# Patient Record
Sex: FEMALE | Race: WHITE | Employment: OTHER | URBAN - NONMETROPOLITAN AREA
[De-identification: names, ages, dates, MRNs, and addresses within clinical notes are randomized per-mention and may not be internally consistent; named-entity substitution may affect disease eponyms.]

---

## 2017-07-26 DIAGNOSIS — I10 ESSENTIAL HYPERTENSION: ICD-10-CM

## 2017-07-26 RX ORDER — CARVEDILOL 3.12 MG/1
TABLET ORAL
Qty: 60 TABLET | Refills: 0 | Status: SHIPPED | OUTPATIENT
Start: 2017-07-26 | End: 2018-01-19 | Stop reason: SDUPTHER

## 2018-01-19 DIAGNOSIS — I10 ESSENTIAL HYPERTENSION: ICD-10-CM

## 2018-01-19 RX ORDER — CARVEDILOL 3.12 MG/1
TABLET ORAL
Qty: 60 TABLET | Refills: 0 | Status: SHIPPED | OUTPATIENT
Start: 2018-01-19

## 2018-01-24 ENCOUNTER — APPOINTMENT (OUTPATIENT)
Dept: GENERAL RADIOLOGY | Age: 83
End: 2018-01-24
Payer: MEDICARE

## 2018-01-24 ENCOUNTER — HOSPITAL ENCOUNTER (EMERGENCY)
Age: 83
Discharge: HOME | End: 2018-01-24
Payer: MEDICARE

## 2018-01-24 VITALS
HEIGHT: 60 IN | OXYGEN SATURATION: 96 % | DIASTOLIC BLOOD PRESSURE: 76 MMHG | RESPIRATION RATE: 18 BRPM | WEIGHT: 165 LBS | SYSTOLIC BLOOD PRESSURE: 123 MMHG | BODY MASS INDEX: 32.39 KG/M2 | TEMPERATURE: 98.2 F | HEART RATE: 64 BPM

## 2018-01-24 DIAGNOSIS — S22.42XA CLOSED FRACTURE OF MULTIPLE RIBS OF LEFT SIDE, INITIAL ENCOUNTER: Primary | ICD-10-CM

## 2018-01-24 PROCEDURE — 71101 X-RAY EXAM UNILAT RIBS/CHEST: CPT

## 2018-01-24 PROCEDURE — 99283 EMERGENCY DEPT VISIT LOW MDM: CPT

## 2018-01-24 PROCEDURE — 99283 EMERGENCY DEPT VISIT LOW MDM: CPT | Performed by: NURSE PRACTITIONER

## 2018-01-24 ASSESSMENT — PAIN DESCRIPTION - ORIENTATION: ORIENTATION: LEFT

## 2018-01-24 ASSESSMENT — PAIN SCALES - GENERAL: PAINLEVEL_OUTOF10: 8

## 2018-01-24 ASSESSMENT — PAIN DESCRIPTION - LOCATION: LOCATION: RIB CAGE

## 2018-01-24 ASSESSMENT — PAIN DESCRIPTION - PAIN TYPE: TYPE: ACUTE PAIN

## 2018-01-24 ASSESSMENT — ENCOUNTER SYMPTOMS
SHORTNESS OF BREATH: 0
COUGH: 0

## 2018-01-24 NOTE — ED PROVIDER NOTES
Left Ear: External ear normal.   Nose: Nose normal.   Mouth/Throat: Oropharynx is clear and moist.   Eyes: Conjunctivae and EOM are normal. Pupils are equal, round, and reactive to light. Neck: Normal range of motion. Neck supple. Cardiovascular: Normal rate, regular rhythm, normal heart sounds and intact distal pulses. Pulmonary/Chest: Effort normal and breath sounds normal. No respiratory distress. She has no wheezes. She exhibits no tenderness. Abdominal: Soft. Bowel sounds are normal.   Musculoskeletal: Normal range of motion. Arms:  Neurological: She is alert and oriented to person, place, and time. Skin: Skin is warm and dry. Psychiatric: She has a normal mood and affect. Vitals reviewed. DIAGNOSTIC RESULTS     RADIOLOGY:   Non-plain film images such as CT, Ultrasound and MRI are read by the radiologist. Plain radiographic images are visualized and preliminarily interpreted by No att. providers found with the below findings:      Interpretation per the Radiologist below, if available at the time of this note:    XR RIBS LEFT INCLUDE CHEST (MIN 3 VIEWS)   Final Result   Age-indeterminate nondisplaced left sixth and seventh rib   fractures. Correlate clinically. Signed by Dr Irma Rogers on 1/24/2018 2:13 PM          LABS:  Labs Reviewed - No data to display    All other labs were within normal range or not returned as of this dictation.     RE-ASSESSMENT        EMERGENCY DEPARTMENT COURSE and DIFFERENTIAL DIAGNOSIS/MDM:   Vitals:    Vitals:    01/24/18 1315 01/24/18 1317   BP:  123/76   Pulse:  64   Resp:  18   Temp:  98.2 °F (36.8 °C)   TempSrc:  Oral   SpO2:  96%   Weight: 165 lb (74.8 kg)    Height: 5' (1.524 m)        MDM  Number of Diagnoses or Management Options  Closed fracture of multiple ribs of left side, initial encounter:   Diagnosis management comments: Diagnosis management comments:   44-year-old female patient presents to the emergency room with complaints of left rib (Please note that portions of this note were completed with a voice recognition program.  Efforts were made to edit the dictations but occasionally words are mis-transcribed.)    Adonay Corbett, IZA  01/24/18 9935

## 2018-01-24 NOTE — ED NOTES
Pt to ED with reports of fall on 1/19/2018 onto left side.  Pt c/o left side/rib pain under and behind left breast/rib area     Toni Kilgore RN  01/24/18 0743

## 2019-07-01 ENCOUNTER — HOSPITAL ENCOUNTER (OUTPATIENT)
Dept: GENERAL RADIOLOGY | Age: 84
Discharge: HOME | End: 2019-07-01
Payer: MEDICARE

## 2019-07-01 DIAGNOSIS — M54.5 ACUTE LOW BACK PAIN, UNSPECIFIED BACK PAIN LATERALITY, WITH SCIATICA PRESENCE UNSPECIFIED: ICD-10-CM

## 2019-07-01 PROCEDURE — 72170 X-RAY EXAM OF PELVIS: CPT

## 2019-07-01 PROCEDURE — 72100 X-RAY EXAM L-S SPINE 2/3 VWS: CPT

## 2022-02-08 ENCOUNTER — APPOINTMENT (OUTPATIENT)
Dept: GENERAL RADIOLOGY | Age: 87
End: 2022-02-08
Payer: MEDICARE

## 2022-02-08 ENCOUNTER — APPOINTMENT (OUTPATIENT)
Dept: CT IMAGING | Age: 87
End: 2022-02-08
Payer: MEDICARE

## 2022-02-08 ENCOUNTER — HOSPITAL ENCOUNTER (EMERGENCY)
Age: 87
Discharge: HOME | End: 2022-02-08
Attending: EMERGENCY MEDICINE
Payer: MEDICARE

## 2022-02-08 VITALS
SYSTOLIC BLOOD PRESSURE: 118 MMHG | DIASTOLIC BLOOD PRESSURE: 81 MMHG | OXYGEN SATURATION: 98 % | TEMPERATURE: 98.4 F | RESPIRATION RATE: 14 BRPM | HEART RATE: 78 BPM

## 2022-02-08 DIAGNOSIS — R11.2 NAUSEA AND VOMITING, INTRACTABILITY OF VOMITING NOT SPECIFIED, UNSPECIFIED VOMITING TYPE: Primary | ICD-10-CM

## 2022-02-08 DIAGNOSIS — R93.89 ABNORMAL CT SCAN: ICD-10-CM

## 2022-02-08 DIAGNOSIS — U07.1 COVID-19: ICD-10-CM

## 2022-02-08 LAB
ALBUMIN SERPL-MCNC: 3.3 G/DL (ref 3.5–5.2)
ALP BLD-CCNC: 53 U/L (ref 35–104)
ALT SERPL-CCNC: 12 U/L (ref 5–33)
ANION GAP SERPL CALCULATED.3IONS-SCNC: 12 MMOL/L (ref 7–19)
AST SERPL-CCNC: 20 U/L (ref 5–32)
BACTERIA: NEGATIVE /HPF
BILIRUB SERPL-MCNC: 0.6 MG/DL (ref 0.2–1.2)
BILIRUBIN URINE: NEGATIVE
BLOOD, URINE: ABNORMAL
BUN BLDV-MCNC: 9 MG/DL (ref 8–23)
CALCIUM SERPL-MCNC: 8.5 MG/DL (ref 8.2–9.6)
CHLORIDE BLD-SCNC: 102 MMOL/L (ref 98–111)
CLARITY: CLEAR
CO2: 26 MMOL/L (ref 22–29)
COLOR: YELLOW
CREAT SERPL-MCNC: 0.6 MG/DL (ref 0.5–0.9)
CRYSTALS, UA: ABNORMAL /HPF
EPITHELIAL CELLS, UA: 2 /HPF (ref 0–5)
GFR AFRICAN AMERICAN: >59
GFR NON-AFRICAN AMERICAN: >60
GLUCOSE BLD-MCNC: 93 MG/DL (ref 74–109)
GLUCOSE URINE: NEGATIVE MG/DL
HCT VFR BLD CALC: 42.8 % (ref 37–47)
HEMOGLOBIN: 14.1 G/DL (ref 12–16)
HYALINE CASTS: 0 /HPF (ref 0–8)
KETONES, URINE: 40 MG/DL
LACTIC ACID: 1.5 MMOL/L (ref 0.5–1.9)
LEUKOCYTE ESTERASE, URINE: NEGATIVE
LIPASE: 35 U/L (ref 13–60)
MCH RBC QN AUTO: 32 PG (ref 27–31)
MCHC RBC AUTO-ENTMCNC: 32.9 G/DL (ref 33–37)
MCV RBC AUTO: 97.3 FL (ref 81–99)
NITRITE, URINE: NEGATIVE
PDW BLD-RTO: 13.6 % (ref 11.5–14.5)
PH UA: 7 (ref 5–8)
PLATELET # BLD: 123 K/UL (ref 130–400)
PMV BLD AUTO: 11.1 FL (ref 9.4–12.3)
POTASSIUM REFLEX MAGNESIUM: 3.7 MMOL/L (ref 3.5–5)
PROTEIN UA: NEGATIVE MG/DL
RBC # BLD: 4.4 M/UL (ref 4.2–5.4)
RBC UA: 4 /HPF (ref 0–4)
SARS-COV-2, NAAT: DETECTED
SODIUM BLD-SCNC: 140 MMOL/L (ref 136–145)
SPECIFIC GRAVITY UA: 1.03 (ref 1–1.03)
TOTAL PROTEIN: 5.5 G/DL (ref 6.6–8.7)
TROPONIN: <0.01 NG/ML (ref 0–0.03)
UROBILINOGEN, URINE: 0.2 E.U./DL
WBC # BLD: 4.7 K/UL (ref 4.8–10.8)
WBC UA: 1 /HPF (ref 0–5)

## 2022-02-08 PROCEDURE — 6370000000 HC RX 637 (ALT 250 FOR IP): Performed by: EMERGENCY MEDICINE

## 2022-02-08 PROCEDURE — 81001 URINALYSIS AUTO W/SCOPE: CPT

## 2022-02-08 PROCEDURE — 96375 TX/PRO/DX INJ NEW DRUG ADDON: CPT

## 2022-02-08 PROCEDURE — 83605 ASSAY OF LACTIC ACID: CPT

## 2022-02-08 PROCEDURE — 2580000003 HC RX 258: Performed by: EMERGENCY MEDICINE

## 2022-02-08 PROCEDURE — 6360000004 HC RX CONTRAST MEDICATION: Performed by: EMERGENCY MEDICINE

## 2022-02-08 PROCEDURE — 85027 COMPLETE CBC AUTOMATED: CPT

## 2022-02-08 PROCEDURE — 6360000002 HC RX W HCPCS: Performed by: EMERGENCY MEDICINE

## 2022-02-08 PROCEDURE — 99284 EMERGENCY DEPT VISIT MOD MDM: CPT

## 2022-02-08 PROCEDURE — 2500000003 HC RX 250 WO HCPCS: Performed by: EMERGENCY MEDICINE

## 2022-02-08 PROCEDURE — 96374 THER/PROPH/DIAG INJ IV PUSH: CPT

## 2022-02-08 PROCEDURE — 80053 COMPREHEN METABOLIC PANEL: CPT

## 2022-02-08 PROCEDURE — 83690 ASSAY OF LIPASE: CPT

## 2022-02-08 PROCEDURE — 71045 X-RAY EXAM CHEST 1 VIEW: CPT

## 2022-02-08 PROCEDURE — 74177 CT ABD & PELVIS W/CONTRAST: CPT

## 2022-02-08 PROCEDURE — 36415 COLL VENOUS BLD VENIPUNCTURE: CPT

## 2022-02-08 PROCEDURE — 84484 ASSAY OF TROPONIN QUANT: CPT

## 2022-02-08 PROCEDURE — 93005 ELECTROCARDIOGRAM TRACING: CPT

## 2022-02-08 PROCEDURE — 87635 SARS-COV-2 COVID-19 AMP PRB: CPT

## 2022-02-08 RX ORDER — OMEPRAZOLE 20 MG/1
20 CAPSULE, DELAYED RELEASE ORAL DAILY
Qty: 14 CAPSULE | Refills: 0 | Status: SHIPPED | OUTPATIENT
Start: 2022-02-08 | End: 2022-10-19

## 2022-02-08 RX ORDER — ONDANSETRON 4 MG/1
4 TABLET, ORALLY DISINTEGRATING ORAL EVERY 8 HOURS PRN
Qty: 15 TABLET | Refills: 0 | Status: SHIPPED | OUTPATIENT
Start: 2022-02-08 | End: 2022-10-19

## 2022-02-08 RX ORDER — SODIUM CHLORIDE 9 MG/ML
INJECTION, SOLUTION INTRAVENOUS CONTINUOUS
Status: DISCONTINUED | OUTPATIENT
Start: 2022-02-08 | End: 2022-02-08 | Stop reason: HOSPADM

## 2022-02-08 RX ORDER — ONDANSETRON 2 MG/ML
4 INJECTION INTRAMUSCULAR; INTRAVENOUS ONCE
Status: COMPLETED | OUTPATIENT
Start: 2022-02-08 | End: 2022-02-08

## 2022-02-08 RX ADMIN — ONDANSETRON 4 MG: 2 INJECTION INTRAMUSCULAR; INTRAVENOUS at 15:27

## 2022-02-08 RX ADMIN — FAMOTIDINE 20 MG: 10 INJECTION, SOLUTION INTRAVENOUS at 17:26

## 2022-02-08 RX ADMIN — Medication: at 17:27

## 2022-02-08 RX ADMIN — IOPAMIDOL 90 ML: 755 INJECTION, SOLUTION INTRAVENOUS at 16:03

## 2022-02-08 RX ADMIN — SODIUM CHLORIDE: 9 INJECTION, SOLUTION INTRAVENOUS at 15:21

## 2022-02-08 ASSESSMENT — ENCOUNTER SYMPTOMS
EYE PAIN: 0
VOMITING: 1
SHORTNESS OF BREATH: 0
NAUSEA: 1
DIARRHEA: 0
BLOOD IN STOOL: 0
ABDOMINAL PAIN: 0

## 2022-02-08 NOTE — ED PROVIDER NOTES
[facility]  eMERGENCY dEPARTMENT eNCOUnter      Pt Name: Agusto Lane  MRN: [id number]  [place] 5/29/1930  Date of evaluation: 2/8/2022  Provider: Nerissa Haas MD    [address] [place] [address]       Chief Complaint   Patient presents with    Emesis     x6 days         HISTORY OF PRESENT ILLNESS   (Location/Symptom, Timing/Onset,Context/Setting, Quality, Duration, Modifying Factors, Severity)  Note limiting factors. Agusto Lane is a 80 y.o. female who presents to the emergency department with multiple complaints. Patient very pleasant but slightly vague historian. Said that she has not felt well for about a week. Said that she has had nausea and decreased appetite. Has not really eaten or drank much at all in the past week. Vomited today. No hematemesis. No black or bloody stools. Tells me her PCP called in antibiotics for her yesterday thinking she might have UTI but she is not actually seen her primary doctor. Vomited after taking the antibiotics. Cannot tell me what antibiotic she is on. Said that her local pharmacist also gave her something over-the-counter for nausea but does not know what this was either. No diarrhea. Mild lower abdominal discomfort. No flank pain. No hematuria. No hematemesis or blood in stools. No cough or respiratory symptoms. No fevers. Has felt fatigued. HPI    NursingNotes were reviewed. REVIEW OF SYSTEMS    (2-9 systems for level 4, 10 or more for level 5)     Review of Systems   Constitutional: Positive for appetite change and fatigue. Negative for fever. Eyes: Negative for pain. Respiratory: Negative for shortness of breath. Cardiovascular: Negative for chest pain and palpitations. Gastrointestinal: Positive for nausea and vomiting. Negative for abdominal pain, blood in stool and diarrhea. Genitourinary: Negative for dysuria. Skin: Negative for rash. Neurological: Negative for weakness and headaches.    All other systems reviewed and are negative. A complete review of systems was performed and is negative except as noted above in the HPI. PAST MEDICAL HISTORY     Past Medical History:   Diagnosis Date    CAD (coronary artery disease) 6/12/2013    Cardiomyopathy ([address] 6/12/2013    EF 25%    CHF (congestive heart failure) (HCC)     Hyperlipemia     Dr. Jaclyn Hurst manages    Status post coronary artery stent placement 6/12/2013    LAD         SURGICAL HISTORY       Past Surgical History:   Procedure Laterality Date    APPENDECTOMY      BACK SURGERY      [facility]    EF 20-25%    TONSILLECTOMY           CURRENT MEDICATIONS       Discharge Medication List as of 2/8/2022  5:56 PM      CONTINUE these medications which have NOT CHANGED    Details   carvedilol (COREG) 3.125 MG tablet TAKE 1 TABLET BY MOUTH 2 TIMES DAILY (WITH MEALS), Disp-60 tablet, R-0Normal      vitamin D (ERGOCALCIFEROL) 58038 UNITS CAPS capsule Take 50,000 Units by mouth once a week      lisinopril (PRINIVIL;ZESTRIL) 5 MG tablet Take 1 tablet by mouth daily, Disp-30 tablet, R-3      pravastatin (PRAVACHOL) 40 MG tablet Take 40 mg by mouth daily. HYDROcodone-acetaminophen (NORCO) 5-325 MG per tablet Take 1 tablet by mouth every 6 hours as needed for Pain. furosemide (LASIX) 40 MG tablet Take 40 mg by mouth daily. aspirin 81 MG tablet Take 81 mg by mouth daily. ALLERGIES     Codeine, Cortizone, Penicillins, and Sulfa antibiotics    FAMILY HISTORY       Family History   Problem Relation Age of Onset    Cancer Sister           SOCIAL HISTORY       Social History     Socioeconomic History    Marital status:       Spouse name: None    Number of children: None    Years of education: None    Highest education level: None   Occupational History    None   Tobacco Use    Smoking status: Never Smoker    Smokeless tobacco: Never Used   Substance and Sexual Activity    Alcohol use: No    Drug use: No    Sexual activity: None   Other Topics Concern    None   Social History Narrative    None     Social Determinants of Health     Financial Resource Strain:     Difficulty of Paying Living Expenses: Not on file   Food Insecurity:     Worried About Running Out of Food in the Last Year: Not on file    Marzena of Food in the Last Year: Not on file   Transportation Needs:     Lack of Transportation (Medical): Not on file    Lack of Transportation (Non-Medical): Not on file   Physical Activity:     Days of Exercise per Week: Not on file    Minutes of Exercise per Session: Not on file   Stress:     Feeling of Stress : Not on file   Social Connections:     Frequency of Communication with Friends and Family: Not on file    Frequency of Social Gatherings with Friends and Family: Not on file    Attends Hoahaoism Services: Not on file    Active Member of [address] [place] [address] or Organizations: Not on file    Attends Club or Organization Meetings: Not on file    Marital Status: Not on file   Intimate Partner Violence:     Fear of Current or Ex-Partner: Not on file    Emotionally Abused: Not on file    Physically Abused: Not on file    Sexually Abused: Not on file   Housing Stability:     Unable to Pay for Housing in the Last Year: Not on file    Number of [place] in the Last Year: Not on file    Unstable Housing in the Last Year: Not on file       SCREENINGS             PHYSICAL EXAM    (up to 7 for level 4, 8 or more for level 5)     ED Triage Vitals [02/08/22 1406]   BP Temp Temp Source Pulse Resp SpO2 Height Weight   123/89 98.7 °F (37.1 °C) Oral 72 17 99 % -- --       Physical Exam  Vitals reviewed. Constitutional:       General: She is not in acute distress. Appearance: She is well-developed. She is not toxic-appearing. HENT:      Head: Normocephalic and atraumatic. Mouth/Throat:      Mouth: Mucous membranes are moist.      Pharynx: Oropharynx is clear.  No oropharyngeal exudate or posterior oropharyngeal erythema. Eyes:      General: No scleral icterus. Pupils: Pupils are equal, round, and reactive to light. Neck:      Vascular: No JVD. Cardiovascular:      Rate and Rhythm: Normal rate and regular rhythm. Pulses: Normal pulses. Heart sounds: Normal heart sounds. Pulmonary:      Effort: Pulmonary effort is normal. No respiratory distress. Breath sounds: Normal breath sounds. Abdominal:      General: There is no distension. Palpations: Abdomen is soft. There is no pulsatile mass. Tenderness: There is no abdominal tenderness. There is no guarding or rebound. Musculoskeletal:         General: No tenderness. Cervical back: Normal range of motion and neck supple. Skin:     General: Skin is warm and dry. Capillary Refill: Capillary refill takes less than 2 seconds. Neurological:      General: No focal deficit present. Mental Status: She is alert and oriented to person, place, and time. Psychiatric:         Mood and Affect: Mood normal.         Behavior: Behavior normal.         DIAGNOSTIC RESULTS     EKG: All EKG's are interpreted by the Emergency Department Physician who either signs or Co-signs this chart in the absence of a cardiologist.    Normal sinus rhythm. Normal QT. Borderline ST changes in several leads. T wave changes laterally and in V2. Probable LVH    RADIOLOGY:   Non-plain film images such as CT, Ultrasound and MRI are read by the radiologist. Michael Gums images are visualized and preliminarily interpreted by the emergency physician with the below findings:    Interpretation per the Radiologist below, if available at the time of this note:    CT ABDOMEN PELVIS W IV CONTRAST Additional Contrast? None   Final Result   1. Wall thickening suspected of the distal stomach/gastric antrum   which may be infectious/inflammatory or possibly neoplastic. No   evidence for gastric outlet obstruction with decompressed stomach.  No   free air or loculated abscess. No bowel obstruction. Left colonic   diverticulosis. Absent appendix. 2. Cholelithiasis without biliary dilatation. 3. Cortical renal atrophy with peripelvic renal cysts. No   hydronephrosis. 4. Osteopenia with grade 2 spondylolisthesis at L4/5 and chronic   appearing compression of the L3 and L1 vertebra. 5. Stable hepatic cyst.   Signed by Dr Star Carvajal      XR CHEST PORTABLE   Final Result   1. Medial right basilar densities may be secondary to patchy   atelectasis or pneumonia. Stable cardiomegaly with no radiographic   evidence of edema. .   Signed by Dr Casillas Balloon:  Labs Reviewed   COVID-19, RAPID - Abnormal; Notable for the following components:       Result Value    SARS-CoV-2, NAAT DETECTED (*)     All other components within normal limits    Narrative:     [place] tel. ,  Microbiology results called to and read back by Anna smith, 02/08/2022  15:39, by ROBERT   CBC - Abnormal; Notable for the following components:    WBC 4.7 (*)     MCH 32.0 (*)     MCHC 32.9 (*)     Platelets 538 (*)     All other components within normal limits   COMPREHENSIVE METABOLIC PANEL W/ REFLEX TO MG FOR LOW K - Abnormal; Notable for the following components: Total Protein 5.5 (*)     Albumin 3.3 (*)     All other components within normal limits   URINE RT REFLEX TO CULTURE - Abnormal; Notable for the following components:    Ketones, Urine 40 (*)     Blood, Urine TRACE (*)     All other components within normal limits   MICROSCOPIC URINALYSIS - Abnormal; Notable for the following components:    Bacteria, UA NEGATIVE (*)     Crystals, UA NEG (*)     All other components within normal limits   LIPASE   LACTIC ACID, PLASMA   TROPONIN       All other labs were within normal range or not returned as of this dictation.     EMERGENCY DEPARTMENT COURSE and DIFFERENTIALDIAGNOSIS/MDM:   Vitals:    Vitals:    02/08/22 1406 02/08/22 1700   BP: 123/89 118/81   Pulse: 72 78 Resp: 17 14   Temp: 98.7 °F (37.1 °C) 98.4 °F (36.9 °C)   TempSrc: Oral    SpO2: 99% 98%       MDM  Discussed risk and benefits of Sotrovimab infusion. After discussing this patient declines Sotrovimab. Discussed all results with patient and her niece who is here with her now. No strong indications for admission at this time but given her age and very poor p.o. intake and the fact that she is vomited today offered to speak with the hospitalist about admission. Patient does not want to stay. Prefers to go home. Will see if she tolerates p.o. challenge and reassess. Patient still nauseated but no vomiting here and tolerating oral intake. Offered to admit but she declines admission and wants to go home. Told her to follow-up with her primary doctor and return to the ER for any change or worsening symptoms or new concerns. Will make sure patient can ambulate with a walker prior to discharge which is what she does at baseline. Family is here with her. I told them that they should either stay with her or make sure someone is checking on patient very frequently. Very clear that patient needs to return if she has any change or worsening symptoms or new concerns. Otherwise, told patient she needs to follow-up with her primary doctor and to notify primary doctor about visit here so they can review all of her results from today's visit. Offered to go ahead and put her on antibiotics for possible secondary pneumonia given possible infiltrate seen on chest x-ray although told patient and family that this is probably from [place]. Patient declines antibiotics. Again, told to return to ER for change worsening symptoms or new concerns. I have discussed my findings, my impression, and my differential diagnosis to the patient. The patient understands the risks, benefits, and alternatives of an inpatient versus outpatient continued evaluation and treatment. The patient has capacity to make medical decisions. The patient declines hospital admission or further observation in the emergency department. The patient understands the importance of follow-up and agrees to return if the condition changes, worsens, or if the patient changes his/her mind about inpatient evaluation and care. CONSULTS:  None    PROCEDURES:  Unless otherwise notedbelow, none     Procedures    FINAL IMPRESSION     1. Nausea and vomiting, intractability of vomiting not specified, unspecified vomiting type    2. COVID-19    3.  Abnormal CT scan          DISPOSITION/PLAN   DISPOSITION        PATIENT REFERRED TO:  @FUP@    DISCHARGE MEDICATIONS:  Discharge Medication List as of 2/8/2022  5:56 PM      START taking these medications    Details   ondansetron (ZOFRAN ODT) 4 MG disintegrating tablet Take 1 tablet by mouth every 8 hours as needed for Nausea or Vomiting, Disp-15 tablet, R-0Normal      omeprazole (PRILOSEC) 20 MG delayed release capsule Take 1 capsule by mouth daily, Disp-14 capsule, R-0Normal                (Please note that portions of this note were completed with a voice recognition program.  Efforts were made to edit the dictations butoccasionally words are mis-transcribed.)    Donny Burton MD (electronically signed)  AttendingEmergency Physician          Donny Burton MD  02/17/22 2808

## 2022-02-09 ENCOUNTER — CARE COORDINATION (OUTPATIENT)
Dept: CARE COORDINATION | Age: 87
End: 2022-02-09

## 2022-02-09 NOTE — CARE COORDINATION
Ambulatory Care Coordination ED COVID Follow up Call    Challenges to be reviewed by the provider   Additional needs identified to be addressed with provider: Yes  abnormal CT result from ER evaluation                 Encounter was routed to provider for escalation. Method of communication with provider: phone    Discussed [phone] related testing which was: available at this time. Test results were: positive. Patient informed of results, if available? Yes. Current Symptoms: fatigue, no new symptoms and no worsening symptoms   Pt stated she is very tired after yesterday. She reported she feels less nauseated. She took her morning meds. Pt has neighbor that will do errands for pt and will check on her. Her niece helps as well but lives in [place] - does call pt regularly to check in. Pt stated she is going to eat cereal and juice this morning. Her pharmacy will deliver her prescriptions today. She denied any respiratory symptoms or concerns today. Patient understands ACM will contact her PCP office and she will be contacted regarding f/u of her ER visit. She had no additional questions. Reviewed New or Changed Meds: yes    Do you have what you need at home?  Durable Medical Equipment ordered at discharge: None   Home Health/Outpatient orders at discharge: none   Was patient discharged with a pulse oximeter? No Discussed and confirmed pulse oximeter discharge instructions and when to notify provider or seek emergency care. Patient education provided: Reviewed appropriate site of care based on symptoms and resources available to patient including: PCP and When to call 911. Follow up appointment recommended: yes. If no appointment scheduled, scheduling offered: Yes and ACM called PCP. They will contact pt. ACM faxed ER summary and imaging reports to office. No future appointments.     Interventions: Obtained and reviewed discharge summary and/or continuity of care documents  Education of

## 2022-02-14 ENCOUNTER — CARE COORDINATION (OUTPATIENT)
Dept: CARE COORDINATION | Age: 87
End: 2022-02-14

## 2022-02-14 LAB
EKG P AXIS: 55 DEGREES
EKG P-R INTERVAL: 198 MS
EKG Q-T INTERVAL: 456 MS
EKG QRS DURATION: 112 MS
EKG QTC CALCULATION (BAZETT): 471 MS
EKG T AXIS: 126 DEGREES
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
## 2022-02-16 ENCOUNTER — CARE COORDINATION (OUTPATIENT)
Dept: CARE COORDINATION | Age: 87
End: 2022-02-16

## 2022-02-16 NOTE — CARE COORDINATION
Follow Up Call    Challenges to be reviewed by the provider   Additional needs identified to be addressed with provider: No  none                 Encounter was not routed to provider for escalation. Method of communication with provider:  none. Contacted the patient by telephone to follow up after ED. Status: improved  Interventions to address identified needs: Assessment and support for treatment adherence and medication management-self-care and mgmt of symptoms, medication compliance, completion of appt with provider. [facility] follow up appointment(s): No future appointments. Non-[facility] follow up appointment(s): Dr. Evita Gutierrez on 2/17/22   Follow up appointment completed? tomorrow. Provided contact information for future needs. Plan for follow-up call in 1-2 days based on severity of symptoms and risk factors. Plan for next call: follow up self care and improved health.     Tucker Mays RN

## 2022-02-18 ENCOUNTER — CARE COORDINATION (OUTPATIENT)
Dept: CARE COORDINATION | Age: 87
End: 2022-02-18
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
## 2022-09-17 ENCOUNTER — APPOINTMENT (OUTPATIENT)
Dept: GENERAL RADIOLOGY | Age: 87
End: 2022-09-17
Payer: MEDICARE

## 2022-09-17 ENCOUNTER — HOSPITAL ENCOUNTER (EMERGENCY)
Age: 87
Discharge: HOME | End: 2022-09-17
Payer: MEDICARE

## 2022-09-17 VITALS
TEMPERATURE: 99 F | SYSTOLIC BLOOD PRESSURE: 122 MMHG | DIASTOLIC BLOOD PRESSURE: 72 MMHG | RESPIRATION RATE: 15 BRPM | OXYGEN SATURATION: 94 % | HEART RATE: 73 BPM

## 2022-09-17 DIAGNOSIS — M25.552 LEFT HIP PAIN: Primary | ICD-10-CM

## 2022-09-17 PROCEDURE — 73502 X-RAY EXAM HIP UNI 2-3 VIEWS: CPT

## 2022-09-17 PROCEDURE — 73502 X-RAY EXAM HIP UNI 2-3 VIEWS: CPT | Performed by: RADIOLOGY

## 2022-09-17 PROCEDURE — 99283 EMERGENCY DEPT VISIT LOW MDM: CPT

## 2022-09-17 ASSESSMENT — ENCOUNTER SYMPTOMS
COUGH: 0
VOMITING: 0
NAUSEA: 0
SHORTNESS OF BREATH: 0
DIARRHEA: 0
ABDOMINAL PAIN: 0

## 2022-09-17 ASSESSMENT — PAIN DESCRIPTION - DESCRIPTORS: DESCRIPTORS: ACHING

## 2022-09-17 ASSESSMENT — PAIN DESCRIPTION - LOCATION: LOCATION: LEG

## 2022-09-17 ASSESSMENT — PAIN - FUNCTIONAL ASSESSMENT: PAIN_FUNCTIONAL_ASSESSMENT: 0-10

## 2022-09-17 ASSESSMENT — PAIN SCALES - GENERAL: PAINLEVEL_OUTOF10: 2

## 2022-09-17 ASSESSMENT — PAIN DESCRIPTION - ORIENTATION: ORIENTATION: LEFT

## 2022-09-17 ASSESSMENT — PAIN DESCRIPTION - FREQUENCY: FREQUENCY: CONTINUOUS

## 2022-09-17 ASSESSMENT — PAIN DESCRIPTION - PAIN TYPE: TYPE: ACUTE PAIN

## 2022-09-17 NOTE — ED PROVIDER NOTES
[facility] EMERGENCY DEPT  eMERGENCY dEPARTMENT eNCOUnter      Pt Name: Jolynn Joe  MRN: [id number]  [place] 5/29/1930  Date of evaluation: 9/17/2022  Provider: Ana María Kern, [address] [place] [address]       Chief Complaint   Patient presents with    Fall     Pt arrives via EMS from home. Pt fell last Sunday when she tripped over her bedspread. Pt denies LOC. Pt reports increased left hip pain. No shortening or rotation noted, tender upon palpation. HISTORY OF PRESENT ILLNESS   (Location/Symptom, Timing/Onset,Context/Setting, Quality, Duration, Modifying Factors, Severity)  Note limiting factors. Jolynn Joe is a 80 y.o. female with history including hyperlipidemia, CHF, CAD, AAA, and dilated cardiomyopathy who presents to the emergency department with complaint of a fall. The patient fell last Sunday, 7 days ago. She states she was getting up to go unlock her front door whenever she tripped over her comforter. She denies any associated head injury or loss of consciousness at that time. She states she landed on her left hip. She was able to get up and use her walker. She has pain with ambulation. She does still have normal range of motion of the hip. She denies any associated back pain, numbness, bowel or bladder dysfunction, or saddle anesthesia. HPI    NursingNotes were reviewed. REVIEW OF SYSTEMS    (2-9 systems for level 4, 10 or more for level 5)     Review of Systems   Constitutional:  Negative for chills and fever. HENT:  Negative for congestion. Respiratory:  Negative for cough and shortness of breath. Cardiovascular:  Negative for chest pain. Gastrointestinal:  Negative for abdominal pain, diarrhea, nausea and vomiting. Genitourinary:  Negative for dysuria, flank pain, frequency and hematuria. Musculoskeletal:  Positive for arthralgias. Neurological:  Negative for dizziness and headaches. All other systems reviewed and are negative.          PAST MEDICALHISTORY     Past Medical History:   Diagnosis Date    CAD (coronary artery disease) 6/12/2013    Cardiomyopathy ([facility] [address] 6/12/2013    EF 25%    CHF (congestive heart failure) ([facility] [address]     Hyperlipemia     Dr. Laquita Pryor manages    Status post coronary artery stent placement 6/12/2013    LAD         SURGICAL HISTORY       Past Surgical History:   Procedure Laterality Date    APPENDECTOMY      BACK SURGERY      CARDIAC CATHETERIZATION  4/8/2013   [facility]    EF 20-25%    TONSILLECTOMY           CURRENT MEDICATIONS     Previous Medications    ASPIRIN 81 MG TABLET    Take 81 mg by mouth daily. CARVEDILOL (COREG) 3.125 MG TABLET    TAKE 1 TABLET BY MOUTH 2 TIMES DAILY (WITH MEALS)    FUROSEMIDE (LASIX) 40 MG TABLET    Take 40 mg by mouth daily. HYDROCODONE-ACETAMINOPHEN (NORCO) 5-325 MG PER TABLET    Take 1 tablet by mouth every 6 hours as needed for Pain. LISINOPRIL (PRINIVIL;ZESTRIL) 5 MG TABLET    Take 1 tablet by mouth daily    OMEPRAZOLE (PRILOSEC) 20 MG DELAYED RELEASE CAPSULE    Take 1 capsule by mouth daily    ONDANSETRON (ZOFRAN ODT) 4 MG DISINTEGRATING TABLET    Take 1 tablet by mouth every 8 hours as needed for Nausea or Vomiting    PRAVASTATIN (PRAVACHOL) 40 MG TABLET    Take 40 mg by mouth daily. VITAMIN D (ERGOCALCIFEROL) 07293 UNITS CAPS CAPSULE    Take 50,000 Units by mouth once a week       ALLERGIES     Codeine, Cortizone, Penicillins, and Sulfa antibiotics    FAMILY HISTORY       Family History   Problem Relation Age of Onset    Cancer Sister           SOCIAL HISTORY       Social History     Socioeconomic History    Marital status:     Tobacco Use    Smoking status: Never    Smokeless tobacco: Never   Substance and Sexual Activity    Alcohol use: No    Drug use: No       SCREENINGS    [place] Coma Scale  Eye Opening: Spontaneous  Best Verbal Response: Oriented  Best Motor Response: Obeys commands  [place] Coma Scale Score: 15        PHYSICAL EXAM    (up to 7 for level 4, 8 or more for level 5)     ED Triage Vitals [09/17/22 1029]   BP Temp Temp src Heart Rate Resp SpO2 Height Weight   126/68 99 °F (37.2 °C) -- 75 16 92 % -- --       Physical Exam  Vitals and nursing note reviewed. Constitutional:       General: She is not in acute distress. Appearance: Normal appearance. She is normal weight. She is not ill-appearing, toxic-appearing or diaphoretic. HENT:      Head: Normocephalic and atraumatic. Eyes:      Extraocular Movements: Extraocular movements intact. Pupils: Pupils are equal, round, and reactive to light. Cardiovascular:      Rate and Rhythm: Normal rate and regular rhythm. Pulses: Normal pulses. Pulmonary:      Effort: Pulmonary effort is normal. No respiratory distress. Chest:      Chest wall: No tenderness. Abdominal:      General: There is no distension. Palpations: Abdomen is soft. Tenderness: There is no abdominal tenderness. Musculoskeletal:      Cervical back: Normal range of motion and neck supple. No swelling, rigidity, tenderness or bony tenderness. No pain with movement. Normal range of motion. Thoracic back: No swelling, deformity, tenderness or bony tenderness. Normal range of motion. Lumbar back: No swelling, deformity, tenderness or bony tenderness. Normal range of motion. Left hip: Tenderness and bony tenderness present. No deformity. Normal range of motion. Normal strength. Left upper leg: No swelling, edema, deformity, tenderness or bony tenderness. Left knee: No swelling, deformity or bony tenderness. Normal range of motion. No tenderness. Normal alignment. Normal pulse. Left lower leg: No swelling, deformity, tenderness or bony tenderness. Comments: Bilateral upper and lower extremities are negative for tenderness, swelling, decreased range of motion, or deformity except for the left hip. Neurovascularly intact extremities. Tenderness noted of the right hip without associated decrease in range of motion.   Joint above and below within normal limits. Skin:     General: Skin is warm and dry. Neurological:      General: No focal deficit present. Mental Status: She is alert and oriented to person, place, and time. DIAGNOSTIC RESULTS     RADIOLOGY:  Non-plain film images such as CT, Ultrasound and MRI are read by the radiologist. Plain radiographic images are visualized and preliminarily interpreted bythe emergency physician with the below findings:    XR HIP 2-3 VW W PELVIS LEFT   Final Result   Severe demineralization with diffuse vascular calcifications. Study otherwise normal   Recommendation: Follow up as clinically indicated. Electronically Signed by Norman Kang MD at 17-Sep-2022 12:57:19 PM                 LABS:  Labs Reviewed - No data to display    All other labs were within normal range or not returned as of this dictation. EMERGENCY DEPARTMENT COURSE and DIFFERENTIAL DIAGNOSIS/MDM:   Vitals:    Vitals:    09/17/22 1029   BP: 126/68   Pulse: 75   Resp: 16   Temp: 99 °F (37.2 °C)   SpO2: 92%       MDM  Patient is a 80-year-old female presents ER with complaint of left hip pain after a fall 1 week ago. Her vital signs are stable. She does not have any associated tenderness or other complaints of pain warranting further imaging. Plain films of the left hip and pelvis were negative for acute bony fracture or malalignment. She is able to ambulate but does require walker due to her pain. I spoke with her caretaker, Bing otoole. She notes that the patient has a wheelchair and a walker at home. Ms. otoole is actually the one who called EMS today. She states she called due to Ms. Monique Rasmussen being confused. She states that the patient was not acting at her baseline. She denies any associated significant unilateral weakness or slurred speech. I discussed the complaint of concern for confusion with the patient and her niece.   Patient denies any associated confusion this morning and states she was at her baseline. She states she was mixed up on who was supposed to be coming to the house this morning but otherwise had no confusion. Her niece notes she is at her baseline currently. She is alert and oriented in the ER. Due to her being alert and oriented, she is in the correct state of mind to decline any further testing. Patient has declined any further work-up regarding her confusion and only wanted to be seen for the left hip. She will be discharged with her niece's care. She and her niece did verbalize understanding of risk associated with declining work-up as well as return precautions. All their questions were answered. They are agreeable to treatment plan. I did encourage follow-up outpatient with orthopedics if she is not improving. She is already on loratab at home so she does not warrant further pain meds at home. FINAL IMPRESSION      1. Left hip pain          DISPOSITION/PLAN   DISPOSITION Decision To Discharge 09/17/2022 12:30:18 PM      PATIENT REFERRED TO:  Hayes Odonnell  [address] [place] [address].  [address] [place] [address]    In 3 days      Valente Pool MD  [address] [place] [address]  [phone]      As needed, If symptoms worsen      DISCHARGE MEDICATIONS:  New Prescriptions    No medications on file          (Please note that portions of this note were completed with a voice recognition program.  Efforts were made to edit thedictations but occasionally words are mis-transcribed.)    Lucian Weinberg (electronically signed)        Lucian Weinberg  09/17/22 2015

## 2022-10-19 ENCOUNTER — HOSPITAL ENCOUNTER (OUTPATIENT)
Dept: WOUND CARE | Age: 87
Discharge: HOME | End: 2022-10-19
Payer: MEDICARE

## 2022-10-19 VITALS
WEIGHT: 130 LBS | HEIGHT: 60 IN | TEMPERATURE: 97.2 F | BODY MASS INDEX: 25.52 KG/M2 | HEART RATE: 76 BPM | RESPIRATION RATE: 20 BRPM | DIASTOLIC BLOOD PRESSURE: 54 MMHG | SYSTOLIC BLOOD PRESSURE: 126 MMHG

## 2022-10-19 DIAGNOSIS — L97.422: Primary | ICD-10-CM

## 2022-10-19 DIAGNOSIS — L89.610 UNSTAGEABLE PRESSURE ULCER OF RIGHT HEEL (HCC): ICD-10-CM

## 2022-10-19 DIAGNOSIS — L89.620 UNSTAGEABLE PRESSURE ULCER OF LEFT HEEL (HCC): ICD-10-CM

## 2022-10-19 PROCEDURE — 99214 OFFICE O/P EST MOD 30 MIN: CPT

## 2022-10-19 PROCEDURE — 99215 OFFICE O/P EST HI 40 MIN: CPT | Performed by: NURSE PRACTITIONER

## 2022-10-19 NOTE — DISCHARGE INSTRUCTIONS
[address] [facility]   Physician Orders and Discharge Instructions  [address]  Telephone: [phone]    NAME:  Carlos Alberto Shrestha  YOB: 1930  MEDICAL RECORD NUMBER:  [id number]  DATE:  10/19/2022    Discharge condition: Stable    Discharge to: Home    Left via:Private automobile    Accompanied by:  friend    ECF/HHA: [facility]    Please go downstairs in this building to Room 103 to register. The procedure will be completed in Room 401. Please be aware of appointment time for this procedure. Please arrive to room 103 at 2:00pm on October 24 for procedure. Please do not smoke prior to test.    Dressing Orders:  Keep heels dry    Treatment Orders:  Avoid Pressure to wound site. Off-load heels by applying heel-lift boots or \"float\" heels by placing pillows under calves so that heels do not touch mattress. Can be ordered online- [employer] or [employer]    Continue Protein rich diet (unless restricted by your physician)  Take Multivitamin daily    Please use Roho cushion in chair  Please use low air loss bed      [facility] follow up visit _____1 week with Shanon________________________  (Please note your next appointment above and if you are unable to keep, kindly give a 24 hour notice. Thank you.)          If you experience any of the following, please call the [email or web address] during business hours:    * Increase in Pain  * Temperature over 101  * Increase in drainage from your wound  * Drainage with a foul odor  * Bleeding  * Increase in swelling  * Need for compression bandage changes due to slippage, breakthrough drainage. If you need medical attention outside of the business hours of the [address] please contact your PCP or go to the nearest emergency room.

## 2022-10-20 PROBLEM — L89.620 UNSTAGEABLE PRESSURE ULCER OF LEFT HEEL (HCC): Status: ACTIVE | Noted: 2022-10-20

## 2022-10-20 PROBLEM — L89.610 UNSTAGEABLE PRESSURE ULCER OF RIGHT HEEL (HCC): Status: ACTIVE | Noted: 2022-10-20

## 2022-10-20 ASSESSMENT — VISUAL ACUITY: OU: 1

## 2022-10-20 NOTE — PLAN OF CARE
Problem: Chronic Conditions and Co-morbidities  Goal: Patient's chronic conditions and co-morbidity symptoms are monitored and maintained or improved  Outcome: Progressing     Problem: Discharge Planning  Goal: Discharge to home or other facility with appropriate resources  Outcome: Progressing     Problem: Wound:  Goal: Will show signs of wound healing; wound closure and no evidence of infection  Description: Will show signs of wound healing; wound closure and no evidence of infection  Outcome: Progressing     Problem: Pressure Ulcer:  Goal: Signs of wound healing will improve  Description: Signs of wound healing will improve  Outcome: Progressing  Goal: Absence of new pressure ulcer  Description: Absence of new pressure ulcer  Outcome: Progressing  Goal: Will show no infection signs and symptoms  Description: Will show no infection signs and symptoms  Outcome: Progressing

## 2022-10-20 NOTE — PROGRESS NOTES
Patient Care Team:  Sigifredo Chang as PCP - General  CShorty Fatima MD (Cardiology)    TODAY'S DATE:  10/19/2022     HISTORY of PRESENTILLNESS HPI   Mariano Green is a 80 y.o. female who presents today for wound evaluation. She reports she developed a wound on right/left foot. This started 2 week(s) ago. She believes this is not healing. She has been applying nothing. She has not had  fever or chills. She has a history of bedridden due to a recent fall. She states she is worsening as she is walking. Wound Type:pressure  Wound Location: left heel and right heel   Modifying factors:none    Patient Active Problem List   Diagnosis Code    Ischemic dilated cardiomyopathy ([facility] [address] I25.5, I42.0    Status post coronary artery stent placement Z95.5    Mixed hyperlipidemia E78.2    CAP (community acquired pneumonia) J18.9    Cholelithiasis K80.20    Diverticulosis K57.90    Hyperlipemia E78.5    Chronic systolic congestive heart failure (HCC) I50.22    Acute respiratory failure with hypoxia (HCC) J96.01    AAA (abdominal aortic aneurysm) I71.40    Descending thoracic aortic aneurysm I71.23    Pneumonia of right lower lobe due to infectious organism J18.9    Calculus of gallbladder without cholecystitis without obstruction K80.20    Coronary artery disease involving native heart without angina pectoris I25.10    Diverticulosis of intestine without bleeding K57.90    Hyperlipidemia E78.5    Unstageable pressure ulcer of left heel (HCC) L89.620    Unstageable pressure ulcer of right heel ([facility] [address] L89.610       Mariano Green is a 80 y.o. female with the following history reviewed and recorded in [facility]:    Current Outpatient Medications   Medication Sig Dispense Refill    carvedilol (COREG) 3.125 MG tablet TAKE 1 TABLET BY MOUTH 2 TIMES DAILY (WITH MEALS) 60 tablet 0    lisinopril (PRINIVIL;ZESTRIL) 5 MG tablet Take 1 tablet by mouth daily 30 tablet 3    pravastatin (PRAVACHOL) 40 MG tablet Take 40 mg by mouth daily. HYDROcodone-acetaminophen (NORCO) 5-325 MG per tablet Take 1 tablet by mouth every 6 hours as needed for Pain. furosemide (LASIX) 40 MG tablet Take 40 mg by mouth daily. (Patient not taking: Reported on 10/19/2022)      aspirin 81 MG tablet Take 81 mg by mouth daily. No current facility-administered medications for this encounter. Allergies: Codeine, Cortizone, Penicillins, and Sulfa antibiotics  Past Medical History:   Diagnosis Date    CAD (coronary artery disease) 6/12/2013    Cardiomyopathy ([address] 6/12/2013    EF 25%    CHF (congestive heart failure) (HCC)     Hyperlipemia     Dr. Yohana Dang manages    Status post coronary artery stent placement 6/12/2013    LAD       Past Surgical History:   Procedure Laterality Date    APPENDECTOMY      BACK SURGERY      CARDIAC CATHETERIZATION  4/8/2013   [facility]    EF 20-25%    TONSILLECTOMY       Family History   Problem Relation Age of Onset    Cancer Sister      Social History     Tobacco Use    Smoking status: Never    Smokeless tobacco: Never   Substance Use Topics    Alcohol use: No         Review of Systems    Review of Systems   Skin:  Positive for wound. All other systems reviewed and are negative. All other review of systems are negative. Physical Exam    BP (!) 126/54   Pulse 76   Temp 97.2 °F (36.2 °C) (Temporal)   Resp 20   Ht 5' (1.524 m)   Wt 130 lb (59 kg)   BMI 25.39 kg/m²     Physical Exam  Vitals reviewed. Constitutional:       Appearance: Normal appearance. She is normal weight. HENT:      Head: Normocephalic and atraumatic. Right Ear: External ear normal.      Left Ear: External ear normal.   Eyes:      General: Lids are normal. Lids are everted, no foreign bodies appreciated. Vision grossly intact. Gaze aligned appropriately. Cardiovascular:      Rate and Rhythm: Normal rate and regular rhythm. Pulses:           Dorsalis pedis pulses are detected w/ Doppler on the right side and detected w/ Doppler on the left side. Posterior tibial pulses are detected w/ Doppler on the right side and detected w/ Doppler on the left side. Heart sounds: Normal heart sounds. Pulmonary:      Effort: Pulmonary effort is normal.      Breath sounds: Normal breath sounds. Abdominal:      General: Bowel sounds are normal.   Musculoskeletal:         General: Normal range of motion. Feet:    Skin:     General: Skin is warm and dry. Capillary Refill: Capillary refill takes 2 to 3 seconds. Neurological:      Mental Status: She is alert and oriented to person, place, and time. Psychiatric:         Mood and Affect: Mood normal.         Behavior: Behavior normal.         Thought Content: Thought content normal.         Judgment: Judgment normal.           Post Debridement Measurements and Assessment:    The patientspain is   . Please refer to nursing measurements and assessment regarding wound pre and postdebridement. Wound 10/19/22 Heel Right wound 2- right heel unstageable (Active)   Wound Image   10/19/22 1417   Wound Etiology Pressure Unstageable 10/19/22 1417   Dressing Status Clean;Dry; Intact 10/19/22 1417   Wound Cleansed Not Cleansed 10/19/22 1417   Dressing/Treatment Dry dressing 10/20/22 0749   Wound Length (cm) 2.5 cm 10/19/22 1417   Wound Width (cm) 2.5 cm 10/19/22 1417   Wound Depth (cm) 0.1 cm 10/19/22 1417   Wound Surface Area (cm^2) 6.25 cm^2 10/19/22 1417   Wound Volume (cm^3) 0.625 cm^3 10/19/22 1417   Wound Assessment Eschar dry 10/19/22 1417   Drainage Amount None 10/19/22 1417   Odor None 10/19/22 1417   Nikki-wound Assessment Intact 10/19/22 1417   Margins Flat/open edges 10/19/22 1417   Wound Thickness Description not for Pressure Injury Full thickness 10/19/22 1417   Number of days: 0       Wound 10/19/22 Heel Left wound 1- left heel unstagable (Active)   Wound Image   10/19/22 1417   Wound Etiology Pressure Unstageable 10/19/22 1417   Dressing Status Clean;Dry; Intact 10/19/22 1417   Wound Cleansed Not Cleansed 10/19/22 1417   Dressing/Treatment Dry dressing 10/20/22 0749   Wound Length (cm) 4 cm 10/19/22 1417   Wound Width (cm) 5 cm 10/19/22 1417   Wound Depth (cm) 0.1 cm 10/19/22 1417   Wound Surface Area (cm^2) 20 cm^2 10/19/22 1417   Wound Volume (cm^3) 2 cm^3 10/19/22 1417   Wound Assessment Eschar dry 10/19/22 1417   Drainage Amount None 10/19/22 1417   Odor None 10/19/22 1417   Nikki-wound Assessment Intact 10/19/22 1417   Margins Flat/open edges 10/19/22 1417   Wound Thickness Description not for Pressure Injury Full thickness 10/19/22 1417   Number of days: 0          Assessment    1. Ischemic ulcer of left heel, with fat layer exposed ([address]    2. Unstageable pressure ulcer of left heel ([address]    3. Unstageable pressure ulcer of right heel ([address]          Plan    JENNIFER    Plan for wound - Dress per physician order  Treatment:     Compression : No   Offloading : Yes   Dressing : keep area dry    Discussed importance of keeping the area dry, offloading, nutrition, and plan of care. Patient understanding and questions answered. I spent a total of  60 minutes face to face with the patient. Over 100% of that time was spent on counseling and care coordination. Patient was told that if symptoms worsen or new symptoms develop they are to go to the emergency department immediately. Patient was educated on diagnosis and treatment plan. All of patient's questions were answered, and the patient understands the discharge plan. Discussed appropriate home care of this wound. Wound redressed. Patient instructions were given. Recommend no smoking  Offloading instructions given.       [address] [facility]   Physician Orders and Discharge Instructions  [address]  Telephone: [phone]    NAME:  Stephanie Farfan  YOB: 1930  MEDICAL RECORD NUMBER:  [id number]  DATE: 10/19/2022    Discharge condition: Stable    Discharge to: Home    Left via:Private automobile    Accompanied by:  friend    [facility]HHA: [facility]    Please go downstairs in this building to Room 103 to register. The procedure will be completed in Room 401. Please be aware of appointment time for this procedure. Please arrive to room 103 at 2:00pm on October 24 for procedure. Please do not smoke prior to test.    Dressing Orders:  Keep heels dry    Treatment Orders:  Avoid Pressure to wound site. Off-load heels by applying heel-lift boots or \"float\" heels by placing pillows under calves so that heels do not touch mattress. Can be ordered online- [employer] or [employer]    Continue Protein rich diet (unless restricted by your physician)  Take Multivitamin daily    Please use Roho cushion in chair  Please use low air loss bed      [address] [place] [address],[address] follow up visit _____1 week with Shanon________________________  (Please note your next appointment above and if you are unable to keep, kindly give a 24 hour notice. Thank you.)          If you experience any of the following, please call the [facility] [address] during business hours:    * Increase in Pain  * Temperature over 101  * Increase in drainage from your wound  * Drainage with a foul odor  * Bleeding  * Increase in swelling  * Need for compression bandage changes due to slippage, breakthrough drainage. If you need medical attention outside of the business hours of the [address] please contact your PCP or go to the nearest emergency room.

## 2022-10-24 ENCOUNTER — HOSPITAL ENCOUNTER (OUTPATIENT)
Dept: WOUND CARE | Age: 87
Discharge: HOME | End: 2022-10-24
Payer: MEDICARE

## 2022-10-24 ENCOUNTER — HOSPITAL ENCOUNTER (OUTPATIENT)
Dept: NON INVASIVE DIAGNOSTICS | Age: 87
Discharge: HOME | End: 2022-10-24
Payer: MEDICARE

## 2022-10-24 VITALS
WEIGHT: 130 LBS | TEMPERATURE: 96.9 F | HEART RATE: 76 BPM | BODY MASS INDEX: 25.52 KG/M2 | SYSTOLIC BLOOD PRESSURE: 126 MMHG | DIASTOLIC BLOOD PRESSURE: 54 MMHG | RESPIRATION RATE: 20 BRPM | HEIGHT: 60 IN

## 2022-10-24 DIAGNOSIS — L89.610 UNSTAGEABLE PRESSURE ULCER OF RIGHT HEEL (HCC): ICD-10-CM

## 2022-10-24 DIAGNOSIS — L97.422: ICD-10-CM

## 2022-10-24 DIAGNOSIS — I73.9 PAD (PERIPHERAL ARTERY DISEASE) (HCC): ICD-10-CM

## 2022-10-24 DIAGNOSIS — L89.620 UNSTAGEABLE PRESSURE ULCER OF LEFT HEEL (HCC): Primary | ICD-10-CM

## 2022-10-24 PROCEDURE — 93923 UPR/LXTR ART STDY 3+ LVLS: CPT

## 2022-10-24 PROCEDURE — 99213 OFFICE O/P EST LOW 20 MIN: CPT

## 2022-10-24 PROCEDURE — 99212 OFFICE O/P EST SF 10 MIN: CPT | Performed by: NURSE PRACTITIONER

## 2022-10-24 NOTE — PROGRESS NOTES
[address]   Progress Note and Procedure Note      Michael Bowie RECORD NUMBER:  [id number]  AGE: 80 y.o. GENDER: female  : 1930  EPISODE DATE:  10/24/2022    Subjective:     Chief Complaint   Patient presents with    Wound Check     Bilateral heels         HISTORY of PRESENT ILLNESS JULIAN Shrestha is a 80 y.o. female who presents today for wound/ulcer evaluation. History of Wound Context: bilateral heel wound follow up/eval and treat    Ulcer Identification:  Ulcer Type: pressure  Contributing Factors: chronic pressure, shear force, and arterial insufficiency    Wound: N/A        PAST MEDICAL HISTORY        Diagnosis Date    CAD (coronary artery disease) 2013    Cardiomyopathy ([address] 2013    EF 25%    CHF (congestive heart failure) ([facility])     Hyperlipemia     Dr. Alejandro Soto manages    Status post coronary artery stent placement 2013    LAD       PAST SURGICAL HISTORY    Past Surgical History:   Procedure Laterality Date    APPENDECTOMY      BACK SURGERY      CARDIAC CATHETERIZATION  2013   [facility]    EF 20-25%    TONSILLECTOMY         FAMILY HISTORY    Family History   Problem Relation Age of Onset    Cancer Sister        SOCIAL HISTORY    Social History     Tobacco Use    Smoking status: Never    Smokeless tobacco: Never   Substance Use Topics    Alcohol use: No    Drug use: No       ALLERGIES    Allergies   Allergen Reactions    Codeine      Sick to her stomach    Cortizone      Sick to her stomach    Penicillins      Sick to her stomach    Sulfa Antibiotics      Sick to her stomach       MEDICATIONS    Current Outpatient Medications on File Prior to Encounter   Medication Sig Dispense Refill    carvedilol (COREG) 3.125 MG tablet TAKE 1 TABLET BY MOUTH 2 TIMES DAILY (WITH MEALS) 60 tablet 0    lisinopril (PRINIVIL;ZESTRIL) 5 MG tablet Take 1 tablet by mouth daily 30 tablet 3    pravastatin (PRAVACHOL) 40 MG tablet Take 40 mg by mouth daily. HYDROcodone-acetaminophen (NORCO) 5-325 MG per tablet Take 1 tablet by mouth every 6 hours as needed for Pain. furosemide (LASIX) 40 MG tablet Take 40 mg by mouth daily. (Patient not taking: Reported on 10/19/2022)      aspirin 81 MG tablet Take 81 mg by mouth daily. No current facility-administered medications on file prior to encounter. REVIEW OF SYSTEMS    Pertinent items are noted in HPI.     Objective:      BP (!) 126/54   Pulse 76   Temp 96.9 °F (36.1 °C) (Temporal)   Resp 20   Ht 5' (1.524 m)   Wt 130 lb (59 kg)   BMI 25.39 kg/m²     Wt Readings from Last 3 Encounters:   10/24/22 130 lb (59 kg)   10/19/22 130 lb (59 kg)   01/24/18 165 lb (74.8 kg)       PHYSICAL EXAM    General Appearance: alert and oriented to person, place and time, well developed and well- nourished, in no acute distress  Skin: warm and dry, no rash or erythema  Head: normocephalic and atraumatic  Eyes: pupils equal, round, and reactive to light, extraocular eye movements intact, conjunctivae normal  ENT: tympanic membrane, external ear and ear canal normal bilaterally, nose without deformity, nasal mucosa and turbinates normal without polyps  Neck: supple and non-tender without mass, no thyromegaly or thyroid nodules, no cervical lymphadenopathy  Pulmonary/Chest: clear to auscultation bilaterally- no wheezes, rales or rhonchi, normal air movement, no respiratory distress  Extremities: no cyanosis, clubbing or edema  Musculoskeletal: normal range of motion, no joint swelling, deformity or tenderness  Neurologic: reflexes normal and symmetric, no cranial nerve deficit, gait, coordination and speech normal      Assessment:      Patient Active Problem List   Diagnosis Code    Ischemic dilated cardiomyopathy (HCC) I25.5, I42.0    Status post coronary artery stent placement Z95.5    Mixed hyperlipidemia E78.2    CAP (community acquired pneumonia) J18.9    Cholelithiasis K80.20    Diverticulosis K57.90    Hyperlipemia E78.5 Depth (cm) 0.1 cm 10/24/22 1531   Wound Surface Area (cm^2) 6.25 cm^2 10/24/22 1531   Change in Wound Size % (l*w) 68.75 10/24/22 1531   Wound Volume (cm^3) 0.625 cm^3 10/24/22 1531   Wound Healing % 69 10/24/22 1531   Wound Assessment Eschar dry 10/24/22 1531   Drainage Amount None 10/24/22 1531   Odor None 10/24/22 1531   Nikki-wound Assessment Intact 10/24/22 1531   Margins Flat/open edges 10/24/22 1531   Wound Thickness Description not for Pressure Injury Full thickness 10/24/22 1531   Number of days: 5       Plan:     Problem List Items Addressed This Visit          Circulatory    PAD (peripheral artery disease) (HCC)       Other    * (Principal) Unstageable pressure ulcer of left heel (HCC) - Primary    Unstageable pressure ulcer of right heel ([address]           Treatment Note please see attached Discharge Instructions    In my professional opinion this patient would benefit from HBO Therapy: No    Written patient dismissal instructions given to patient and signed by patient or POA. Ms. Salvatore Pacheco is actually improving with keeping the area drier. She was unable to wear felt/foam.  Her JENNIFER shows tibial disease but she is improving at this point so hold off on vascular consult. Follow up in 2 weeks. Discharge [address] and Hyper[facility]ic Oxygen Therapy   Physician Orders and Discharge Instructions  [address]  Telephone: [phone]    NAME:  Kevin Drafts:  5/29/1930  MEDICAL RECORD NUMBER:  [id number]  DATE:  10/24/2022    Discharge condition: Stable    Discharge to: Home    Left via:Private automobile    Accompanied by:  friend    ECF/HHA: none    Dressing Orders:   Keep heels dry     Treatment Orders:   Avoid Pressure to wound site. Off-load heels by applying heel-lift boots or \"float\" heels by placing pillows under calves so that heels do not touch mattress.    Can be ordered online- [employer] or [employer]     Continue Protein rich diet (unless restricted by your physician)   Take Multivitamin daily     Please use Roho cushion in chair   Please use low air loss bed    [address] [address] follow up visit _____2 weeks with Shanon________________________  (Please note your next appointment above and if you are unable to keep, kindly give a 24 hour notice. Thank you.)          If you experience any of the following, please call the [facility] during business hours:    * Increase in Pain  * Temperature over 101  * Increase in drainage from your wound  * Drainage with a foul odor  * Bleeding  * Increase in swelling  * Need for compression bandage changes due to slippage, breakthrough drainage. If you need medical attention outside of the business hours of the [facility] please contact your PCP or go to the nearest emergency room.         Electronically signed by IZA Justin CNP on 10/24/2022 at 4:31 PM

## 2022-10-24 NOTE — DISCHARGE INSTRUCTIONS
[address] and [facility] Therapy   Physician Orders and Discharge Instructions  [address]  Telephone: [phone]    NAME:  Darby Fisher  YOB: 1930  MEDICAL RECORD NUMBER:  [id number]  DATE:  10/24/2022    Discharge condition: Stable    Discharge to: Home    Left via:Private automobile    Accompanied by:  friend    ECF/HHA: none    Dressing Orders:   Keep heels dry     Treatment Orders:   Avoid Pressure to wound site. Off-load heels by applying heel-lift boots or \"float\" heels by placing pillows under calves so that heels do not touch mattress. Can be ordered online- [employer] or [employer]     Continue Protein rich diet (unless restricted by your physician)   Take Multivitamin daily     Please use Roho cushion in chair   Please use low air loss bed    [address] [facility] follow up visit _____2 weeks with Shanon________________________  (Please note your next appointment above and if you are unable to keep, kindly give a 24 hour notice. Thank you.)          If you experience any of the following, please call the [facility] during business hours:    * Increase in Pain  * Temperature over 101  * Increase in drainage from your wound  * Drainage with a foul odor  * Bleeding  * Increase in swelling  * Need for compression bandage changes due to slippage, breakthrough drainage. If you need medical attention outside of the business hours of the [facility] [address] please contact your PCP or go to the nearest emergency room.

## 2022-11-07 NOTE — DISCHARGE INSTRUCTIONS
[address] and [facility] Therapy   Physician Orders and Discharge Instructions  [address]  Telephone: [phone]    NAME:  Adelia Diaz  YOB: 1930  MEDICAL RECORD NUMBER:  [id number]  DATE:  11/7/2022    Discharge condition: Stable    Discharge to: Home    Left via:Private automobile    Accompanied by:  friend    ECF/HHA: none     Dressing Orders:   Keep heels dry     Treatment Orders:   Avoid Pressure to wound site. Off-load heels by applying heel-lift boots or \"float\" heels by placing pillows under calves so that heels do not touch mattress. Continue Protein rich diet (unless restricted by your physician)   Take Multivitamin daily     Please use Roho cushion in chair   Please use low air loss bed      [address] [place] [address],[address] follow up visit ___4 weeks with Shanon__________________________  (Please note your next appointment above and if you are unable to keep, kindly give a 24 hour notice. Thank you.)          If you experience any of the following, please call the [facility] [address] during business hours:    * Increase in Pain  * Temperature over 101  * Increase in drainage from your wound  * Drainage with a foul odor  * Bleeding  * Increase in swelling  * Need for compression bandage changes due to slippage, breakthrough drainage. If you need medical attention outside of the business hours of the [address] please contact your PCP or go to the nearest emergency room.

## 2022-11-08 ENCOUNTER — HOSPITAL ENCOUNTER (OUTPATIENT)
Dept: WOUND CARE | Age: 87
Discharge: HOME | End: 2022-11-08
Payer: MEDICARE

## 2022-11-08 VITALS
WEIGHT: 130 LBS | BODY MASS INDEX: 25.52 KG/M2 | DIASTOLIC BLOOD PRESSURE: 54 MMHG | HEART RATE: 80 BPM | HEIGHT: 60 IN | TEMPERATURE: 98.8 F | SYSTOLIC BLOOD PRESSURE: 126 MMHG | RESPIRATION RATE: 20 BRPM

## 2022-11-08 DIAGNOSIS — L89.620 UNSTAGEABLE PRESSURE ULCER OF LEFT HEEL (HCC): Primary | ICD-10-CM

## 2022-11-08 DIAGNOSIS — I73.9 PAD (PERIPHERAL ARTERY DISEASE) (HCC): ICD-10-CM

## 2022-11-08 DIAGNOSIS — L89.610 UNSTAGEABLE PRESSURE ULCER OF RIGHT HEEL (HCC): ICD-10-CM

## 2022-11-08 PROCEDURE — 99212 OFFICE O/P EST SF 10 MIN: CPT | Performed by: NURSE PRACTITIONER

## 2022-11-08 PROCEDURE — 99213 OFFICE O/P EST LOW 20 MIN: CPT

## 2022-11-08 RX ORDER — LIDOCAINE 40 MG/G
CREAM TOPICAL ONCE
OUTPATIENT
Start: 2022-11-08 | End: 2022-11-08

## 2022-11-08 RX ORDER — LIDOCAINE HYDROCHLORIDE 20 MG/ML
JELLY TOPICAL ONCE
OUTPATIENT
Start: 2022-11-08 | End: 2022-11-08

## 2022-11-08 RX ORDER — LIDOCAINE HYDROCHLORIDE 40 MG/ML
SOLUTION TOPICAL ONCE
OUTPATIENT
Start: 2022-11-08 | End: 2022-11-08

## 2022-11-08 RX ORDER — LIDOCAINE 50 MG/G
OINTMENT TOPICAL ONCE
OUTPATIENT
Start: 2022-11-08 | End: 2022-11-08

## 2022-11-08 ASSESSMENT — PAIN DESCRIPTION - ORIENTATION: ORIENTATION: RIGHT;LEFT

## 2022-11-08 ASSESSMENT — PAIN SCALES - GENERAL: PAINLEVEL_OUTOF10: 5

## 2022-11-08 ASSESSMENT — PAIN DESCRIPTION - LOCATION: LOCATION: FOOT

## 2022-11-08 NOTE — PROGRESS NOTES
[address]   Progress Note and Procedure Note      Michael Bowie RECORD NUMBER:  [id number]  AGE: 80 y.o. GENDER: female  : 1930  EPISODE DATE:  2022    Subjective:     Chief Complaint   Patient presents with    Wound Check     Bilateral heel wounds         HISTORY of PRESENT ILLNESS HPI     Timoteo Woody is a 80 y.o. female who presents today for wound/ulcer evaluation. History of Wound Context: bilateral heel wound follow up/eval and treat    Ulcer Identification:  Ulcer Type: pressure  Contributing Factors: arterial insufficiency    Wound: N/A        PAST MEDICAL HISTORY        Diagnosis Date    CAD (coronary artery disease) 2013    Cardiomyopathy ([address] 2013    EF 25%    CHF (congestive heart failure) ([facility])     Hyperlipemia     Dr. Kristel Isaacs manages    Status post coronary artery stent placement 2013    LAD       PAST SURGICAL HISTORY    Past Surgical History:   Procedure Laterality Date    APPENDECTOMY      BACK SURGERY      CARDIAC CATHETERIZATION  2013   [facility]    EF 20-25%    TONSILLECTOMY         FAMILY HISTORY    Family History   Problem Relation Age of Onset    Cancer Sister        SOCIAL HISTORY    Social History     Tobacco Use    Smoking status: Never    Smokeless tobacco: Never   Substance Use Topics    Alcohol use: No    Drug use: No       ALLERGIES    Allergies   Allergen Reactions    Codeine      Sick to her stomach    Cortizone      Sick to her stomach    Penicillins      Sick to her stomach    Sulfa Antibiotics      Sick to her stomach       MEDICATIONS    Current Outpatient Medications on File Prior to Encounter   Medication Sig Dispense Refill    carvedilol (COREG) 3.125 MG tablet TAKE 1 TABLET BY MOUTH 2 TIMES DAILY (WITH MEALS) 60 tablet 0    lisinopril (PRINIVIL;ZESTRIL) 5 MG tablet Take 1 tablet by mouth daily 30 tablet 3    pravastatin (PRAVACHOL) 40 MG tablet Take 40 mg by mouth daily.       HYDROcodone-acetaminophen ([facility]) 5-325 MG per tablet Take 1 tablet by mouth every 6 hours as needed for Pain. furosemide (LASIX) 40 MG tablet Take 40 mg by mouth daily. (Patient not taking: No sig reported)      aspirin 81 MG tablet Take 81 mg by mouth daily. No current facility-administered medications on file prior to encounter. REVIEW OF SYSTEMS    Pertinent items are noted in HPI.     Objective:      BP (!) 126/54   Pulse 80   Temp 98.8 °F (37.1 °C) (Temporal)   Resp 20   Ht 5' (1.524 m)   Wt 130 lb (59 kg)   BMI 25.39 kg/m²     Wt Readings from Last 3 Encounters:   11/08/22 130 lb (59 kg)   10/24/22 130 lb (59 kg)   10/19/22 130 lb (59 kg)       PHYSICAL EXAM    General Appearance: alert and oriented to person, place and time, well developed and well- nourished, in no acute distress  Skin: warm and dry, no rash or erythema  Head: normocephalic and atraumatic  Eyes: pupils equal, round, and reactive to light, extraocular eye movements intact, conjunctivae normal  ENT: tympanic membrane, external ear and ear canal normal bilaterally, nose without deformity, nasal mucosa and turbinates normal without polyps  Neck: supple and non-tender without mass, no thyromegaly or thyroid nodules, no cervical lymphadenopathy  Pulmonary/Chest: clear to auscultation bilaterally- no wheezes, rales or rhonchi, normal air movement, no respiratory distress  Extremities: no cyanosis, clubbing or edema  Musculoskeletal: normal range of motion, no joint swelling, deformity or tenderness  Neurologic: reflexes normal and symmetric, no cranial nerve deficit, gait, coordination and speech normal      Assessment:      Patient Active Problem List   Diagnosis Code    Ischemic dilated cardiomyopathy (HCC) I25.5, I42.0    Status post coronary artery stent placement Z95.5    Mixed hyperlipidemia E78.2    CAP (community acquired pneumonia) J18.9    Cholelithiasis K80.20    Diverticulosis K57.90    Hyperlipemia E78.5    Chronic systolic congestive heart failure [address] I50.22    Acute respiratory failure with hypoxia ([facility]) J96.01    AAA (abdominal aortic aneurysm) I71.40    Descending thoracic aortic aneurysm I71.23    Pneumonia of right lower lobe due to infectious organism J18.9    Calculus of gallbladder without cholecystitis without obstruction K80.20    Coronary artery disease involving native heart without angina pectoris I25.10    Diverticulosis of intestine without bleeding K57.90    Hyperlipidemia E78.5    Unstageable pressure ulcer of left heel ([facility]) L89.620    Unstageable pressure ulcer of right heel ([facility]) L89.610    PAD (peripheral artery disease) ([facility]) I73.9                 Wound 10/19/22 Heel Right wound 2- right heel unstageable (Active)   Wound Image   11/08/22 1408   Wound Etiology Pressure Unstageable 11/08/22 1408   Dressing Status Clean;Dry; Intact 11/08/22 1408   Wound Cleansed Soap and water 11/08/22 1408   Dressing/Treatment Open to air 11/08/22 1438   Wound Length (cm) 0.2 cm 11/08/22 1408   Wound Width (cm) 0.3 cm 11/08/22 1408   Wound Depth (cm) 0.1 cm 11/08/22 1408   Wound Surface Area (cm^2) 0.06 cm^2 11/08/22 1408   Change in Wound Size % (l*w) 99.04 11/08/22 1408   Wound Volume (cm^3) 0.006 cm^3 11/08/22 1408   Wound Healing % 99 11/08/22 1408   Wound Assessment Eschar dry 11/08/22 1408   Drainage Amount None 11/08/22 1408   Odor None 11/08/22 1408   Nikki-wound Assessment Intact 11/08/22 1408   Margins Flat/open edges 11/08/22 1408   Wound Thickness Description not for Pressure Injury Full thickness 11/08/22 1408   Number of days: 20       Wound 10/19/22 Heel Left wound 1- left heel unstagable (Active)   Wound Image   11/08/22 1408   Wound Etiology Pressure Unstageable 11/08/22 1408   Dressing Status Clean;Dry; Intact 11/08/22 1408   Wound Cleansed Soap and water 11/08/22 1408   Dressing/Treatment Open to air 11/08/22 1438   Wound Length (cm) 3 cm 11/08/22 1408   Wound Width (cm) 2.5 cm 11/08/22 1408   Wound Depth (cm) 0.1 cm 11/08/22 1408 Wound Surface Area (cm^2) 7.5 cm^2 11/08/22 1408   Change in Wound Size % (l*w) 62.5 11/08/22 1408   Wound Volume (cm^3) 0.75 cm^3 11/08/22 1408   Wound Healing % 63 11/08/22 1408   Wound Assessment Eschar dry 11/08/22 1408   Drainage Amount None 11/08/22 1408   Odor None 11/08/22 1408   Nikki-wound Assessment Intact 11/08/22 1408   Margins Flat/open edges 11/08/22 1408   Wound Thickness Description not for Pressure Injury Full thickness 11/08/22 1408   Number of days: 20       Plan:     Problem List Items Addressed This Visit          Circulatory    PAD (peripheral artery disease) (HCC)       Other    * (Principal) Unstageable pressure ulcer of left heel (HCC) - Primary    Unstageable pressure ulcer of right heel ([address]           Treatment Note please see attached Discharge Instructions    In my professional opinion this patient would benefit from HBO Therapy: No    Written patient dismissal instructions given to patient and signed by patient or POA. Ms. Florinda Menendez is healing well, she does report pain but due to her PAD and wound I would expect pain to be present. She will follow up in 4 weeks. Discharge [address] and Hyper[facility] Oxygen Therapy   Physician Orders and Discharge Instructions  [address]  Telephone: [phone]    NAME:  Calin Osborn:  5/29/1930  MEDICAL RECORD NUMBER:  [id number]  DATE:  11/7/2022    Discharge condition: Stable    Discharge to: Home    Left via:Private automobile    Accompanied by:  friend    ECF/HHA: none     Dressing Orders:   Keep heels dry     Treatment Orders:   Avoid Pressure to wound site. Off-load heels by applying heel-lift boots or \"float\" heels by placing pillows under calves so that heels do not touch mattress.      Continue Protein rich diet (unless restricted by your physician)   Take Multivitamin daily     Please use Roho cushion in chair   Please use low air loss bed      [facility] follow up visit ___4 weeks with Shanon__________________________  (Please note your next appointment above and if you are unable to keep, kindly give a 24 hour notice. Thank you.)          If you experience any of the following, please call the [facility] [address] during business hours:    * Increase in Pain  * Temperature over 101  * Increase in drainage from your wound  * Drainage with a foul odor  * Bleeding  * Increase in swelling  * Need for compression bandage changes due to slippage, breakthrough drainage. If you need medical attention outside of the business hours of the [address] please contact your PCP or go to the nearest emergency room.           Electronically signed by IZA Mart CNP on 11/8/2022 at 2:53 PM

## 2022-11-08 NOTE — PLAN OF CARE
Problem: Chronic Conditions and Co-morbidities  Goal: Patient's chronic conditions and co-morbidity symptoms are monitored and maintained or improved  Outcome: Progressing     Problem: Discharge Planning  Goal: Discharge to home or other facility with appropriate resources  Outcome: Progressing     Problem: Pain  Goal: Verbalizes/displays adequate comfort level or baseline comfort level  Outcome: Progressing     Problem: Wound:  Goal: Will show signs of wound healing; wound closure and no evidence of infection  Description: Will show signs of wound healing; wound closure and no evidence of infection  Outcome: Progressing     Problem: Arterial:  Goal: Optimize blood flow for wound healing  Description: Optimize blood flow for wound healing  Outcome: Progressing     Problem: Pressure Ulcer:  Goal: Signs of wound healing will improve  Description: Signs of wound healing will improve  Outcome: Progressing  Goal: Absence of new pressure ulcer  Description: Absence of new pressure ulcer  Outcome: Progressing  Goal: Will show no infection signs and symptoms  Description: Will show no infection signs and symptoms  Outcome: Progressing

## 2022-12-13 ENCOUNTER — HOSPITAL ENCOUNTER (OUTPATIENT)
Dept: WOUND CARE | Age: 87
Discharge: HOME | End: 2022-12-13

## 2022-12-13 VITALS
WEIGHT: 130 LBS | RESPIRATION RATE: 20 BRPM | BODY MASS INDEX: 25.52 KG/M2 | SYSTOLIC BLOOD PRESSURE: 126 MMHG | TEMPERATURE: 98 F | DIASTOLIC BLOOD PRESSURE: 54 MMHG | HEART RATE: 80 BPM | HEIGHT: 60 IN

## 2022-12-13 DIAGNOSIS — L89.610 UNSTAGEABLE PRESSURE ULCER OF RIGHT HEEL (HCC): ICD-10-CM

## 2022-12-13 DIAGNOSIS — I73.9 PAD (PERIPHERAL ARTERY DISEASE) (HCC): Primary | ICD-10-CM

## 2022-12-13 DIAGNOSIS — L89.620 UNSTAGEABLE PRESSURE ULCER OF LEFT HEEL (HCC): ICD-10-CM

## 2022-12-13 RX ORDER — LIDOCAINE 50 MG/G
OINTMENT TOPICAL ONCE
OUTPATIENT
Start: 2022-12-13 | End: 2022-12-13

## 2022-12-13 RX ORDER — LIDOCAINE 40 MG/G
CREAM TOPICAL ONCE
OUTPATIENT
Start: 2022-12-13 | End: 2022-12-13

## 2022-12-13 RX ORDER — LIDOCAINE HYDROCHLORIDE 20 MG/ML
JELLY TOPICAL ONCE
OUTPATIENT
Start: 2022-12-13 | End: 2022-12-13

## 2022-12-13 RX ORDER — LIDOCAINE HYDROCHLORIDE 40 MG/ML
SOLUTION TOPICAL ONCE
OUTPATIENT
Start: 2022-12-13 | End: 2022-12-13

## 2022-12-13 NOTE — PROGRESS NOTES
[address]   Progress Note and Procedure Note      Michael Azeb RECORD NUMBER:  [id number]  AGE: 80 y.o. GENDER: female  : 1930  EPISODE DATE:  2022    Subjective:     Chief Complaint   Patient presents with    Wound Check     Heel wounds      HISTORY of PRESENT ILLNESS HPI     Karo Gomes is a 80 y.o. female who presents today for wound/ulcer evaluation. History of Wound Context: bilateral heel wound follow up/eval and treat    Ulcer Identification:  Ulcer Type: pressure  Contributing Factors: chronic pressure and shear force    Wound: N/A        PAST MEDICAL HISTORY        Diagnosis Date    CAD (coronary artery disease) 2013    Cardiomyopathy ([address] 2013    EF 25%    CHF (congestive heart failure) ([facility])     Hyperlipemia     Dr. Mark Syed manages    Status post coronary artery stent placement 2013    LAD       PAST SURGICAL HISTORY    Past Surgical History:   Procedure Laterality Date    APPENDECTOMY      BACK SURGERY      CARDIAC CATHETERIZATION  2013   [facility]    EF 20-25%    TONSILLECTOMY         FAMILY HISTORY    Family History   Problem Relation Age of Onset    Cancer Sister        SOCIAL HISTORY    Social History     Tobacco Use    Smoking status: Never    Smokeless tobacco: Never   Substance Use Topics    Alcohol use: No    Drug use: No       ALLERGIES    Allergies   Allergen Reactions    Codeine      Sick to her stomach    Cortizone      Sick to her stomach    Penicillins      Sick to her stomach    Sulfa Antibiotics      Sick to her stomach       MEDICATIONS    Current Outpatient Medications on File Prior to Encounter   Medication Sig Dispense Refill    carvedilol (COREG) 3.125 MG tablet TAKE 1 TABLET BY MOUTH 2 TIMES DAILY (WITH MEALS) 60 tablet 0    lisinopril (PRINIVIL;ZESTRIL) 5 MG tablet Take 1 tablet by mouth daily 30 tablet 3    pravastatin (PRAVACHOL) 40 MG tablet Take 40 mg by mouth daily.       HYDROcodone-acetaminophen (NORCO) 5-325 MG per tablet Take 1 tablet by mouth every 6 hours as needed for Pain. furosemide (LASIX) 40 MG tablet Take 40 mg by mouth daily      aspirin 81 MG tablet Take 81 mg by mouth daily. No current facility-administered medications on file prior to encounter. REVIEW OF SYSTEMS    Pertinent items are noted in HPI.     Objective:      BP (!) 126/54   Pulse 80   Temp 98 °F (36.7 °C) (Temporal)   Resp 20   Ht 5' (1.524 m)   Wt 130 lb (59 kg)   BMI 25.39 kg/m²     Wt Readings from Last 3 Encounters:   12/13/22 130 lb (59 kg)   11/08/22 130 lb (59 kg)   10/24/22 130 lb (59 kg)       PHYSICAL EXAM    General Appearance: alert and oriented to person, place and time, well developed and well- nourished, in no acute distress  Skin: warm and dry, no rash or erythema  Head: normocephalic and atraumatic  Eyes: pupils equal, round, and reactive to light, extraocular eye movements intact, conjunctivae normal  ENT: tympanic membrane, external ear and ear canal normal bilaterally, nose without deformity, nasal mucosa and turbinates normal without polyps  Neck: supple and non-tender without mass, no thyromegaly or thyroid nodules, no cervical lymphadenopathy  Pulmonary/Chest: clear to auscultation bilaterally- no wheezes, rales or rhonchi, normal air movement, no respiratory distress  Musculoskeletal: normal range of motion, no joint swelling, deformity or tenderness  Neurologic: reflexes normal and symmetric, no cranial nerve deficit, gait, coordination and speech normal      Assessment:      Patient Active Problem List   Diagnosis Code    Ischemic dilated cardiomyopathy (HCC) I25.5, I42.0    Status post coronary artery stent placement Z95.5    Mixed hyperlipidemia E78.2    CAP (community acquired pneumonia) J18.9    Cholelithiasis K80.20    Diverticulosis K57.90    Hyperlipemia E78.5    Chronic systolic congestive heart failure (HCC) I50.22    Acute respiratory failure with hypoxia (HCC) J96.01    AAA (abdominal aortic aneurysm) I71.40    Descending thoracic aortic aneurysm I71.23    Pneumonia of right lower lobe due to infectious organism J18.9    Calculus of gallbladder without cholecystitis without obstruction K80.20    Coronary artery disease involving native heart without angina pectoris I25.10    Diverticulosis of intestine without bleeding K57.90    Hyperlipidemia E78.5    Unstageable pressure ulcer of left heel (HCC) L89.620    Unstageable pressure ulcer of right heel (HCC) L89.610    PAD (peripheral artery disease) ([facility]) I73.9       Wound 10/19/22 Heel Right wound 2- right heel unstageable (Active)   Wound Image   12/13/22 1320   Wound Etiology Pressure Unstageable 12/13/22 1320   Dressing Status Clean;Dry; Intact 12/13/22 1320   Wound Cleansed Soap and water 12/13/22 1320   Dressing/Treatment Open to air 12/13/22 1352   Wound Length (cm) 0 cm 12/13/22 1320   Wound Width (cm) 0 cm 12/13/22 1320   Wound Depth (cm) 0 cm 12/13/22 1320   Wound Surface Area (cm^2) 0 cm^2 12/13/22 1320   Change in Wound Size % (l*w) 100 12/13/22 1320   Wound Volume (cm^3) 0 cm^3 12/13/22 1320   Wound Healing % 100 12/13/22 1320   Wound Assessment Eschar dry 12/13/22 1320   Drainage Amount None 12/13/22 1320   Odor None 12/13/22 1320   Nikki-wound Assessment Intact 12/13/22 1320   Margins Flat/open edges 12/13/22 1320   Wound Thickness Description not for Pressure Injury Full thickness 11/08/22 1408   Number of days: 55       Wound 10/19/22 Heel Left wound 1- left heel unstagable (Active)   Wound Image   12/13/22 1320   Wound Etiology Pressure Unstageable 12/13/22 1320   Dressing Status Clean;Dry; Intact 12/13/22 1320   Wound Cleansed Soap and water 12/13/22 1320   Dressing/Treatment Open to air 12/13/22 1352   Wound Length (cm) 2.5 cm 12/13/22 1320   Wound Width (cm) 2.2 cm 12/13/22 1320   Wound Depth (cm) 0.1 cm 12/13/22 1320   Wound Surface Area (cm^2) 5.5 cm^2 12/13/22 1320   Change in Wound Size % (l*w) 72.5 12/13/22 1320 Wound Volume (cm^3) 0.55 cm^3 12/13/22 1320   Wound Healing % 73 12/13/22 1320   Post-Procedure Length (cm) 2.5 cm 12/13/22 1352   Post-Procedure Width (cm) 2.2 cm 12/13/22 1352   Post-Procedure Depth (cm) 0.1 cm 12/13/22 1352   Post-Procedure Surface Area (cm^2) 5.5 cm^2 12/13/22 1352   Post-Procedure Volume (cm^3) 0.55 cm^3 12/13/22 1352   Wound Assessment Eschar dry 12/13/22 1320   Drainage Amount None 12/13/22 1320   Odor None 12/13/22 1320   Nikki-wound Assessment Intact 12/13/22 1320   Margins Flat/open edges 12/13/22 1320   Wound Thickness Description not for Pressure Injury Full thickness 12/13/22 1320   Number of days: 55           Procedure Note  Indications:  Based on my examination of this patient's wound(s)/ulcer(s) today, debridement is required to promote healing and evaluate the wound base. Performed by: IZA Aceves CNP    Consent obtained:  Yes    Time out taken:  Yes    Pain Control:         Debridement:Non-excisional Debridement    Using #15 blade scalpel the wound(s)/ulcer(s) was/were sharply debrided down through and including the removal of epidermis and dermis. Devitalized Tissue Debrided:  fibrin, biofilm, slough, necrotic/eschar, and exudate    Pre Debridement Measurements:  Are located in the Wound/Ulcer Documentation Flow Sheet    Wound/Ulcer #: 1    Post Debridement Measurements:  Wound/Ulcer Descriptions are Pre Debridement except measurements:      Percent of Wound/Ulcer Debrided: 100%    Total Surface Area Debrided:  5.5 sq cm     Estimated Blood Loss:  None    Hemostasis Achieved:  not needed    Procedural Pain:  5  / 10     Post Procedural Pain:  0 / 10     Response to treatment:  Well tolerated by patient.          Diabetic/Pressure/Non Pressure Ulcers only:  Ulcer: Pressure ulcer, unstageable            Plan:     Problem List Items Addressed This Visit          Circulatory    PAD (peripheral artery disease) ([facility] - Primary       Other    * (Principal) Unstageable pressure ulcer of left heel (HCC)    Unstageable pressure ulcer of right heel ([address]       Treatment Note please see attached Discharge Instructions    In my professional opinion this patient would benefit from HBO Therapy: No    Written patient dismissal instructions given to patient and signed by patient or POA. Ms. Joaquín Shah is healing well. She is swelling today so I gave her tubi- and instructed her on wearing them daily. I do not think this should put too much pressure on the wound. Follow up in 1 month.   Electronically signed by IZA White CNP on 12/13/2022 at 1:54 PM

## 2022-12-13 NOTE — DISCHARGE INSTRUCTIONS
[address] [facility]   Physician Orders and Discharge Instructions  [address]  Telephone: [phone]    NAME:  Myrtle Ortega  YOB: 1930  MEDICAL RECORD NUMBER:  [id number]  DATE:  12/13/2022    Discharge condition: Stable    Discharge to: Home    Left via:Private automobile    Accompanied by:  friend    ECF/HHA: none     Dressing Orders:   Keep heels dry     Treatment Orders:   Avoid Pressure to wound site. Off-load heels by applying heel-lift boots or \"float\" heels by placing pillows under calves so that heels do not touch mattress. Continue Protein rich diet (unless restricted by your physician)   Take Multivitamin daily     Please use Roho cushion in chair   Please use low air loss bed    [address] [place] [address],[address] follow up visit _______4 weeks with Shanon______________________  (Please note your next appointment above and if you are unable to keep, kindly give a 24 hour notice. Thank you.)          If you experience any of the following, please call the [facility] during business hours:    * Increase in Pain  * Temperature over 101  * Increase in drainage from your wound  * Drainage with a foul odor  * Bleeding  * Increase in swelling  * Need for compression bandage changes due to slippage, breakthrough drainage. If you need medical attention outside of the business hours of the [facility] please contact your PCP or go to the nearest emergency room.

## 2022-12-14 NOTE — PLAN OF CARE
Problem: Chronic Conditions and Co-morbidities  Goal: Patient's chronic conditions and co-morbidity symptoms are monitored and maintained or improved  Outcome: Progressing     Problem: Discharge Planning  Goal: Discharge to home or other facility with appropriate resources  Outcome: Progressing     Problem: Wound:  Goal: Will show signs of wound healing; wound closure and no evidence of infection  Description: Will show signs of wound healing; wound closure and no evidence of infection  Outcome: Progressing     Problem: Pressure Ulcer:  Goal: Signs of wound healing will improve  Description: Signs of wound healing will improve  Outcome: Progressing  Goal: Absence of new pressure ulcer  Description: Absence of new pressure ulcer  Outcome: Progressing  Goal: Will show no infection signs and symptoms  Description: Will show no infection signs and symptoms  Outcome: Progressing     Problem: Arterial:  Goal: Optimize blood flow for wound healing  Description: Optimize blood flow for wound healing  Outcome: Progressing     Problem: Falls - Risk of:  Goal: Will remain free from falls  Description: Will remain free from falls  Outcome: Progressing

## 2022-12-31 ENCOUNTER — HOSPITAL ENCOUNTER (INPATIENT)
Age: 87
LOS: 6 days | Discharge: HOME | End: 2023-01-06
Attending: EMERGENCY MEDICINE
Payer: MEDICARE

## 2022-12-31 ENCOUNTER — APPOINTMENT (OUTPATIENT)
Dept: GENERAL RADIOLOGY | Age: 87
End: 2022-12-31
Payer: MEDICARE

## 2022-12-31 DIAGNOSIS — I50.9 CONGESTIVE HEART FAILURE, UNSPECIFIED HF CHRONICITY, UNSPECIFIED HEART FAILURE TYPE (HCC): Primary | ICD-10-CM

## 2022-12-31 PROBLEM — Z91.199 MEDICALLY NONCOMPLIANT: Status: ACTIVE | Noted: 2022-12-31

## 2022-12-31 PROBLEM — I50.43 SYSTOLIC AND DIASTOLIC CHF, ACUTE ON CHRONIC (HCC): Status: ACTIVE | Noted: 2022-12-31

## 2022-12-31 LAB
ALBUMIN SERPL-MCNC: 3.8 G/DL (ref 3.5–5.2)
ALP BLD-CCNC: 75 U/L (ref 35–104)
ALT SERPL-CCNC: <5 U/L (ref 5–33)
ANION GAP SERPL CALCULATED.3IONS-SCNC: 11 MMOL/L (ref 7–19)
APTT: 26.8 SEC (ref 26–36.2)
AST SERPL-CCNC: 14 U/L (ref 5–32)
BASE EXCESS ARTERIAL: 2.1 MMOL/L (ref -2–2)
BASOPHILS ABSOLUTE: 0.1 K/UL (ref 0–0.2)
BASOPHILS RELATIVE PERCENT: 0.5 % (ref 0–1)
BILIRUB SERPL-MCNC: 1.1 MG/DL (ref 0.2–1.2)
BUN BLDV-MCNC: 14 MG/DL (ref 8–23)
CALCIUM SERPL-MCNC: 9.2 MG/DL (ref 8.2–9.6)
CARBOXYHEMOGLOBIN ARTERIAL: 3 % (ref 0–5)
CHLORIDE BLD-SCNC: 100 MMOL/L (ref 98–111)
CHOLESTEROL, TOTAL: 121 MG/DL (ref 160–199)
CO2: 26 MMOL/L (ref 22–29)
CREAT SERPL-MCNC: 0.8 MG/DL (ref 0.5–0.9)
EOSINOPHILS ABSOLUTE: 0.4 K/UL (ref 0–0.6)
EOSINOPHILS RELATIVE PERCENT: 3.6 % (ref 0–5)
GFR SERPL CREATININE-BSD FRML MDRD: >60 ML/MIN/{1.73_M2}
GLUCOSE BLD-MCNC: 119 MG/DL (ref 74–109)
HBA1C MFR BLD: 4.8 % (ref 4–6)
HCO3 ARTERIAL: 30 MMOL/L (ref 22–26)
HCT VFR BLD CALC: 39.7 % (ref 37–47)
HDLC SERPL-MCNC: 60 MG/DL (ref 65–121)
HEMOGLOBIN, ART, EXTENDED: 12.9 G/DL (ref 12–16)
HEMOGLOBIN: 12.4 G/DL (ref 12–16)
IMMATURE GRANULOCYTES #: 0 K/UL
INR BLD: 1.12 (ref 0.88–1.18)
LACTIC ACID: 2 MMOL/L (ref 0.5–1.9)
LDL CHOLESTEROL CALCULATED: 41 MG/DL
LYMPHOCYTES ABSOLUTE: 1.4 K/UL (ref 1.1–4.5)
LYMPHOCYTES RELATIVE PERCENT: 12.6 % (ref 20–40)
MAGNESIUM: 2 MG/DL (ref 1.7–2.3)
MCH RBC QN AUTO: 30.9 PG (ref 27–31)
MCHC RBC AUTO-ENTMCNC: 31.2 G/DL (ref 33–37)
MCV RBC AUTO: 99 FL (ref 81–99)
METHEMOGLOBIN ARTERIAL: 1.1 %
MODE: ABNORMAL
MONOCYTES ABSOLUTE: 0.5 K/UL (ref 0–0.9)
MONOCYTES RELATIVE PERCENT: 4.7 % (ref 0–10)
NEUTROPHILS ABSOLUTE: 8.7 K/UL (ref 1.5–7.5)
NEUTROPHILS RELATIVE PERCENT: 78.2 % (ref 50–65)
O2 CONTENT ARTERIAL: 17.5 ML/DL
O2 SAT, ARTERIAL: 95.4 %
O2 THERAPY: ABNORMAL
OXYGEN FLOW: 3
PCO2 ARTERIAL: 61 MMHG (ref 35–45)
PDW BLD-RTO: 14.6 % (ref 11.5–14.5)
PH ARTERIAL: 7.3 (ref 7.35–7.45)
PHOSPHORUS: 3.7 MG/DL (ref 2.5–4.5)
PLATELET # BLD: 145 K/UL (ref 130–400)
PMV BLD AUTO: 10.2 FL (ref 9.4–12.3)
PO2 ARTERIAL: 112 MMHG (ref 80–100)
POTASSIUM SERPL-SCNC: 4.1 MMOL/L (ref 3.5–5)
POTASSIUM, WHOLE BLOOD: 3.7
PRO-BNP: 6110 PG/ML (ref 0–1800)
PROTHROMBIN TIME: 14.4 SEC (ref 12–14.6)
RBC # BLD: 4.01 M/UL (ref 4.2–5.4)
SAMPLE SOURCE: ABNORMAL
SARS-COV-2, NAAT: NOT DETECTED
SODIUM BLD-SCNC: 137 MMOL/L (ref 136–145)
SPONT RATE(BPM): 19
TOTAL PROTEIN: 6.1 G/DL (ref 6.6–8.7)
TRIGL SERPL-MCNC: 99 MG/DL (ref 0–149)
TROPONIN: <0.01 NG/ML (ref 0–0.03)
TROPONIN: <0.01 NG/ML (ref 0–0.03)
TSH REFLEX FT4: 1.81 UIU/ML (ref 0.35–5.5)
WBC # BLD: 11.1 K/UL (ref 4.8–10.8)

## 2022-12-31 PROCEDURE — 6360000002 HC RX W HCPCS: Performed by: HOSPITALIST

## 2022-12-31 PROCEDURE — 83036 HEMOGLOBIN GLYCOSYLATED A1C: CPT

## 2022-12-31 PROCEDURE — 82803 BLOOD GASES ANY COMBINATION: CPT

## 2022-12-31 PROCEDURE — 85730 THROMBOPLASTIN TIME PARTIAL: CPT

## 2022-12-31 PROCEDURE — 87040 BLOOD CULTURE FOR BACTERIA: CPT

## 2022-12-31 PROCEDURE — 99285 EMERGENCY DEPT VISIT HI MDM: CPT

## 2022-12-31 PROCEDURE — 36600 WITHDRAWAL OF ARTERIAL BLOOD: CPT

## 2022-12-31 PROCEDURE — 36415 COLL VENOUS BLD VENIPUNCTURE: CPT

## 2022-12-31 PROCEDURE — 84100 ASSAY OF PHOSPHORUS: CPT

## 2022-12-31 PROCEDURE — 84484 ASSAY OF TROPONIN QUANT: CPT

## 2022-12-31 PROCEDURE — 80061 LIPID PANEL: CPT

## 2022-12-31 PROCEDURE — 87635 SARS-COV-2 COVID-19 AMP PRB: CPT

## 2022-12-31 PROCEDURE — 2700000000 HC OXYGEN THERAPY PER DAY

## 2022-12-31 PROCEDURE — 83880 ASSAY OF NATRIURETIC PEPTIDE: CPT

## 2022-12-31 PROCEDURE — 2580000003 HC RX 258: Performed by: HOSPITALIST

## 2022-12-31 PROCEDURE — 2140000000 HC CCU INTERMEDIATE R&B

## 2022-12-31 PROCEDURE — 85610 PROTHROMBIN TIME: CPT

## 2022-12-31 PROCEDURE — 85025 COMPLETE CBC W/AUTO DIFF WBC: CPT

## 2022-12-31 PROCEDURE — 83735 ASSAY OF MAGNESIUM: CPT

## 2022-12-31 PROCEDURE — 83605 ASSAY OF LACTIC ACID: CPT

## 2022-12-31 PROCEDURE — 93005 ELECTROCARDIOGRAM TRACING: CPT | Performed by: EMERGENCY MEDICINE

## 2022-12-31 PROCEDURE — 71045 X-RAY EXAM CHEST 1 VIEW: CPT

## 2022-12-31 PROCEDURE — 84443 ASSAY THYROID STIM HORMONE: CPT

## 2022-12-31 PROCEDURE — 80053 COMPREHEN METABOLIC PANEL: CPT

## 2022-12-31 PROCEDURE — 6370000000 HC RX 637 (ALT 250 FOR IP): Performed by: HOSPITALIST

## 2022-12-31 RX ORDER — ONDANSETRON 4 MG/1
4 TABLET, ORALLY DISINTEGRATING ORAL EVERY 8 HOURS PRN
Status: DISCONTINUED | OUTPATIENT
Start: 2022-12-31 | End: 2023-01-06 | Stop reason: HOSPADM

## 2022-12-31 RX ORDER — MECOBALAMIN 5000 MCG
5 TABLET,DISINTEGRATING ORAL NIGHTLY PRN
Status: DISCONTINUED | OUTPATIENT
Start: 2022-12-31 | End: 2023-01-06 | Stop reason: HOSPADM

## 2022-12-31 RX ORDER — NALOXONE HYDROCHLORIDE 0.4 MG/ML
0.4 INJECTION, SOLUTION INTRAMUSCULAR; INTRAVENOUS; SUBCUTANEOUS PRN
Status: DISCONTINUED | OUTPATIENT
Start: 2022-12-31 | End: 2023-01-06 | Stop reason: HOSPADM

## 2022-12-31 RX ORDER — ENOXAPARIN SODIUM 100 MG/ML
40 INJECTION SUBCUTANEOUS DAILY
Status: DISCONTINUED | OUTPATIENT
Start: 2022-12-31 | End: 2023-01-05

## 2022-12-31 RX ORDER — ONDANSETRON 2 MG/ML
4 INJECTION INTRAMUSCULAR; INTRAVENOUS EVERY 6 HOURS PRN
Status: DISCONTINUED | OUTPATIENT
Start: 2022-12-31 | End: 2023-01-06 | Stop reason: HOSPADM

## 2022-12-31 RX ORDER — CARVEDILOL 3.12 MG/1
3.12 TABLET ORAL 2 TIMES DAILY WITH MEALS
Status: DISCONTINUED | OUTPATIENT
Start: 2022-12-31 | End: 2023-01-06 | Stop reason: HOSPADM

## 2022-12-31 RX ORDER — ACETAMINOPHEN 325 MG/1
650 TABLET ORAL EVERY 6 HOURS PRN
Status: DISCONTINUED | OUTPATIENT
Start: 2022-12-31 | End: 2023-01-06 | Stop reason: HOSPADM

## 2022-12-31 RX ORDER — ACETAMINOPHEN 650 MG/1
650 SUPPOSITORY RECTAL EVERY 6 HOURS PRN
Status: DISCONTINUED | OUTPATIENT
Start: 2022-12-31 | End: 2023-01-06 | Stop reason: HOSPADM

## 2022-12-31 RX ORDER — LISINOPRIL 10 MG/1
5 TABLET ORAL DAILY
Status: DISCONTINUED | OUTPATIENT
Start: 2023-01-01 | End: 2023-01-06 | Stop reason: HOSPADM

## 2022-12-31 RX ORDER — SODIUM CHLORIDE 0.9 % (FLUSH) 0.9 %
5-40 SYRINGE (ML) INJECTION PRN
Status: DISCONTINUED | OUTPATIENT
Start: 2022-12-31 | End: 2023-01-06 | Stop reason: HOSPADM

## 2022-12-31 RX ORDER — SODIUM CHLORIDE 9 MG/ML
INJECTION, SOLUTION INTRAVENOUS PRN
Status: DISCONTINUED | OUTPATIENT
Start: 2022-12-31 | End: 2023-01-06 | Stop reason: HOSPADM

## 2022-12-31 RX ORDER — ATORVASTATIN CALCIUM 40 MG/1
40 TABLET, FILM COATED ORAL NIGHTLY
Status: DISCONTINUED | OUTPATIENT
Start: 2022-12-31 | End: 2023-01-06 | Stop reason: HOSPADM

## 2022-12-31 RX ORDER — CALCIUM CARBONATE 200(500)MG
500 TABLET,CHEWABLE ORAL 3 TIMES DAILY PRN
Status: DISCONTINUED | OUTPATIENT
Start: 2022-12-31 | End: 2023-01-06 | Stop reason: HOSPADM

## 2022-12-31 RX ORDER — POLYETHYLENE GLYCOL 3350 17 G/17G
17 POWDER, FOR SOLUTION ORAL DAILY PRN
Status: DISCONTINUED | OUTPATIENT
Start: 2022-12-31 | End: 2023-01-06 | Stop reason: HOSPADM

## 2022-12-31 RX ORDER — NITROGLYCERIN 0.4 MG/1
0.4 TABLET SUBLINGUAL EVERY 5 MIN PRN
Status: DISCONTINUED | OUTPATIENT
Start: 2022-12-31 | End: 2023-01-06 | Stop reason: HOSPADM

## 2022-12-31 RX ORDER — SODIUM CHLORIDE 0.9 % (FLUSH) 0.9 %
5-40 SYRINGE (ML) INJECTION EVERY 12 HOURS SCHEDULED
Status: DISCONTINUED | OUTPATIENT
Start: 2022-12-31 | End: 2023-01-06 | Stop reason: HOSPADM

## 2022-12-31 RX ORDER — ASPIRIN 81 MG/1
81 TABLET, CHEWABLE ORAL DAILY
Status: DISCONTINUED | OUTPATIENT
Start: 2023-01-01 | End: 2023-01-06 | Stop reason: HOSPADM

## 2022-12-31 RX ADMIN — CARVEDILOL 3.12 MG: 3.12 TABLET, FILM COATED ORAL at 23:41

## 2022-12-31 RX ADMIN — SODIUM CHLORIDE, PRESERVATIVE FREE 10 ML: 5 INJECTION INTRAVENOUS at 23:41

## 2022-12-31 RX ADMIN — ATORVASTATIN CALCIUM 40 MG: 40 TABLET, FILM COATED ORAL at 23:41

## 2022-12-31 RX ADMIN — FUROSEMIDE 5 MG/HR: 10 INJECTION, SOLUTION INTRAMUSCULAR; INTRAVENOUS at 23:36

## 2022-12-31 RX ADMIN — SODIUM CHLORIDE: 9 INJECTION, SOLUTION INTRAVENOUS at 23:40

## 2022-12-31 RX ADMIN — ENOXAPARIN SODIUM 40 MG: 100 INJECTION SUBCUTANEOUS at 23:49

## 2022-12-31 RX ADMIN — Medication 5 MG: at 23:41

## 2023-01-01 LAB
ANION GAP SERPL CALCULATED.3IONS-SCNC: 13 MMOL/L (ref 7–19)
BASOPHILS ABSOLUTE: 0.1 K/UL (ref 0–0.2)
BASOPHILS RELATIVE PERCENT: 0.6 % (ref 0–1)
BILIRUBIN URINE: NEGATIVE
BLOOD, URINE: NEGATIVE
BUN BLDV-MCNC: 15 MG/DL (ref 8–23)
CALCIUM SERPL-MCNC: 8.6 MG/DL (ref 8.2–9.6)
CHLORIDE BLD-SCNC: 102 MMOL/L (ref 98–111)
CLARITY: CLEAR
CO2: 27 MMOL/L (ref 22–29)
COLOR: YELLOW
CREAT SERPL-MCNC: 0.8 MG/DL (ref 0.5–0.9)
EOSINOPHILS ABSOLUTE: 0.2 K/UL (ref 0–0.6)
EOSINOPHILS RELATIVE PERCENT: 1.8 % (ref 0–5)
GFR SERPL CREATININE-BSD FRML MDRD: >60 ML/MIN/{1.73_M2}
GLUCOSE BLD-MCNC: 93 MG/DL (ref 74–109)
GLUCOSE URINE: NEGATIVE MG/DL
HCT VFR BLD CALC: 37 % (ref 37–47)
HEMOGLOBIN: 11.4 G/DL (ref 12–16)
IMMATURE GRANULOCYTES #: 0 K/UL
KETONES, URINE: NEGATIVE MG/DL
LEUKOCYTE ESTERASE, URINE: NEGATIVE
LV EF: 35 %
LVEF MODALITY: NORMAL
LYMPHOCYTES ABSOLUTE: 1 K/UL (ref 1.1–4.5)
LYMPHOCYTES RELATIVE PERCENT: 9.7 % (ref 20–40)
MAGNESIUM: 1.8 MG/DL (ref 1.7–2.3)
MCH RBC QN AUTO: 30.7 PG (ref 27–31)
MCHC RBC AUTO-ENTMCNC: 30.8 G/DL (ref 33–37)
MCV RBC AUTO: 99.7 FL (ref 81–99)
MONOCYTES ABSOLUTE: 0.5 K/UL (ref 0–0.9)
MONOCYTES RELATIVE PERCENT: 4.9 % (ref 0–10)
NEUTROPHILS ABSOLUTE: 8.6 K/UL (ref 1.5–7.5)
NEUTROPHILS RELATIVE PERCENT: 82.7 % (ref 50–65)
NITRITE, URINE: NEGATIVE
PDW BLD-RTO: 14.5 % (ref 11.5–14.5)
PH UA: 6 (ref 5–8)
PLATELET # BLD: 127 K/UL (ref 130–400)
PMV BLD AUTO: 10.3 FL (ref 9.4–12.3)
POTASSIUM SERPL-SCNC: 3.9 MMOL/L (ref 3.5–5)
PROTEIN UA: NEGATIVE MG/DL
RBC # BLD: 3.71 M/UL (ref 4.2–5.4)
SODIUM BLD-SCNC: 142 MMOL/L (ref 136–145)
SPECIFIC GRAVITY UA: 1.01 (ref 1–1.03)
TROPONIN: <0.01 NG/ML (ref 0–0.03)
UROBILINOGEN, URINE: 0.2 E.U./DL
WBC # BLD: 10.4 K/UL (ref 4.8–10.8)

## 2023-01-01 PROCEDURE — 81003 URINALYSIS AUTO W/O SCOPE: CPT

## 2023-01-01 PROCEDURE — 6360000004 HC RX CONTRAST MEDICATION: Performed by: HOSPITALIST

## 2023-01-01 PROCEDURE — 94760 N-INVAS EAR/PLS OXIMETRY 1: CPT

## 2023-01-01 PROCEDURE — 2140000000 HC CCU INTERMEDIATE R&B

## 2023-01-01 PROCEDURE — 85025 COMPLETE CBC W/AUTO DIFF WBC: CPT

## 2023-01-01 PROCEDURE — 99221 1ST HOSP IP/OBS SF/LOW 40: CPT | Performed by: INTERNAL MEDICINE

## 2023-01-01 PROCEDURE — 80048 BASIC METABOLIC PNL TOTAL CA: CPT

## 2023-01-01 PROCEDURE — 6360000002 HC RX W HCPCS: Performed by: HOSPITALIST

## 2023-01-01 PROCEDURE — 84484 ASSAY OF TROPONIN QUANT: CPT

## 2023-01-01 PROCEDURE — 94761 N-INVAS EAR/PLS OXIMETRY MLT: CPT

## 2023-01-01 PROCEDURE — 2700000000 HC OXYGEN THERAPY PER DAY

## 2023-01-01 PROCEDURE — C8929 TTE W OR WO FOL WCON,DOPPLER: HCPCS

## 2023-01-01 PROCEDURE — 6370000000 HC RX 637 (ALT 250 FOR IP): Performed by: HOSPITALIST

## 2023-01-01 PROCEDURE — 2580000003 HC RX 258: Performed by: HOSPITALIST

## 2023-01-01 PROCEDURE — 36415 COLL VENOUS BLD VENIPUNCTURE: CPT

## 2023-01-01 PROCEDURE — 94640 AIRWAY INHALATION TREATMENT: CPT

## 2023-01-01 PROCEDURE — 83735 ASSAY OF MAGNESIUM: CPT

## 2023-01-01 RX ORDER — IPRATROPIUM BROMIDE AND ALBUTEROL SULFATE 2.5; .5 MG/3ML; MG/3ML
1 SOLUTION RESPIRATORY (INHALATION)
Status: COMPLETED | OUTPATIENT
Start: 2023-01-01 | End: 2023-01-01

## 2023-01-01 RX ORDER — HYDROCODONE BITARTRATE AND ACETAMINOPHEN 5; 325 MG/1; MG/1
1 TABLET ORAL ONCE
Status: COMPLETED | OUTPATIENT
Start: 2023-01-01 | End: 2023-01-01

## 2023-01-01 RX ORDER — GUAIFENESIN 600 MG/1
600 TABLET, EXTENDED RELEASE ORAL 2 TIMES DAILY PRN
Status: DISCONTINUED | OUTPATIENT
Start: 2023-01-01 | End: 2023-01-04

## 2023-01-01 RX ADMIN — CARVEDILOL 3.12 MG: 3.12 TABLET, FILM COATED ORAL at 09:14

## 2023-01-01 RX ADMIN — ASPIRIN 81 MG: 81 TABLET, CHEWABLE ORAL at 09:14

## 2023-01-01 RX ADMIN — ACETAMINOPHEN 650 MG: 325 TABLET ORAL at 16:50

## 2023-01-01 RX ADMIN — ATORVASTATIN CALCIUM 40 MG: 40 TABLET, FILM COATED ORAL at 20:21

## 2023-01-01 RX ADMIN — LISINOPRIL 5 MG: 10 TABLET ORAL at 09:14

## 2023-01-01 RX ADMIN — IPRATROPIUM BROMIDE AND ALBUTEROL SULFATE 1 AMPULE: .5; 3 SOLUTION RESPIRATORY (INHALATION) at 01:15

## 2023-01-01 RX ADMIN — Medication 5 MG: at 20:21

## 2023-01-01 RX ADMIN — CARVEDILOL 3.12 MG: 3.12 TABLET, FILM COATED ORAL at 16:50

## 2023-01-01 RX ADMIN — FUROSEMIDE 5 MG/HR: 10 INJECTION, SOLUTION INTRAMUSCULAR; INTRAVENOUS at 18:14

## 2023-01-01 RX ADMIN — PERFLUTREN 1.5 ML: 6.52 INJECTION, SUSPENSION INTRAVENOUS at 10:33

## 2023-01-01 RX ADMIN — HYDROCODONE BITARTRATE AND ACETAMINOPHEN 1 TABLET: 5; 325 TABLET ORAL at 09:16

## 2023-01-01 RX ADMIN — ENOXAPARIN SODIUM 40 MG: 100 INJECTION SUBCUTANEOUS at 20:21

## 2023-01-01 ASSESSMENT — ENCOUNTER SYMPTOMS
COUGH: 1
WHEEZING: 1
SHORTNESS OF BREATH: 1
NAUSEA: 0
VOMITING: 0
CHOKING: 0

## 2023-01-01 ASSESSMENT — PAIN DESCRIPTION - LOCATION: LOCATION: GENERALIZED

## 2023-01-01 ASSESSMENT — PAIN SCALES - GENERAL: PAINLEVEL_OUTOF10: 7

## 2023-01-01 NOTE — H&P
[facility] History and Physical    Patient Information:  Patient: Lizette Jarrett  MRN: [id number]   Acct: [de-identified]  YOB: 1930  Admit Date: 12/31/2022      Primary Care Physician: Jerardo Carcamo  Advance Directive: Full Code  Health Care Proxy: either Sigifredo Reina of [facility] or Pedro Moreno of [facility] area        SUBJECTIVE:    Chief Complaint   Patient presents with    Shortness of Breath     Patient brought in by EMS with complaints of SOA. Patient was given 0.4mg of Nitro and lasix 40mg prior to arrival      EP Sign Out:  CHF Exacerbation  Swollen legs \"like tree trunks\" and dyspnea  Stated \"she stopped taking her lasix because she did not like how it made her feel\"    HPI:  Mrs. Lizette Jarrett is a pleasant 80year old  american lady from home. She confirms that she has stopped taking her lasix at home as she didn't feel good after taking it. She had greatly swollen legs that have remained swollen despite aggressive IV Diuresis. She stated dyspnea to EMS who gave her lasix and as per the ED team she had great success with EMS as she had begun to rapidly diurese with them while en route. She was counseled about medication compliance  but does not seem to understand. Review of Systems:   Review of Systems   Constitutional:  Positive for fever. HENT:  Negative for sneezing. Respiratory:  Positive for cough, shortness of breath and wheezing. Negative for choking. Cardiovascular:  Positive for leg swelling. Negative for chest pain and palpitations. Gastrointestinal:  Negative for nausea and vomiting. Neurological:  Positive for weakness. Negative for dizziness, light-headedness and headaches. Psychiatric/Behavioral:  Negative for confusion.       Past Medical History:   Diagnosis Date    CAD (coronary artery disease) 6/12/2013    Cardiomyopathy ([facility] 6/12/2013    EF 25%    CHF (congestive heart failure) ([facility]     Hyperlipemia     Dr. Melissa Treviño manages Status post coronary artery stent placement 6/12/2013    LAD     Past Psychiatric History:  Denies any    Past Surgical History:   Procedure Laterality Date    APPENDECTOMY      BACK SURGERY      CARDIAC CATHETERIZATION  4/8/2013   [facility]    EF 20-25%    TONSILLECTOMY       Social History       Tobacco History       Smoking Status  Never      Smokeless Tobacco Use  Never              Alcohol History       Alcohol Use Status   Not Currently  \"Maybe a few drinks years ago but I don't drink anymore\"              Drug Use       Drug Use Status  No              Sexual Activity       Sexually Active  Not Asked             CODE STATUS: DNR  HEALTH CARE PROXY: either Mateo Mike of [facility] or Jaguar Hidalgo of [facility] [place] area  AMBULATES: uses a walker  DOMICILED: lives alone     Family History   Problem Relation Age of Onset    Cancer Sister      Allergies   Allergen Reactions    Codeine      Sick to her stomach    Cortizone      Sick to her stomach    Penicillins      Sick to her stomach    Sulfa Antibiotics      Sick to her stomach     Home Medications:  Prior to Admission medications    Medication Sig Start Date End Date Taking? Authorizing Provider   carvedilol (COREG) 3.125 MG tablet TAKE 1 TABLET BY MOUTH 2 TIMES DAILY (WITH MEALS) 1/19/18   IZA Bone   lisinopril (PRINIVIL;ZESTRIL) 5 MG tablet Take 1 tablet by mouth daily 4/2/16   Ta Carlisle MD   pravastatin (PRAVACHOL) 40 MG tablet Take 40 mg by mouth daily. Historical Provider, MD   HYDROcodone-acetaminophen (NORCO) 5-325 MG per tablet Take 1 tablet by mouth every 6 hours as needed for Pain. Historical Provider, MD   furosemide (LASIX) 40 MG tablet Take 40 mg by mouth daily    Historical Provider, MD   aspirin 81 MG tablet Take 81 mg by mouth daily.     Historical Provider, MD         OBJECTIVE:    Vitals:    01/01/23 0739   BP:    Pulse:    Resp:    Temp:    SpO2: 97%   Breathing on nasal cannula    BP (!) 113/53   Pulse 73   Temp 97.9 °F (36.6 °C) (Temporal)   Resp 26   Ht 5' (1.524 m)   Wt 120 lb 11.2 oz (54.7 kg)   SpO2 97%   BMI 23.57 kg/m²     No intake or output data in the 24 hours ending 01/01/23 0842    Physical Exam  Vitals reviewed. Constitutional:       General: She is not in acute distress. Appearance: Normal appearance. She is normal weight. She is ill-appearing. She is not toxic-appearing. HENT:      Head: Normocephalic and atraumatic. Nose: No congestion or rhinorrhea. Eyes:      General:         Right eye: No discharge. Left eye: No discharge. Neck:      Comments: Trachea appears midline, supple  Cardiovascular:      Rate and Rhythm: Normal rate and regular rhythm. Heart sounds: No murmur heard. No friction rub. No gallop. Pulmonary:      Effort: Pulmonary effort is normal. No respiratory distress. Breath sounds: No stridor. No wheezing, rhonchi or rales. Chest:      Chest wall: No tenderness. Abdominal:      General: Bowel sounds are normal.      Tenderness: There is no abdominal tenderness. There is no guarding or rebound. Musculoskeletal:         General: No tenderness. Right lower leg: Edema present. Left lower leg: Edema present. Comments: Unstageable left heel pressure ulcer,    Skin:     General: Skin is warm. Comments: nondiaphoretic   Neurological:      Mental Status: She is alert. Motor: No tremor or seizure activity.    Psychiatric:         Mood and Affect: Mood normal.         Behavior: Behavior normal.        LABORATORY DATA:    CBC:   Recent Labs     12/31/22 1755 01/01/23  0128   WBC 11.1* 10.4   HGB 12.4 11.4*   HCT 39.7 37.0    127*     BMP:   Recent Labs     12/31/22  1755 12/31/22  1849 01/01/23  0128     --  142   K 4.1 3.7 3.9     --  102   CO2 26  --  27   BUN 14  --  15   CREATININE 0.8  --  0.8   CALCIUM 9.2  --  8.6   PHOS 3.7  --   --      Hepatic Profile:   Recent Labs     12/31/22 1755   AST 14   ALT <5* BILITOT 1.1   ALKPHOS 75     Coag Panel:   Recent Labs     12/31/22  1755   INR 1.12   PROTIME 14.4   APTT 26.8     Cardiac Enzymes:   Recent Labs     12/31/22  1755 12/31/22  2210 01/01/23  0128   TROPONINI <0.01 <0.01 <0.01  <0.01     Pro-BNP:   Recent Labs     12/31/22  1755   PROBNP 6,110*     A1C:   Recent Labs     12/31/22  1755   LABA1C 4.8     TSH: Invalid input(s): LABTS    ABG:   Recent Labs     12/31/22  1849   PHART 7.300*   KIS7JIZ 61.0*   PO2ART 112.0*   YCO2HLU 30.0*   BEART 2.1*   HGBAE 12.9   T3TNVIGF 95.4   CARBOXHGBART 3.0     Urinalysis:   Lab Results   Component Value Date/Time    NITRU Negative 01/01/2023 01:39 AM    WBCUA 1 02/08/2022 04:35 PM    BACTERIA NEGATIVE 02/08/2022 04:35 PM    RBCUA 4 02/08/2022 04:35 PM    BLOODU Negative 01/01/2023 01:39 AM    SPECGRAV 1.008 01/01/2023 01:39 AM    GLUCOSEU Negative 01/01/2023 01:39 AM     EKG:   \"Normal sinus rhythm rate 73 and left bundle branch block similar to prior\" as per EP's verbal report (text copied from their note)    IMAGING:  XR CHEST PORTABLE  Result Date: 1/1/2023  1. Moderate pulmonary emphysema. 2.Cardiomegaly with mild pulmonary vascular changes. 3.Soft tissue density in the left paratracheal region with tracheal deviation to the right may represent lymphadenopathy or activity thoracic aorta. Further evaluation with CT thorax can be considered if clinically indicated.         ASESSMENTS & PLANS:    CHF in Exacerbation due to Medication Noncompliance (stopped lasix): will need ACS R/O as well  Tele  Admit to cardiac metzger as inpatient status patient  Cardio consult in AM  Trend TnI  Mag, Phos, TSH, Lipid Panel, HbA1c - will run as add ons to ED labs if able  CBC and BMP with Reflex for following AM  ASA as per protocol (324-325mg chewed STAT unless on 81ASA daily in which case 162mg chewed STAT if not yet given, THEN from following AM 81mg Daily.)  Statin as per protocol (High intensity Statin, if already on high intensity Stain of Simvasttain 80 continue it, if Lipitor 40+ then Lipitor 80 (if less than 40 just double so long as >=40), if Rosuvastatin 20mg+ then Rosuvastatin 40mg PO QHS (if less than 20 just double so long as >=20mg)  Carvedilol 3.125mg PO BID to be continued with holding parameters  NG SL PRN CP  TTE in AM  Strict Is and Os  Daily Weights  Continue home lisinopril 5mg PO QdDay with holding parameters  Lasix infusion of 5mg per hour in place of home oral dose of 40mg PO QDay  Strict Is and Os  Daily weights  VS Q4h    seems to not understand risks of skipping her diuretic   Cog eval   CM consult for help with placement needs vs d/c  PT & OT (after at least 3 negative Tn may order)    Chronc medical problems:  Continue home regimen as indicated  RN team to call and verify narcotics with pharmacy  [facility] orders PRN patient request until then  Narcan PRN for safety    Supportive and Prophylactic Txx:  DVT PPx: Lovenox SQ  GI (PUD) PPx: not indicated  PT: contraindicated as still getting an ACS R/O  ADULT DIET; Regular; No Added Salt (3-4 gm)  guaiFENesin, naloxone, nitroGLYCERIN, sodium chloride flush, sodium chloride, ondansetron **OR** ondansetron, acetaminophen **OR** acetaminophen, polyethylene glycol, melatonin, calcium carbonate, perflutren lipid microspheres      Care time of >45 minutes  Pt seen/examined and admitted PO to inpatient status. Inpatient status is used for patients with an expected LOS extending past two midnights due to medical therapy and or critical care needs, otherwise patients are placed to OBServation status. Signed:  Electronically signed by Hayden Knott MD on 1/1/23 at 9:12 AM CST.

## 2023-01-01 NOTE — CONSULTS
Cardiology Consultation      I personally saw the patient and rounded with:  RN, on  1/1/23      The observations documented in this note, including the assessment and plan are mine          Date of Admission:  12/31/2022  5:50 PM    Date of Initially Being Seen / Consultation:  1/1/23    Cardiologist:  Heath Hernandez 53 Attending: Dr. Kevin Arriola     PCP:  Kali Stuart    Reason for Consultation or Admission / Chief Complaint:  sob and leg swelling    SUBJECTIVE AND HISTORY OF PRESENT ILLNESS:    Source of the history:  Patient, family, previous inpatient and outpatient records in [facility]. Eloina Schmidt is a 80 y.o. female who presents to [facility] with chief complaint of leg swelling and sob due to medication noncompliance consulted to the cardiology team for further work-up.  + sob. Denies any chest pain. Patient denies any diaphoresis. Denies any palpitation. + leg swelling. Denies any focal weakness or numbness. Denies any hematemesis or melena or easy bruising or bleeding. Cardiovascular review of system is otherwise negative.       Family present:      CARDIAC RISK PROFILE:    Risk Factor Yes / No / Unknown       Gender Female   Cigarette Use No   Family History of Cardiovascular Disease No   Diabetes Mellitus no   Hypercholesteremia yes   Hypertension yes          Cardiac Specific Problems:    Specialty Problems          Cardiology Problems    PAD (peripheral artery disease) ([facility])        * (Principal) Systolic and diastolic CHF, acute on chronic (HCC)        Ischemic dilated cardiomyopathy (HCC)        Mixed hyperlipidemia        AAA (abdominal aortic aneurysm)        Chronic systolic congestive heart failure (HCC)        Descending thoracic aortic aneurysm        Hyperlipemia        Coronary artery disease involving native heart without angina pectoris        Hyperlipidemia             PRIOR CARDIAC PROBLEM LIST  (IF APPLICABLE):    CHF  CAD      Past Medical History:    Past Medical History:   Diagnosis Date    CAD (coronary artery disease) 6/12/2013    Cardiomyopathy ([facility] [address] 6/12/2013    EF 25%    CHF (congestive heart failure) ([facility]     Hyperlipemia     Dr. Raphael Sherman manages    Status post coronary artery stent placement 6/12/2013    LAD         Past Surgical History:    Past Surgical History:   Procedure Laterality Date    APPENDECTOMY      BACK SURGERY      CARDIAC CATHETERIZATION  4/8/2013   [facility]    EF 20-25%    TONSILLECTOMY           Home Medications:   Prior to Admission medications    Medication Sig Start Date End Date Taking? Authorizing Provider   carvedilol (COREG) 3.125 MG tablet TAKE 1 TABLET BY MOUTH 2 TIMES DAILY (WITH MEALS) 1/19/18   IAZ Velasquez   lisinopril (PRINIVIL;ZESTRIL) 5 MG tablet Take 1 tablet by mouth daily 4/2/16   Adeola Fernández MD   pravastatin (PRAVACHOL) 40 MG tablet Take 40 mg by mouth daily. Historical Provider, MD   HYDROcodone-acetaminophen (NORCO) 5-325 MG per tablet Take 1 tablet by mouth every 6 hours as needed for Pain. Historical Provider, MD   furosemide (LASIX) 40 MG tablet Take 40 mg by mouth daily    Historical Provider, MD   aspirin 81 MG tablet Take 81 mg by mouth daily. Historical Provider, MD        Facility Administered Medications:    aspirin  81 mg Oral Daily    carvedilol  3.125 mg Oral BID WC    atorvastatin  40 mg Oral Nightly    lisinopril  5 mg Oral Daily    sodium chloride flush  5-40 mL IntraVENous 2 times per day    enoxaparin  40 mg SubCUTAneous Daily       Allergies:  Codeine, Cortizone, Penicillins, and Sulfa antibiotics     Social History:       Social History     Socioeconomic History    Marital status:       Spouse name: Not on file    Number of children: 1    Years of education: Not on file    Highest education level: Not on file   Occupational History    Occupation: worked in a factory   Tobacco Use    Smoking status: Never    Smokeless tobacco: Never   Vaping Use    Vaping Use: Never used   Substance and Sexual Activity    Alcohol use: Not Currently     Comment: \"Maybe a few drinks years ago but I don't drink anymore\"    Drug use: No    Sexual activity: Not on file     Comment: had a son   Other Topics Concern    Not on file   Social History Narrative    CODE STATUS: DNR    HEALTH CARE PROXY: either [place] Enri[place] of [address] or [place] of [address] area    AMBULATES: uses a walker    DOMICILED: lives alone     Social Determinants of Health     Financial Resource Strain: Not on [employer] Insecurity: Not on file   Transportation Needs: Not on file   Physical Activity: Not on file   Stress: Not on file   Social Connections: Not on file   Intimate Partner Violence: Not on file   Housing Stability: Not on file       Family History:     Family History   Problem Relation Age of Onset    Cancer Sister          REVIEW OF SYSTEMS:     Constitutional: Negative. Eyes: Negative. Respiratory: Negative. Cardiac:   Gastrointestinal: Negative. Genitourinary: Negative. Musculoskeletal: Negative. Skin: Negative. Neurological:  negative  Hematological: Negative. Psychiatric/Behavioral: Negative. PHYSICAL EXAMINATION:     BP (!) 114/52   Pulse 73   Temp 97.7 °F (36.5 °C) (Temporal)   Resp 18   Ht 5' (1.524 m)   Wt 120 lb 11.2 oz (54.7 kg)   SpO2 97%   BMI 23.57 kg/m²     Vitals and nursing note reviewed. Constitutional:       Appearance:  well-developed. HENT:      Head: Normocephalic and atraumatic. Eyes:      General:         Right eye: No discharge. Left eye: No discharge. Conjunctiva/sclera: Conjunctivae normal.      Pupils: Pupils are equal, round, and reactive to light. Cardiovascular:      Rate and Rhythm: Normal rate and regular rhythm. Heart sounds: No significant murmur. Pulmonary:      Effort: Pulmonary effort is normal. No respiratory distress. Breath sounds: Normal breath sounds. No wheezing or rales.    Abdominal:      General: Bowel sounds are normal.      Palpations: Abdomen is soft. There is no mass. Tenderness: There is no abdominal tenderness. There is no guarding or rebound. Musculoskeletal:         General: No tenderness. Normal range of motion. Cervical back: Normal range of motion and neck supple. Skin:     General: Skin is warm and dry. Capillary Refill: Capillary refill takes less than 2 seconds. Neurological:      Mental Status:  alert and oriented to person, place, and time. Psychiatric:         Behavior: Behavior normal.         Thought Content:  Thought content normal.         Judgment: Judgment normal.     LABORATORY EVALUATION & TESTING:    I have personally reviewed and interpreted the results of the following diagnostic testing      EKG and or Telemetry:  which was personally reviewed me:      Troponin: Troponins are . the creatinine is     CBC:   Recent Labs     12/31/22  1755 01/01/23  0128   WBC 11.1* 10.4   HGB 12.4 11.4*   HCT 39.7 37.0   MCV 99.0 99.7*    127*     BMP:   Recent Labs     12/31/22  1755 12/31/22  1849 01/01/23  0128     --  142   K 4.1 3.7 3.9     --  102   CO2 26  --  27   PHOS 3.7  --   --    BUN 14  --  15   CREATININE 0.8  --  0.8     Cardiac Enzymes:   Recent Labs     12/31/22  2210 01/01/23  0128 01/01/23  1117   TROPONINI <0.01 <0.01  <0.01 <0.01     PT/INR:   Recent Labs     12/31/22  1755   PROTIME 14.4   INR 1.12     APTT:   Recent Labs     12/31/22  1755   APTT 26.8     Liver Profile:  Lab Results   Component Value Date/Time    AST 14 12/31/2022 05:55 PM    ALT <5 12/31/2022 05:55 PM    BILITOT 1.1 12/31/2022 05:55 PM    ALKPHOS 75 12/31/2022 05:55 PM     Lab Results   Component Value Date/Time    CHOL 121 12/31/2022 05:55 PM    HDL 60 12/31/2022 05:55 PM    TRIG 99 12/31/2022 05:55 PM     TSH:  No results found for: TSH  UA:   Lab Results   Component Value Date/Time    COLORU YELLOW 01/01/2023 01:39 AM    PHUR 6.0 01/01/2023 01:39 AM    WBCUA 1 02/08/2022 04:35 PM    RBCUA 4 02/08/2022 04:35 PM    MUCUS 3+ 04/05/2013 05:50 PM    YEAST 2+ 04/05/2013 05:50 PM    BACTERIA NEGATIVE 02/08/2022 04:35 PM    CLARITYU Clear 01/01/2023 01:39 AM    SPECGRAV 1.008 01/01/2023 01:39 AM    LEUKOCYTESUR Negative 01/01/2023 01:39 AM    UROBILINOGEN 0.2 01/01/2023 01:39 AM    BILIRUBINUR Negative 01/01/2023 01:39 AM    BLOODU Negative 01/01/2023 01:39 AM    GLUCOSEU Negative 01/01/2023 01:39 AM     TTE:   Normal left ventricular size with severely reduced LV function and an  estimated ejection fraction of approximately 35%. Mild concentric left ventricular hypertrophy. Unable to accurately assess diastolic function due to mitral stenosis. Ballooned out, akinetic apex with all other wall segments being hypokinetic. No evidence of left ventricular mass or thrombus noted. Mild to Moderate mitral valve stenosis (mean pressure gradient of 7 mmHg),  trace mitral regurgitation. Moderately thickened and calcified aortic valve with moderately reduced  leaflet mobility. Individual aortic valve leaflets are not clearly visualized, appears to be   tricuspid though. Moderate aortic stenosis; no evidence of significant aortic regurgitation. The aortic valve area is 1.05 cm2 with a maximum gradient of 52 mmHg and a   mean gradient of 28 mmHg. Right ventricular systolic pressure of 36 mm Hg consistent with mild   pulmonary hypertension. Moderate to severely dilated left atrium. Floppy intra-atrial septum. Normal intact intra-atrial septum was noted with no evidence of   significant intra-atrial communications by color flow Doppler or by   agitated saline study.         Assessment:  Shortness of breath  H/O CAD S/P stent in LAD  Acute on chronic combined systolic and diastolic heart failure with LVEF of 35%  Moderate AS  Mild to moderate MS  Mild PHTN  Hypertension  Hyperlipidemia  Macrocytic Anemia      Plan:  NEG troponin  Pro BNP elevated  Continue to monitor on telemetry  Echocardiogram suggestive ofEF of 35% with moderate aortic stenosis and mild to moderate mitral stenosis. Continue aspirin, statin, BB  Continue lasix, monitor BMP  Patient may benefit from LifeVest. Discussed the risk and benefits. Agreed to try it. Patient is DNR    I had a detailed discussion with the patient and / or family regarding the historical points, physical examination findings, and any diagnostic results supporting the admission diagnosis. The patient was educated on care and need for admission. questions were invited and answered. Patient and / or family shows understanding of admission information and agrees to follow. I have discussed the risks, benefits and options with the patient and her family. They appear to understand, have no questions, and wish to proceed. Discussed with patient and family and nursing. I greatly appreciate the opportunity and your confidence in allowing me to participate in the care of True Bradford    Electronically signed by Rashard Harry DO on 1/1/23   Interventional cardiologist, [facility].

## 2023-01-01 NOTE — ED PROVIDER NOTES
[facility]  eMERGENCY dEPARTMENT eNCOUnter      Pt Name: Argenis Stone  MRN: [id number]  [place] 5/29/1930  Date of evaluation: 12/31/2022  Provider: Ant Tineo MD    CHIEF COMPLAINT       Chief Complaint   Patient presents with    Shortness of Breath     Patient brought in by EMS with complaints of SOA. Patient was given 0.4mg of Nitro and lasix 40mg prior to arrival          HISTORY OF PRESENT ILLNESS   (Location/Symptom, Timing/Onset,Context/Setting, Quality, Duration, Modifying Factors, Severity)  Note limiting factors. Argenis Stone is a 80 y.o. female who presents to the emergency department with shortness of breath. Patient states that yesterday she woke up and noticed that she was wheezing. She says that her shortness of breath worsened today and she has had a cough. She has not been taking her water pill for the last 2 days or so because she does not like how it makes her feel. She does not wear home oxygen all the time. She wears it as needed. She has a history of CHF. GIven 40mg lasix by EMS. Sat in 80s on RA per RN. HPI    NursingNotes were reviewed. REVIEW OF SYSTEMS    (2-9 systems for level 4, 10 or more for level 5)     Review of Systems   Constitutional:  Negative for chills and fever. HENT:  Negative for rhinorrhea and sore throat. Respiratory:  Positive for cough and shortness of breath. Cardiovascular:  Positive for leg swelling. Negative for chest pain. Gastrointestinal:  Negative for abdominal pain, diarrhea, nausea and vomiting. Genitourinary:  Negative for difficulty urinating. Musculoskeletal:  Negative for back pain and neck pain. Skin:  Negative for rash. Neurological:  Negative for weakness and headaches. Psychiatric/Behavioral:  Negative for confusion. A complete review of systems was performed and is negative except as noted above in the HPI.        PAST MEDICAL HISTORY     Past Medical History:   Diagnosis Date    CAD (coronary artery disease) 6/12/2013    Cardiomyopathy ([facility] [address] [address] 6/12/2013    EF 25%    CHF (congestive heart failure) (HCC)     Hyperlipemia     Dr. Yari Macario manages    Status post coronary artery stent placement 6/12/2013    LAD         SURGICAL HISTORY       Past Surgical History:   Procedure Laterality Date    APPENDECTOMY      BACK SURGERY      CARDIAC CATHETERIZATION  4/8/2013   [facility]    EF 20-25%    TONSILLECTOMY           CURRENT MEDICATIONS       Current Discharge Medication List        CONTINUE these medications which have NOT CHANGED    Details   carvedilol (COREG) 3.125 MG tablet TAKE 1 TABLET BY MOUTH 2 TIMES DAILY (WITH MEALS)  Qty: 60 tablet, Refills: 0    Associated Diagnoses: Essential hypertension      lisinopril (PRINIVIL;ZESTRIL) 5 MG tablet Take 1 tablet by mouth daily  Qty: 30 tablet, Refills: 3      pravastatin (PRAVACHOL) 40 MG tablet Take 40 mg by mouth daily. HYDROcodone-acetaminophen (NORCO) 5-325 MG per tablet Take 1 tablet by mouth every 6 hours as needed for Pain. furosemide (LASIX) 40 MG tablet Take 40 mg by mouth daily      aspirin 81 MG tablet Take 81 mg by mouth daily. ALLERGIES     Codeine, Cortizone, Penicillins, and Sulfa antibiotics    FAMILY HISTORY       Family History   Problem Relation Age of Onset    Cancer Sister           SOCIAL HISTORY       Social History     Socioeconomic History    Marital status:     Tobacco Use    Smoking status: Never    Smokeless tobacco: Never   Substance and Sexual Activity    Alcohol use: No    Drug use: No       SCREENINGS    Yellow Jacket Coma Scale  Eye Opening: Spontaneous  Best Verbal Response: Oriented  Best Motor Response: Obeys commands  Eddie Coma Scale Score: 15        PHYSICAL EXAM    (up to 7 for level 4, 8 or more for level 5)     ED Triage Vitals [12/31/22 1750]   BP Temp Temp src Heart Rate Resp SpO2 Height Weight   (!) 191/98 97.7 °F (36.5 °C) -- 74 18 93 % 5' (1.524 m) 130 lb (59 kg)       Physical Exam  Vitals and nursing note reviewed. Constitutional:       General: She is not in acute distress. Appearance: She is well-developed. She is not diaphoretic. HENT:      Head: Normocephalic and atraumatic. Eyes:      Pupils: Pupils are equal, round, and reactive to light. Neck:      Vascular: JVD present. Cardiovascular:      Rate and Rhythm: Normal rate and regular rhythm. Heart sounds: Normal heart sounds. Pulmonary:      Effort: Pulmonary effort is normal. No respiratory distress. Breath sounds: Decreased breath sounds present. Abdominal:      General: Bowel sounds are normal. There is no distension. Palpations: Abdomen is soft. Tenderness: There is no abdominal tenderness. Musculoskeletal:         General: Normal range of motion. Cervical back: Normal range of motion and neck supple. Right lower leg: Edema present. Left lower leg: Edema present. Skin:     General: Skin is warm and dry. Findings: No rash. Neurological:      Mental Status: She is alert and oriented to person, place, and time. Cranial Nerves: No cranial nerve deficit. Motor: No abnormal muscle tone. Coordination: Coordination normal.   Psychiatric:         Behavior: Behavior normal.       DIAGNOSTIC RESULTS     EKG: All EKG's are interpreted by the Emergency Department Physician who either signs or Co-signs this chart in the absence of a cardiologist.    Normal sinus rhythm rate 73 and left bundle branch block similar to prior    RADIOLOGY:   Non-plain film images such as CT, Ultrasound and MRI are read by the radiologist. Plainradiographic images are visualized and preliminarily interpreted by the emergency physician with the below findings:        Interpretation per the Radiologist below, if available at the time of this note:    XR CHEST PORTABLE   Final Result   1. Moderate pulmonary emphysema. 2.Cardiomegaly with mild pulmonary vascular changes.    3.Soft tissue density in the left paratracheal region with tracheal deviation   to the right may represent lymphadenopathy or activity thoracic aorta. Further   evaluation with CT thorax can be considered if clinically indicated. ED BEDSIDE ULTRASOUND:   Performed by ED Physician - none    LABS:  Labs Reviewed   BLOOD GAS, ARTERIAL - Abnormal; Notable for the following components:       Result Value    pH, Arterial 7.300 (*)     pCO2, Arterial 61.0 (*)     pO2, Arterial 112.0 (*)     HCO3, Arterial 30.0 (*)     Base Excess, Arterial 2.1 (*)     All other components within normal limits   BRAIN NATRIURETIC PEPTIDE - Abnormal; Notable for the following components:    Pro-BNP 6,110 (*)     All other components within normal limits   CBC WITH AUTO DIFFERENTIAL - Abnormal; Notable for the following components:    WBC 11.1 (*)     RBC 4.01 (*)     MCHC 31.2 (*)     RDW 14.6 (*)     Neutrophils % 78.2 (*)     Lymphocytes % 12.6 (*)     Neutrophils Absolute 8.7 (*)     All other components within normal limits   COMPREHENSIVE METABOLIC PANEL - Abnormal; Notable for the following components:    Glucose 119 (*)     Total Protein 6.1 (*)     ALT <5 (*)     All other components within normal limits   LACTIC ACID - Abnormal; Notable for the following components:    Lactic Acid 2.0 (*)     All other components within normal limits    Narrative:     CALL  Basilio  K[facility] tel. ,  Chemistry results called to and read back by Deidre/RN/ER, 12/31/2022 18:59, by  [address] [place] [address] Dr CASTANEDA - Abnormal; Notable for the following components:    Cholesterol, Total 121 (*)     HDL 60 (*)     All other components within normal limits   COVID-19, RAPID   CULTURE, BLOOD 2   CULTURE, BLOOD 1   APTT   PROTIME-INR   TROPONIN   TSH WITH REFLEX TO FT4   HEMOGLOBIN A1C   MAGNESIUM   PHOSPHORUS   TROPONIN   TROPONIN   TROPONIN   BASIC METABOLIC PANEL   MAGNESIUM   CBC WITH AUTO DIFFERENTIAL       All other labs were within normal range or not returned as of this dictation.     EMERGENCY DEPARTMENT COURSE and DIFFERENTIALDIAGNOSIS/MDM:   Vitals:    Vitals:    12/31/22 1830 12/31/22 1850 12/31/22 2000 12/31/22 2200   BP: (!) 185/91  (!) 164/77 (!) 133/98   Pulse: 82 78 76 86   Resp: 20 25 21 24   Temp:    97.4 °F (36.3 °C)   TempSrc:    Temporal   SpO2: 94% 98% 100% 96%   Weight:    120 lb 11.2 oz (54.7 kg)   Height:           MDM    D/w Dr Boni Collet for admission for CHF, pulmonary edema with medication noncompliance    CONSULTS:  IP CONSULT TO HEART FAILURE NURSE/COORDINATOR  IP CONSULT TO DIETITIAN  IP CONSULT TO CARDIOLOGY    PROCEDURES:  Unless otherwise notedbelow, none     Procedures    FINAL IMPRESSION     1.  Congestive heart failure, unspecified HF chronicity, unspecified heart failure type [facility])          DISPOSITION/PLAN   DISPOSITION Admitted 12/31/2022 07:58:50 PM      PATIENT REFERRED TO:  @FUP@    DISCHARGE MEDICATIONS:  Current Discharge Medication List             (Please note that portions of this note were completed with a voice recognition program.  Efforts were made to edit the dictations butoccasionally words are mis-transcribed.)    Luca Duron MD (electronically signed)  AttendingEmergency Physician         Luca Duron MD  12/31/22 [phone]

## 2023-01-01 NOTE — PROGRESS NOTES
Patient incontinent of urine purwick not working bed lens gown all wet with urine. Unable to keep accurate ourput.

## 2023-01-01 NOTE — PROGRESS NOTES
Left heel unstageable, black,necrotic. Right heel . New mepilex's. Applied matt. Heels elevated. Mepilex to coccyx, skin pink, blanchable. 4 eyes with .  karen rn

## 2023-01-01 NOTE — PROGRESS NOTES
Patient doesn't have oxygen at home. Congested cough noted patient alert and oriented from home. Expiratory wheezes noted bilaterally.

## 2023-01-01 NOTE — PROGRESS NOTES
Date:2023  Patient: Justine Archibald  : 1930  LSU:[id number]  CODE:                                                                        PCP:Rigo Persaud    Admit Date: 2022  5:50 PM   LOS: 1 day     Hospital course :Mrs. Justine Archibald is a pleasant 80year old  american lady from home. She confirms that she has stopped taking her lasix at home as she didn't feel good after taking it. She had greatly swollen legs that have remained swollen despite aggressive IV Diuresis. She stated dyspnea to EMS who gave her lasix and as per the ED team she had great success with EMS as she had begun to rapidly diurese with them while en route. She was counseled about medication compliance  but does not seem to understand. Subjective:  seen and evaluated at bedside in the presence of her daughter who is POA, she remarked that she usually avoided taking diuretics as she feels weak, discussed about the importance of diuretics and presently on IV Lasix, will monitor intake and output discussed about the medical management with daughter she asked for possible infection, she did not give any history of fever.     Review of Systems    Reviewed 12 system and found most of them negative except as stated above in subjective note    Objective:      Vital signs in last 24 hours:  Patient Vitals for the past 24 hrs:   BP Temp Temp src Pulse Resp SpO2 Height Weight   23 1119 (!) 122/53 98.2 °F (36.8 °C) Temporal 70 20 96 % -- --   23 0739 -- -- -- -- -- 97 % -- --   23 0617 (!) 113/53 97.9 °F (36.6 °C) Temporal 73 26 97 % -- --   23 0210 (!) 123/57 97.5 °F (36.4 °C) Temporal 72 20 98 % -- --   23 0115 -- -- -- -- -- 98 % -- --   22 2320 (!) 154/75 98.1 °F (36.7 °C) Temporal 91 24 97 % -- --   22 2245 -- -- -- 77 -- -- -- --   22 2200 (!) 133/98 97.4 °F (36.3 °C) Temporal 86 24 96 % -- 120 lb 11.2 oz (54.7 kg)   22 (!) 164/77 -- -- 76 21 100 % -- -- 12/31/22 1945 -- -- -- -- -- 100 % -- --   12/31/22 1850 -- -- -- 78 25 98 % -- --   12/31/22 1830 (!) 185/91 -- -- 82 20 94 % -- --   12/31/22 1750 (!) 191/98 97.7 °F (36.5 °C) -- 74 18 93 % 5' (1.524 m) 130 lb (59 kg)       Patient examined with appropriate PPE  Physical Exam:  Vital Signs: BP (!) 122/53   Pulse 70   Temp 98.2 °F (36.8 °C) (Temporal)   Resp 20   Ht 5' (1.524 m)   Wt 120 lb 11.2 oz (54.7 kg)   SpO2 96%   BMI 23.57 kg/m²   General appearance:. Lying comfortably in bed ,   HEENT: Normocephalic , Atraumatic, PERRL, JVP not raised  Chest: On inspection no use of accessory muscles of respiration, on auscultation poor air entry with expiratory rhonchi, oxygen by nasal cannula   cardiac: Regular rate and rhythm, S1, S2 normal. No murmurs, gallops, or rubs auscultated. Abdomen:soft, non-tender; normal bowel sounds, no masses, no organomegaly. Urogenital : no cain, no suprapubic tenderness, no CVA tenderness  Extremities: No clubbing or cyanosis. +2  peripheral edema over the dependent area and whole of the lower extremities. Peripheral pulses palpable.   Neurologic: Alert and Cooperative , cranial nerves grossly intact, DTR equal, Power  5/5   Psychology: No hallucination, no delusion, appropriate mood          Lab Review   Recent Results (from the past 24 hour(s))   APTT    Collection Time: 12/31/22  5:55 PM   Result Value Ref Range    aPTT 26.8 26.0 - 36.2 sec   Brain Natriuretic Peptide    Collection Time: 12/31/22  5:55 PM   Result Value Ref Range    Pro-BNP 6,110 (H) 0 - 1,800 pg/mL   CBC with Auto Differential    Collection Time: 12/31/22  5:55 PM   Result Value Ref Range    WBC 11.1 (H) 4.8 - 10.8 K/uL    RBC 4.01 (L) 4.20 - 5.40 M/uL    Hemoglobin 12.4 12.0 - 16.0 g/dL    Hematocrit 39.7 37.0 - 47.0 %    MCV 99.0 81.0 - 99.0 fL    MCH 30.9 27.0 - 31.0 pg    MCHC 31.2 (L) 33.0 - 37.0 g/dL    RDW 14.6 (H) 11.5 - 14.5 %    Platelets 324 019 - 260 K/uL    MPV 10.2 9.4 - 12.3 fL    Neutrophils % 78.2 (H) 50.0 - 65.0 %    Lymphocytes % 12.6 (L) 20.0 - 40.0 %    Monocytes % 4.7 0.0 - 10.0 %    Eosinophils % 3.6 0.0 - 5.0 %    Basophils % 0.5 0.0 - 1.0 %    Neutrophils Absolute 8.7 (H) 1.5 - 7.5 K/uL    Immature Granulocytes # 0.0 K/uL    Lymphocytes Absolute 1.4 1.1 - 4.5 K/uL    Monocytes Absolute 0.50 0.00 - 0.90 K/uL    Eosinophils Absolute 0.40 0.00 - 0.60 K/uL    Basophils Absolute 0.10 0.00 - 0.20 K/uL   Comprehensive Metabolic Panel    Collection Time: 12/31/22  5:55 PM   Result Value Ref Range    Sodium 137 136 - 145 mmol/L    Potassium 4.1 3.5 - 5.0 mmol/L    Chloride 100 98 - 111 mmol/L    CO2 26 22 - 29 mmol/L    Anion Gap 11 7 - 19 mmol/L    Glucose 119 (H) 74 - 109 mg/dL    BUN 14 8 - 23 mg/dL    Creatinine 0.8 0.5 - 0.9 mg/dL    Est, Glom Filt Rate >60 >60    Calcium 9.2 8.2 - 9.6 mg/dL    Total Protein 6.1 (L) 6.6 - 8.7 g/dL    Albumin 3.8 3.5 - 5.2 g/dL    Total Bilirubin 1.1 0.2 - 1.2 mg/dL    Alkaline Phosphatase 75 35 - 104 U/L    ALT <5 (A) 5 - 33 U/L    AST 14 5 - 32 U/L   Protime-INR    Collection Time: 12/31/22  5:55 PM   Result Value Ref Range    Protime 14.4 12.0 - 14.6 sec    INR 1.12 0.88 - 1.18   Troponin    Collection Time: 12/31/22  5:55 PM   Result Value Ref Range    Troponin <0.01 0.00 - 0.03 ng/mL   TSH with Reflex to FT4    Collection Time: 12/31/22  5:55 PM   Result Value Ref Range    TSH Reflex FT4 1.81 0.35 - 5.50 uIU/mL   Hemoglobin A1C    Collection Time: 12/31/22  5:55 PM   Result Value Ref Range    Hemoglobin A1C 4.8 4.0 - 6.0 %   Lipid Panel    Collection Time: 12/31/22  5:55 PM   Result Value Ref Range    Cholesterol, Total 121 (L) 160 - 199 mg/dL    Triglycerides 99 0 - 149 mg/dL    HDL 60 (L) 65 - 121 mg/dL    LDL Calculated 41 <100 mg/dL   Magnesium    Collection Time: 12/31/22  5:55 PM   Result Value Ref Range    Magnesium 2.0 1.7 - 2.3 mg/dL   Phosphorus    Collection Time: 12/31/22  5:55 PM   Result Value Ref Range    Phosphorus 3.7 2.5 - 4.5 mg/dL   Lactic Acid    Collection Time: 12/31/22  6:30 PM   Result Value Ref Range    Lactic Acid 2.0 (HH) 0.5 - 1.9 mmol/L   Blood Gas, Arterial    Collection Time: 12/31/22  6:49 PM   Result Value Ref Range    pH, Arterial 7.300 (L) 7.350 - 7.450    pCO2, Arterial 61.0 (H) 35.0 - 45.0 mmHg    pO2, Arterial 112.0 (H) 80.0 - 100.0 mmHg    HCO3, Arterial 30.0 (H) 22.0 - 26.0 mmol/L    Base Excess, Arterial 2.1 (H) -2.0 - 2.0 mmol/L    Hemoglobin, Art, Extended 12.9 12.0 - 16.0 g/dL    O2 Sat, Arterial 95.4 >92 %    Carboxyhgb, Arterial 3.0 0.0 - 5.0 %    Methemoglobin, Arterial 1.1 <1.5 %    O2 Content, Arterial 17.5 Not Established mL/dL    O2 Therapy Unknown     Oxygen Flow 3.0     Mode NASAL CANNULA     Spont Rate(bpm) 19     Sample Source RB     Potassium, Whole Blood 3.7    COVID-19, Rapid    Collection Time: 12/31/22  7:41 PM    Specimen: Nasopharyngeal Swab   Result Value Ref Range    SARS-CoV-2, NAAT Not Detected Not Detected   Troponin    Collection Time: 12/31/22 10:10 PM   Result Value Ref Range    Troponin <0.01 0.00 - 0.03 ng/mL   Troponin    Collection Time: 01/01/23  1:28 AM   Result Value Ref Range    Troponin <0.01 0.00 - 0.03 ng/mL   Troponin    Collection Time: 01/01/23  1:28 AM   Result Value Ref Range    Troponin <0.01 0.00 - 0.03 ng/mL   Basic Metabolic Panel    Collection Time: 01/01/23  1:28 AM   Result Value Ref Range    Sodium 142 136 - 145 mmol/L    Potassium 3.9 3.5 - 5.0 mmol/L    Chloride 102 98 - 111 mmol/L    CO2 27 22 - 29 mmol/L    Anion Gap 13 7 - 19 mmol/L    Glucose 93 74 - 109 mg/dL    BUN 15 8 - 23 mg/dL    Creatinine 0.8 0.5 - 0.9 mg/dL    Est, Glom Filt Rate >60 >60    Calcium 8.6 8.2 - 9.6 mg/dL   Magnesium    Collection Time: 01/01/23  1:28 AM   Result Value Ref Range    Magnesium 1.8 1.7 - 2.3 mg/dL   CBC with Auto Differential    Collection Time: 01/01/23  1:28 AM   Result Value Ref Range    WBC 10.4 4.8 - 10.8 K/uL    RBC 3.71 (L) 4.20 - 5.40 M/uL    Hemoglobin 11.4 (L) 12.0 - 16.0 g/dL Hematocrit 37.0 37.0 - 47.0 %    MCV 99.7 (H) 81.0 - 99.0 fL    MCH 30.7 27.0 - 31.0 pg    MCHC 30.8 (L) 33.0 - 37.0 g/dL    RDW 14.5 11.5 - 14.5 %    Platelets 998 (L) 760 - 400 K/uL    MPV 10.3 9.4 - 12.3 fL    Neutrophils % 82.7 (H) 50.0 - 65.0 %    Lymphocytes % 9.7 (L) 20.0 - 40.0 %    Monocytes % 4.9 0.0 - 10.0 %    Eosinophils % 1.8 0.0 - 5.0 %    Basophils % 0.6 0.0 - 1.0 %    Neutrophils Absolute 8.6 (H) 1.5 - 7.5 K/uL    Immature Granulocytes # 0.0 K/uL    Lymphocytes Absolute 1.0 (L) 1.1 - 4.5 K/uL    Monocytes Absolute 0.50 0.00 - 0.90 K/uL    Eosinophils Absolute 0.20 0.00 - 0.60 K/uL    Basophils Absolute 0.10 0.00 - 0.20 K/uL   Urinalysis    Collection Time: 01/01/23  1:39 AM   Result Value Ref Range    Color, UA YELLOW Straw/Yellow    Clarity, UA Clear Clear    Glucose, Ur Negative Negative mg/dL    Bilirubin Urine Negative Negative    Ketones, Urine Negative Negative mg/dL    Specific Gravity, UA 1.008 1.005 - 1.030    Blood, Urine Negative Negative    pH, UA 6.0 5.0 - 8.0    Protein, UA Negative Negative mg/dL    Urobilinogen, Urine 0.2 <2.0 E.U./dL    Nitrite, Urine Negative Negative    Leukocyte Esterase, Urine Negative Negative   Troponin    Collection Time: 01/01/23 11:17 AM   Result Value Ref Range    Troponin <0.01 0.00 - 0.03 ng/mL        No intake/output data recorded.      aspirin  81 mg Oral Daily    carvedilol  3.125 mg Oral BID WC    atorvastatin  40 mg Oral Nightly    lisinopril  5 mg Oral Daily    sodium chloride flush  5-40 mL IntraVENous 2 times per day    enoxaparin  40 mg SubCUTAneous Daily          Assessment & Plan       CHF in Exacerbation due to Medication Noncompliance (stopped lasix): will need ACS R/O as well  Tele  Admit to cardiac metzger as inpatient status patient  Cardio consult in AM  Trend TnI  Mag, Phos, TSH, Lipid Panel, HbA1c - will run as add ons to ED labs if able  CBC and BMP with Reflex for following AM  ASA as per protocol (324-325mg chewed STAT unless on 81ASA daily in which case 162mg chewed STAT if not yet given, THEN from following AM 81mg Daily.)  Statin as per protocol (High intensity Statin, if already on high intensity Stain of Simvasttain 80 continue it, if Lipitor 40+ then Lipitor 80 (if less than 40 just double so long as >=40), if Rosuvastatin 20mg+ then Rosuvastatin 40mg PO QHS (if less than 20 just double so long as >=20mg)  Carvedilol 3.125mg PO BID to be continued with holding parameters  NG SL PRN CP  TTE in AM  Strict Is and Os  Daily Weights  Continue home lisinopril 5mg PO QdDay with holding parameters  Lasix infusion of 5mg per hour in place    home oral dose of 40mg PO QDay  Strict Is and Os  Daily weights  VS Q4h  Will monitor intake and output     seems to not understand risks of skipping her diuretic   Cog eval   CM consult for help with placement needs vs d/c  PT & OT (after at least 3 negative Tn may order)     Chronc medical problems:  Continue home regimen as indicated  RN team to call and verify narcotics with pharmacy  [place] orders PRN patient request until then  Narcan PRN for safety     DVT prophylaxis: Enoxaparin    POA  DNR   Case d/w the RN taking care of the patient  RN  notes reviewed  Consultant notes reviewed     DISPOSITION:    D/C: Home  Estimated D/C Date: 1/3      Jannine Fabry, MD 1/1/2023 2:09 PM    EMR Dragon/Transcription disclaimer: This dictation was performed using [address] [place]. Mistake and misspelling may have been created without  realizing them, although attempts have made to review the note for such errors. Please notify me for clarification.   Thank you

## 2023-01-02 ENCOUNTER — APPOINTMENT (OUTPATIENT)
Dept: GENERAL RADIOLOGY | Age: 88
End: 2023-01-02
Payer: MEDICARE

## 2023-01-02 LAB
ANION GAP SERPL CALCULATED.3IONS-SCNC: 12 MMOL/L (ref 7–19)
BASOPHILS ABSOLUTE: 0 K/UL (ref 0–0.2)
BASOPHILS RELATIVE PERCENT: 0.6 % (ref 0–1)
BUN BLDV-MCNC: 13 MG/DL (ref 8–23)
CALCIUM SERPL-MCNC: 8.5 MG/DL (ref 8.2–9.6)
CHLORIDE BLD-SCNC: 97 MMOL/L (ref 98–111)
CO2: 33 MMOL/L (ref 22–29)
CREAT SERPL-MCNC: 0.8 MG/DL (ref 0.5–0.9)
EOSINOPHILS ABSOLUTE: 0.4 K/UL (ref 0–0.6)
EOSINOPHILS RELATIVE PERCENT: 9.1 % (ref 0–5)
GFR SERPL CREATININE-BSD FRML MDRD: >60 ML/MIN/{1.73_M2}
GLUCOSE BLD-MCNC: 99 MG/DL (ref 74–109)
HCT VFR BLD CALC: 35.7 % (ref 37–47)
HEMOGLOBIN: 11.1 G/DL (ref 12–16)
IMMATURE GRANULOCYTES #: 0 K/UL
LYMPHOCYTES ABSOLUTE: 1.2 K/UL (ref 1.1–4.5)
LYMPHOCYTES RELATIVE PERCENT: 24 % (ref 20–40)
MAGNESIUM: 1.8 MG/DL (ref 1.7–2.3)
MCH RBC QN AUTO: 31 PG (ref 27–31)
MCHC RBC AUTO-ENTMCNC: 31.1 G/DL (ref 33–37)
MCV RBC AUTO: 99.7 FL (ref 81–99)
MONOCYTES ABSOLUTE: 0.6 K/UL (ref 0–0.9)
MONOCYTES RELATIVE PERCENT: 12.4 % (ref 0–10)
NEUTROPHILS ABSOLUTE: 2.6 K/UL (ref 1.5–7.5)
NEUTROPHILS RELATIVE PERCENT: 53.7 % (ref 50–65)
PDW BLD-RTO: 14.4 % (ref 11.5–14.5)
PLATELET # BLD: 121 K/UL (ref 130–400)
PMV BLD AUTO: 10.7 FL (ref 9.4–12.3)
POTASSIUM SERPL-SCNC: 3.2 MMOL/L (ref 3.5–5)
RBC # BLD: 3.58 M/UL (ref 4.2–5.4)
SODIUM BLD-SCNC: 142 MMOL/L (ref 136–145)
WBC # BLD: 4.8 K/UL (ref 4.8–10.8)

## 2023-01-02 PROCEDURE — 36415 COLL VENOUS BLD VENIPUNCTURE: CPT

## 2023-01-02 PROCEDURE — 71046 X-RAY EXAM CHEST 2 VIEWS: CPT

## 2023-01-02 PROCEDURE — 83735 ASSAY OF MAGNESIUM: CPT

## 2023-01-02 PROCEDURE — 94760 N-INVAS EAR/PLS OXIMETRY 1: CPT

## 2023-01-02 PROCEDURE — 2140000000 HC CCU INTERMEDIATE R&B

## 2023-01-02 PROCEDURE — 6370000000 HC RX 637 (ALT 250 FOR IP): Performed by: INTERNAL MEDICINE

## 2023-01-02 PROCEDURE — 6360000002 HC RX W HCPCS: Performed by: HOSPITALIST

## 2023-01-02 PROCEDURE — 2700000000 HC OXYGEN THERAPY PER DAY

## 2023-01-02 PROCEDURE — 97166 OT EVAL MOD COMPLEX 45 MIN: CPT

## 2023-01-02 PROCEDURE — 85025 COMPLETE CBC W/AUTO DIFF WBC: CPT

## 2023-01-02 PROCEDURE — 6370000000 HC RX 637 (ALT 250 FOR IP): Performed by: HOSPITALIST

## 2023-01-02 PROCEDURE — 97535 SELF CARE MNGMENT TRAINING: CPT

## 2023-01-02 PROCEDURE — 99233 SBSQ HOSP IP/OBS HIGH 50: CPT | Performed by: INTERNAL MEDICINE

## 2023-01-02 PROCEDURE — 2580000003 HC RX 258: Performed by: HOSPITALIST

## 2023-01-02 PROCEDURE — 80048 BASIC METABOLIC PNL TOTAL CA: CPT

## 2023-01-02 PROCEDURE — 71046 X-RAY EXAM CHEST 2 VIEWS: CPT | Performed by: RADIOLOGY

## 2023-01-02 RX ORDER — CHOLECALCIFEROL (VITAMIN D3) 125 MCG
1000 CAPSULE ORAL DAILY
Status: DISCONTINUED | OUTPATIENT
Start: 2023-01-02 | End: 2023-01-06 | Stop reason: HOSPADM

## 2023-01-02 RX ADMIN — SODIUM CHLORIDE, PRESERVATIVE FREE 10 ML: 5 INJECTION INTRAVENOUS at 08:38

## 2023-01-02 RX ADMIN — ASPIRIN 81 MG: 81 TABLET, CHEWABLE ORAL at 08:38

## 2023-01-02 RX ADMIN — POTASSIUM BICARBONATE 40 MEQ: 782 TABLET, EFFERVESCENT ORAL at 21:13

## 2023-01-02 RX ADMIN — LISINOPRIL 5 MG: 10 TABLET ORAL at 08:38

## 2023-01-02 RX ADMIN — FUROSEMIDE 5 MG/HR: 10 INJECTION, SOLUTION INTRAMUSCULAR; INTRAVENOUS at 15:05

## 2023-01-02 RX ADMIN — CYANOCOBALAMIN TAB 500 MCG 1000 MCG: 500 TAB at 08:38

## 2023-01-02 RX ADMIN — Medication 5 MG: at 21:08

## 2023-01-02 RX ADMIN — SODIUM CHLORIDE, PRESERVATIVE FREE 10 ML: 5 INJECTION INTRAVENOUS at 21:13

## 2023-01-02 RX ADMIN — POTASSIUM BICARBONATE 40 MEQ: 782 TABLET, EFFERVESCENT ORAL at 10:51

## 2023-01-02 RX ADMIN — ACETAMINOPHEN 650 MG: 325 TABLET ORAL at 21:11

## 2023-01-02 RX ADMIN — CARVEDILOL 3.12 MG: 3.12 TABLET, FILM COATED ORAL at 08:38

## 2023-01-02 RX ADMIN — ENOXAPARIN SODIUM 40 MG: 100 INJECTION SUBCUTANEOUS at 17:15

## 2023-01-02 RX ADMIN — CARVEDILOL 3.12 MG: 3.12 TABLET, FILM COATED ORAL at 17:15

## 2023-01-02 RX ADMIN — ATORVASTATIN CALCIUM 40 MG: 40 TABLET, FILM COATED ORAL at 21:08

## 2023-01-02 ASSESSMENT — PAIN DESCRIPTION - DESCRIPTORS: DESCRIPTORS: ACHING

## 2023-01-02 ASSESSMENT — PAIN SCALES - GENERAL: PAINLEVEL_OUTOF10: 3

## 2023-01-02 ASSESSMENT — PAIN DESCRIPTION - LOCATION: LOCATION: ABDOMEN

## 2023-01-02 NOTE — CONSULTS
Palliative Care: Pt presents to ED with SOA. SHe has a hx of CHF, Non compliant with Lsic for a few days d/t \"How it made me feel\"  Pt has hx of CHF as well. New to palliative care, initiated for support, goc, ACP. Past Medical History:        Past Medical History:   Diagnosis Date    CAD (coronary artery disease) 06/12/2013    Cardiomyopathy ([facility] [address] 06/12/2013    EF 25%    CHF (congestive heart failure) ([facility] [address]     Hyperlipemia     Dr. Yu Heart manages    Palliative care patient 01/02/2023    Status post coronary artery stent placement 06/12/2013    LAD       Advance Directives:   DNR  Pt voices understanding she must complete paperwork to name her friend, Marilee Huggins, as her HCS. She also voiced understanding that should this not be completed it will automatically fall to her grandchildren. She is willing to complete paperwork. PC  will follow up tomorrow. Pain/Other Symptoms:   Denies     Activity:    Up with assist         Psychological/Spiritual:     Good friend support. Pt tells me Marilee Huggins see her daily and will do for her whatever she needs. She also has a CG who can stay with her to help with housekeeping, cooking if needed. Pt states she is [employer].          Plan:  Medical management       Patient/family discussion r/t goals:  Pt is adamant she will [place]. She states she has been living alone since her  passed away 17 yrs ago. However, currently pt needs assist with transfers, amb, etc.  Friend who is present tells me pt had [facility] for a few weeks recently. Inquiring if pt can get these services again. Explained this would be considered after PT/OT notes and MD to decide. Pt is [place], granddtr and friend as well as CG(Azucena) were present. All seemed to have knowledge of pt needs(Especially Jazmin Nuñez). Pt tells me she is able to be ind for dressing, amb(with walker) and \"basin baths\". Contacts have been updated and reflect who pt wants to be her HCS.  Again explained paperwork will need to be completed since Arden Pratt is not related to her. She voiced understanding. DEXTER Ge will follow up tomorrow for paperwork completion.                  Electronically signed by Papo Almanzar RN on 1/2/2023 at 12:14 PM

## 2023-01-02 NOTE — CONSULTS
Comprehensive Nutrition Assessment    Type and Reason for Visit:  Initial, Consult    Nutrition Recommendations/Plan:   Follow for SLP recommendations. Modify current diet until evaluation is complee  Start ONS     Malnutrition Assessment:  Malnutrition Status: At risk for malnutrition (Comment) (01/02/23 1104)    Context:  Acute Illness     Findings of the 6 clinical characteristics of malnutrition:  Energy Intake:  50% or less of estimated energy requirements for 5 or more days  Weight Loss: Body Fat Loss:  No significant body fat loss     Muscle Mass Loss:  No significant muscle mass loss    Fluid Accumulation:  Moderate to Severe Extremities   Strength:  Not Performed    Nutrition Assessment:    +NS for unstageable wound and Consult for CHF education. Pt does not have bottom teeth an is chokes with bread and dry meats. Aware has SLP consult. Will modify diet with grd meat, overcooked veg and follow for SLP recommendations    Nutrition Related Findings:    thin appearing. proBNP 6110 Wound Type: Surgical Incision       Current Nutrition Intake & Therapies:    Average Meal Intake: 26-50%  Average Supplements Intake: None Ordered  ADULT DIET; Regular; No Added Salt (3-4 gm)    Anthropometric Measures:  Height: 5' (152.4 cm)  Ideal Body Weight (IBW): 100 lbs (45 kg)    Admission Body Weight: 130 lb (59 kg) (stated)  Current Body Weight: 117 lb 11.2 oz (53.4 kg), 117.7 % IBW.  Weight Source: Bed Scale  Current BMI (kg/m2): 23  Usual Body Weight: 130 lb (59 kg) (10/2022)  % Weight Change (Calculated): -9.5  Weight Adjustment For: No Adjustment                 BMI Categories: Normal Weight (BMI 22.0 to 24.9) age over 72    Estimated Daily Nutrient Needs:  Energy Requirements Based On: Kcal/kg  Weight Used for Energy Requirements: Current  Energy (kcal/day): 0953-0872 kcals (25-30 kcals/kg)  Weight Used for Protein Requirements: Current  Protein (g/day): 53-107g  Method Used for Fluid Requirements: 1 ml/kcal  Fluid (ml/day): 6307-4225 ml    Nutrition Diagnosis:   Inadequate oral intake, Altered nutrition-related lab values related to partial or complete edentulism, biting/chewing (masticatory) difficulty, cardiac dysfunction, increase demand for energy/nutrients as evidenced by intake 26-50%, weight loss, wounds, lab values    Nutrition Interventions:   Food and/or Nutrient Delivery: Modify Current Diet, Start Oral Nutrition Supplement  Nutrition Education/Counseling: Education completed, Counseling completed  Coordination of Nutrition Care: Continue to monitor while inpatient, Speech Therapy  Plan of Care discussed with: pt and familly    Goals:     Goals: Meet at least 75% of estimated needs, PO intake 50% or greater       Nutrition Monitoring and Evaluation:   Behavioral-Environmental Outcomes: Readiness for Change  Food/Nutrient Intake Outcomes: Food and Nutrient Intake, Supplement Intake  Physical Signs/Symptoms Outcomes: Biochemical Data, Chewing or Swallowing, Fluid Status or Edema, Weight, Skin    Discharge Planning:     Too soon to determine     Sunil Quinonez MS, RD, LD  Contact: [phone]

## 2023-01-02 NOTE — ACP (ADVANCE CARE PLANNING)
Advance Care Planning     Advance Care Planning Activator (Inpatient)  Conversation Note      Date of ACP Conversation: 1/2/2023     [employer] with: Pt with decision making capacity. ACP Activator: Job Tenorio RN      Health Care Decision Maker:     Current Designated Health Care Decision Maker:     Primary Decision Maker: Anu Amaya Kelsey Romeo - [phone]    Secondary Decision Maker: Madyson Saravia - [phone]    Care Preferences    Ventilation: \"If you were in your present state of health and suddenly became very ill and were unable to breathe on your own, what would your preference be about the use of a ventilator (breathing machine) if it were available to you? \"      Would the patient desire the use of ventilator (breathing machine)?: No        Resuscitation  \"CPR works best to restart the heart when there is a sudden event, like a heart attack, in someone who is otherwise healthy. Unfortunately, CPR does not typically restart the heart for people who have serious health conditions or who are very sick. \"    \"In the event your heart stopped as a result of an underlying serious health condition, would you want attempts to be made to restart your heart (answer \"yes\" for attempt to resuscitate) or would you prefer a natural death (answer \"no\" for do not attempt to resuscitate)? \" No       Conversation Outcomes:  [x] ACP discussion completed  [] Existing advance directive reviewed with patient; no changes to patient's previously recorded wishes  [] New Advance Directive completed  [] Portable Do Not Rescitate prepared for Provider review and signature  [] POLST/POST/MOLST/MOST prepared for Provider review and signature      Follow-up plan:    [x] Schedule follow-up conversation to continue planning  [] Referred individual to Provider for additional questions/concerns   [] Advised patient/agent/surrogate to review completed ACP document and update if needed with changes in condition, patient preferences or care setting    [] This note routed to one or more involved healthcare providers       Pt voices understanding she must complete paperwork to name her friend, Vick Celestin, as her HCS. She also voiced understanding that should this not be completed it will automatically fall to her grandchildren. She is willing to complete paperwork. PC  will follow up tomorrow.     Electronically signed by Frances Jaffe RN on 1/2/2023 at 1:13 PM

## 2023-01-02 NOTE — CARE COORDINATION
Faxed  To [employer]. Awaiting the the Jennifer Scan. Please  fax Jennifer when completed. Thanks.    [employer] Intake   P: [phone]  F: [phone]   [facility] cell [phone]  Electronically signed by KRISTIN Contreras on 1/2/2023 at 9:07 AM

## 2023-01-02 NOTE — PROGRESS NOTES
Occupational Therapy Initial Assessment  Date: 2023   Patient Name: Vicenta Spencer  MRN: [id number]     : 1930    Date of Service: 2023    Discharge Recommendations:  Patient would benefit from continued therapy after discharge, 24 hour supervision or assist  OT Equipment Recommendations  Equipment Needed: Yes  [place] [facility] [place]   Assessment: Evaluation completed and tx initiated. The patient would benefit from further therapy to upgrade safety and mobility/balance for ADL. Recommend the patient have 24/7 assist upon initial discharge to home to observe and assist with transition back to PLOF  Treatment Diagnosis: CHF, pressure ulcers bilateral heels  REQUIRES OT FOLLOW-UP: Yes  Activity Tolerance  Activity Tolerance: Patient Tolerated treatment well           Patient Diagnosis(es): The encounter diagnosis was Congestive heart failure, unspecified HF chronicity, unspecified heart failure type ([facility] [address] has a past medical history of CAD (coronary artery disease), Cardiomyopathy ([facility] [address] CHF (congestive heart failure) ([facility] [address] Hyperlipemia, Palliative care patient, and Status post coronary artery stent placement. has a past surgical history that includes Cardiac catheterization (2013   [facility]); back surgery; Appendectomy; and Tonsillectomy.     Treatment Diagnosis: CHF, pressure ulcers bilateral heels      Restrictions  Restrictions/Precautions  Restrictions/Precautions: Fall Risk    Subjective   General  Chart Reviewed: Yes  Patient assessed for rehabilitation services?: Yes  Family / Caregiver Present: Yes (granddtr, best friend)  Pre Treatment Pain Screening  Pain at present: 0  Scale Used: Numeric Score  Intervention List: Patient able to continue with treatment  Comments / Details: no complaint of pain with activity  Vital Signs  Heart Rate: 72  Resp: 20  Oxygen Therapy  SpO2: 97 %  O2 Device: Nasal cannula  O2 Flow Rate (L/min): 2 L/min    Social/Functional History  Social/Functional History  Lives With: Alone (with support)  Bathroom Shower/Tub: Tub/Shower unit  Home Equipment: Hunter Cervantes, 4 wheeled  ADL Assistance: Independent  Ambulation Assistance: Independent  Transfer Assistance: Independent       Objective              Toilet Transfers  Toilet - Technique: Stand step  Equipment Used: Standard bedside commode  Toilet Transfer: Minimal assistance  ADL  Feeding: Independent  Grooming: Independent;Setup  UE Bathing: Minimal assistance  LE Bathing: Minimal assistance  UE Dressing: Modified independent ;Setup  LE Dressing: Minimal assistance  Toileting: Minimal assistance; Moderate assistance (assist for hygiene due to incontinence)  Additional Comments: using purewick currently, incontinent of urine each time standing        Bed mobility  Rolling to Right: Stand by assistance  Supine to Sit: Minimal assistance        Cognition  Overall Cognitive Status: WFL                 LUE PROM (degrees)  LUE PROM: WFL  LUE AROM (degrees)  LUE AROM : WFL  LUE General AROM: tested supine  RUE PROM (degrees)  RUE General PROM: 130 degrees shoulder flexion, distally WNL  RUE AROM (degrees)  RUE General AROM: In supine, unable to raise shoulder off bed, distally WNL                    Plan   Occupational Therapy Plan  Times Per Week: 3-5    Goals  Short Term Goals  Short Term Goal 1: Perform bed mobility and transfers with CGA  Short Term Goal 2: Perform dressing and toileting with CGA  Short Term Goal 3: Verbalize DME recommendations and home safety recommendations  Short Term Goal 4: Independent with therapeutic exercise recommendations  Long Term Goals  Long Term Goal 1: Upgrade as tolerated     Tx initiated:  Hygiene activities due to urinary incontinence, movement strategies, beginning level therapeutic activities.   (15 mins)          Addis Buckley OT/L  Electronically signed by Addis PENNY on 1/2/2023 at 12:55 PM.

## 2023-01-02 NOTE — PROGRESS NOTES
Cardiology Consultation      I personally saw the patient and rounded with:  RN, on  1/1/23      The observations documented in this note, including the assessment and plan are mine          Date of Admission:  12/31/2022  5:50 PM    Date of Initially Being Seen / Consultation:  1/1/23    Cardiologist:  Heath Hurst 53 Attending: Dr. Dorita Cortez     PCP:  Darylene Hoyle    Reason for Consultation or Admission / Chief Complaint:  sob and leg swelling    Progress note  Subjective:  Patient is seen and examined. No acute event overnight. SOB much better. Denies any active chest pain or palpitation. Cardiovascular review of systems otherwise negative. SUBJECTIVE AND HISTORY OF PRESENT ILLNESS:    Source of the history:  Patient, family, previous inpatient and outpatient records in [facility]. Verito Dukes is a 80 y.o. female who presents to [facility] with chief complaint of leg swelling and sob due to medication noncompliance consulted to the cardiology team for further work-up.  + sob. Denies any chest pain. Patient denies any diaphoresis. Denies any palpitation. + leg swelling. Denies any focal weakness or numbness. Denies any hematemesis or melena or easy bruising or bleeding. Cardiovascular review of system is otherwise negative.       Family present:      CARDIAC RISK PROFILE:    Risk Factor Yes / No / Unknown       Gender Female   Cigarette Use No   Family History of Cardiovascular Disease No   Diabetes Mellitus no   Hypercholesteremia yes   Hypertension yes          Cardiac Specific Problems:    Specialty Problems          Cardiology Problems    PAD (peripheral artery disease) ([facility])        * (Principal) Systolic and diastolic CHF, acute on chronic (HCC)        Ischemic dilated cardiomyopathy (HCC)        Mixed hyperlipidemia        AAA (abdominal aortic aneurysm)        Chronic systolic congestive heart failure (HCC)        Descending thoracic aortic aneurysm        Hyperlipemia Coronary artery disease involving native heart without angina pectoris        Hyperlipidemia             PRIOR CARDIAC PROBLEM LIST  (IF APPLICABLE):    CHF  CAD      Past Medical History:    Past Medical History:   Diagnosis Date    CAD (coronary artery disease) 6/12/2013    Cardiomyopathy ([address] 6/12/2013    EF 25%    CHF (congestive heart failure) ([facility])     Hyperlipemia     Dr. Katie Cast manages    Status post coronary artery stent placement 6/12/2013    LAD         Past Surgical History:    Past Surgical History:   Procedure Laterality Date    APPENDECTOMY      BACK SURGERY      CARDIAC CATHETERIZATION  4/8/2013   [facility]    EF 20-25%    TONSILLECTOMY           Home Medications:   Prior to Admission medications    Medication Sig Start Date End Date Taking? Authorizing Provider   carvedilol (COREG) 3.125 MG tablet TAKE 1 TABLET BY MOUTH 2 TIMES DAILY (WITH MEALS) 1/19/18   IZA Caro   lisinopril (PRINIVIL;ZESTRIL) 5 MG tablet Take 1 tablet by mouth daily 4/2/16   Regine Cleveland MD   pravastatin (PRAVACHOL) 40 MG tablet Take 40 mg by mouth daily. Historical Provider, MD   HYDROcodone-acetaminophen (NORCO) 5-325 MG per tablet Take 1 tablet by mouth every 6 hours as needed for Pain. Historical Provider, MD   furosemide (LASIX) 40 MG tablet Take 40 mg by mouth daily    Historical Provider, MD   aspirin 81 MG tablet Take 81 mg by mouth daily. Historical Provider, MD        Facility Administered Medications:    vitamin B-12  1,000 mcg Oral Daily    aspirin  81 mg Oral Daily    carvedilol  3.125 mg Oral BID WC    atorvastatin  40 mg Oral Nightly    lisinopril  5 mg Oral Daily    sodium chloride flush  5-40 mL IntraVENous 2 times per day    enoxaparin  40 mg SubCUTAneous Daily       Allergies:  Codeine, Cortizone, Penicillins, and Sulfa antibiotics     Social History:       Social History     Socioeconomic History    Marital status:       Spouse name: Not on file    Number of children: 1    Years of education: Not on file    Highest education level: Not on file   Occupational History    Occupation: worked in a factory   Tobacco Use    Smoking status: Never    Smokeless tobacco: Never   Vaping Use    Vaping Use: Never used   Substance and Sexual Activity    Alcohol use: Not Currently     Comment: \"Maybe a few drinks years ago but I don't drink anymore\"    Drug use: No    Sexual activity: Not on file     Comment: had a son   Other Topics Concern    Not on file   Social History Narrative    CODE STATUS: DNR    HEALTH CARE PROXY: either Hernan Toyin of [address] or [place] of [address] area    AMBULATES: uses a walker    DOMICILED: lives alone     Social Determinants of Health     Financial Resource Strain: Not on [employer] Insecurity: Not on file   Transportation Needs: Not on file   Physical Activity: Not on file   Stress: Not on file   Social Connections: Not on file   Intimate Partner Violence: Not on file   Housing Stability: Not on file       Family History:     Family History   Problem Relation Age of Onset    Cancer Sister          REVIEW OF SYSTEMS:     Constitutional: Negative. Eyes: Negative. Respiratory: Negative. Cardiac:   Gastrointestinal: Negative. Genitourinary: Negative. Musculoskeletal: Negative. Skin: Negative. Neurological:  negative  Hematological: Negative. Psychiatric/Behavioral: Negative. PHYSICAL EXAMINATION:     BP (!) 108/56   Pulse 65   Temp 97.4 °F (36.3 °C) (Temporal)   Resp 18   Ht 5' (1.524 m)   Wt 120 lb 11.2 oz (54.7 kg)   SpO2 96%   BMI 23.57 kg/m²     Vitals and nursing note reviewed. Constitutional:       Appearance:  well-developed. HENT:      Head: Normocephalic and atraumatic. Eyes:      General:         Right eye: No discharge. Left eye: No discharge. Conjunctiva/sclera: Conjunctivae normal.      Pupils: Pupils are equal, round, and reactive to light.    Cardiovascular:      Rate and Rhythm: Normal rate and regular rhythm. Heart sounds: No significant murmur. Pulmonary:      Effort: Pulmonary effort is normal. No respiratory distress. Breath sounds: Normal breath sounds. No wheezing or rales. Abdominal:      General: Bowel sounds are normal.      Palpations: Abdomen is soft. There is no mass. Tenderness: There is no abdominal tenderness. There is no guarding or rebound. Musculoskeletal:         General: No tenderness. Normal range of motion. Cervical back: Normal range of motion and neck supple. Skin:     General: Skin is warm and dry. Capillary Refill: Capillary refill takes less than 2 seconds. Neurological:      Mental Status:  alert and oriented to person, place, and time. Psychiatric:         Behavior: Behavior normal.         Thought Content:  Thought content normal.         Judgment: Judgment normal.     LABORATORY EVALUATION & TESTING:    I have personally reviewed and interpreted the results of the following diagnostic testing      EKG and or Telemetry:  which was personally reviewed me:      Troponin: Troponins are . the creatinine is     CBC:   Recent Labs     12/31/22  1755 01/01/23  0128   WBC 11.1* 10.4   HGB 12.4 11.4*   HCT 39.7 37.0   MCV 99.0 99.7*    127*     BMP:   Recent Labs     12/31/22  1755 12/31/22  1849 01/01/23  0128     --  142   K 4.1 3.7 3.9     --  102   CO2 26  --  27   PHOS 3.7  --   --    BUN 14  --  15   CREATININE 0.8  --  0.8     Cardiac Enzymes:   Recent Labs     01/01/23  1117 01/01/23  1538 01/01/23  1906   TROPONINI <0.01 <0.01 <0.01     PT/INR:   Recent Labs     12/31/22  1755   PROTIME 14.4   INR 1.12     APTT:   Recent Labs     12/31/22  1755   APTT 26.8     Liver Profile:  Lab Results   Component Value Date/Time    AST 14 12/31/2022 05:55 PM    ALT <5 12/31/2022 05:55 PM    BILITOT 1.1 12/31/2022 05:55 PM    ALKPHOS 75 12/31/2022 05:55 PM     Lab Results   Component Value Date/Time    CHOL 121 12/31/2022 05:55 PM HDL 60 12/31/2022 05:55 PM    TRIG 99 12/31/2022 05:55 PM     TSH:  No results found for: TSH  UA:   Lab Results   Component Value Date/Time    COLORU YELLOW 01/01/2023 01:39 AM    PHUR 6.0 01/01/2023 01:39 AM    WBCUA 1 02/08/2022 04:35 PM    RBCUA 4 02/08/2022 04:35 PM    MUCUS 3+ 04/05/2013 05:50 PM    YEAST 2+ 04/05/2013 05:50 PM    BACTERIA NEGATIVE 02/08/2022 04:35 PM    CLARITYU Clear 01/01/2023 01:39 AM    SPECGRAV 1.008 01/01/2023 01:39 AM    LEUKOCYTESUR Negative 01/01/2023 01:39 AM    UROBILINOGEN 0.2 01/01/2023 01:39 AM    BILIRUBINUR Negative 01/01/2023 01:39 AM    BLOODU Negative 01/01/2023 01:39 AM    GLUCOSEU Negative 01/01/2023 01:39 AM     TTE:   Normal left ventricular size with severely reduced LV function and an  estimated ejection fraction of approximately 35%. Mild concentric left ventricular hypertrophy. Unable to accurately assess diastolic function due to mitral stenosis    Ballooned out, akinetic apex with all other wall segments being hypokinetic. No evidence of left ventricular mass or thrombus noted. Mild to Moderate mitral valve stenosis (mean pressure gradient of 7 mmHg),  trace mitral regurgitation. Moderately thickened and calcified aortic valve with moderately reduced  leaflet mobility. Individual aortic valve leaflets are not clearly visualized, appears to be   tricuspid though. Moderate aortic stenosis; no evidence of significant aortic regurgitation. The aortic valve area is 1.05 cm2 with a maximum gradient of 52 mmHg and a   mean gradient of 28 mmHg. Right ventricular systolic pressure of 36 mm Hg consistent with mild   pulmonary hypertension. Moderate to severely dilated left atrium. Floppy intra-atrial septum. Normal intact intra-atrial septum was noted with no evidence of   significant intra-atrial communications by color flow Doppler or by   agitated saline study.         Assessment:  Shortness of breath  H/O CAD/MI S/P stent in LAD  Acute on chronic combined systolic and diastolic heart failure with LVEF of 35%  Ballooned out apex may be indicative of Takotsubo cardiomyopathy   Moderate AS  Mild to moderate MS  Mild PHTN  Hypertension  Hyperlipidemia  Macrocytic Anemia      Plan:  Troponin negative   Pro BNP elevated  Continue to monitor on telemetry  Echocardiogram suggestive of EF of 35% with moderate aortic stenosis and mild to moderate mitral stenosis. Continue aspirin, statin, BB  Continue lasix, monitor BMP  Patient may benefit from LifeVest. Discussed the risk and benefits. Agreed to try it. Patient is DNR    I had a detailed discussion with the patient and / or family regarding the historical points, physical examination findings, and any diagnostic results supporting the admission diagnosis. The patient was educated on care and need for admission. questions were invited and answered. Patient and / or family shows understanding of admission information and agrees to follow. I have discussed the risks, benefits and options with the patient and her family. They appear to understand, have no questions, and wish to proceed. Discussed with patient and family and nursing. I greatly appreciate the opportunity and your confidence in allowing me to participate in the care of Juventino Solano    Electronically signed by Arturo Ellsworth DO on 1/1/23   Interventional cardiologist, [facility] [place].

## 2023-01-03 PROBLEM — Z51.5 PALLIATIVE CARE PATIENT: Status: ACTIVE | Noted: 2023-01-03

## 2023-01-03 LAB
ANION GAP SERPL CALCULATED.3IONS-SCNC: 10 MMOL/L (ref 7–19)
BASOPHILS ABSOLUTE: 0 K/UL (ref 0–0.2)
BASOPHILS RELATIVE PERCENT: 0.4 % (ref 0–1)
BUN BLDV-MCNC: 11 MG/DL (ref 8–23)
CALCIUM SERPL-MCNC: 8.7 MG/DL (ref 8.2–9.6)
CHLORIDE BLD-SCNC: 94 MMOL/L (ref 98–111)
CO2: 38 MMOL/L (ref 22–29)
CREAT SERPL-MCNC: 0.7 MG/DL (ref 0.5–0.9)
EKG P AXIS: 85 DEGREES
EKG P-R INTERVAL: 220 MS
EKG Q-T INTERVAL: 440 MS
EKG QRS DURATION: 112 MS
EKG QTC CALCULATION (BAZETT): 469 MS
EKG T AXIS: 77 DEGREES
EOSINOPHILS ABSOLUTE: 0.3 K/UL (ref 0–0.6)
EOSINOPHILS RELATIVE PERCENT: 5.5 % (ref 0–5)
GFR SERPL CREATININE-BSD FRML MDRD: >60 ML/MIN/{1.73_M2}
GLUCOSE BLD-MCNC: 107 MG/DL (ref 74–109)
HCT VFR BLD CALC: 37.2 % (ref 37–47)
HEMOGLOBIN: 11.9 G/DL (ref 12–16)
IMMATURE GRANULOCYTES #: 0 K/UL
LYMPHOCYTES ABSOLUTE: 1.8 K/UL (ref 1.1–4.5)
LYMPHOCYTES RELATIVE PERCENT: 33.2 % (ref 20–40)
MAGNESIUM: 1.7 MG/DL (ref 1.7–2.3)
MCH RBC QN AUTO: 31 PG (ref 27–31)
MCHC RBC AUTO-ENTMCNC: 32 G/DL (ref 33–37)
MCV RBC AUTO: 96.9 FL (ref 81–99)
MONOCYTES ABSOLUTE: 0.7 K/UL (ref 0–0.9)
MONOCYTES RELATIVE PERCENT: 13.1 % (ref 0–10)
NEUTROPHILS ABSOLUTE: 2.6 K/UL (ref 1.5–7.5)
NEUTROPHILS RELATIVE PERCENT: 47.6 % (ref 50–65)
PDW BLD-RTO: 14.2 % (ref 11.5–14.5)
PLATELET # BLD: 133 K/UL (ref 130–400)
PMV BLD AUTO: 10.4 FL (ref 9.4–12.3)
POTASSIUM SERPL-SCNC: 3.2 MMOL/L (ref 3.5–5)
PRO-BNP: 5826 PG/ML (ref 0–1800)
RBC # BLD: 3.84 M/UL (ref 4.2–5.4)
SODIUM BLD-SCNC: 142 MMOL/L (ref 136–145)
WBC # BLD: 5.4 K/UL (ref 4.8–10.8)

## 2023-01-03 PROCEDURE — 94760 N-INVAS EAR/PLS OXIMETRY 1: CPT

## 2023-01-03 PROCEDURE — 6370000000 HC RX 637 (ALT 250 FOR IP): Performed by: FAMILY MEDICINE

## 2023-01-03 PROCEDURE — 85025 COMPLETE CBC W/AUTO DIFF WBC: CPT

## 2023-01-03 PROCEDURE — 6360000002 HC RX W HCPCS: Performed by: HOSPITALIST

## 2023-01-03 PROCEDURE — 2700000000 HC OXYGEN THERAPY PER DAY

## 2023-01-03 PROCEDURE — 2580000003 HC RX 258: Performed by: HOSPITALIST

## 2023-01-03 PROCEDURE — 80048 BASIC METABOLIC PNL TOTAL CA: CPT

## 2023-01-03 PROCEDURE — 36415 COLL VENOUS BLD VENIPUNCTURE: CPT

## 2023-01-03 PROCEDURE — 83735 ASSAY OF MAGNESIUM: CPT

## 2023-01-03 PROCEDURE — 6370000000 HC RX 637 (ALT 250 FOR IP): Performed by: HOSPITALIST

## 2023-01-03 PROCEDURE — 99233 SBSQ HOSP IP/OBS HIGH 50: CPT | Performed by: INTERNAL MEDICINE

## 2023-01-03 PROCEDURE — 6370000000 HC RX 637 (ALT 250 FOR IP): Performed by: INTERNAL MEDICINE

## 2023-01-03 PROCEDURE — 2140000000 HC CCU INTERMEDIATE R&B

## 2023-01-03 PROCEDURE — 93010 ELECTROCARDIOGRAM REPORT: CPT | Performed by: INTERNAL MEDICINE

## 2023-01-03 PROCEDURE — 92610 EVALUATE SWALLOWING FUNCTION: CPT

## 2023-01-03 PROCEDURE — 92523 SPEECH SOUND LANG COMPREHEN: CPT

## 2023-01-03 PROCEDURE — 83880 ASSAY OF NATRIURETIC PEPTIDE: CPT

## 2023-01-03 RX ORDER — POTASSIUM CHLORIDE 20 MEQ/1
40 TABLET, EXTENDED RELEASE ORAL ONCE
Status: COMPLETED | OUTPATIENT
Start: 2023-01-03 | End: 2023-01-03

## 2023-01-03 RX ORDER — HYDROCODONE BITARTRATE AND ACETAMINOPHEN 5; 325 MG/1; MG/1
1 TABLET ORAL EVERY 6 HOURS PRN
Status: DISCONTINUED | OUTPATIENT
Start: 2023-01-03 | End: 2023-01-06 | Stop reason: HOSPADM

## 2023-01-03 RX ADMIN — CYANOCOBALAMIN TAB 500 MCG 1000 MCG: 500 TAB at 10:15

## 2023-01-03 RX ADMIN — CARVEDILOL 3.12 MG: 3.12 TABLET, FILM COATED ORAL at 10:15

## 2023-01-03 RX ADMIN — SODIUM CHLORIDE, PRESERVATIVE FREE 10 ML: 5 INJECTION INTRAVENOUS at 20:51

## 2023-01-03 RX ADMIN — LISINOPRIL 5 MG: 10 TABLET ORAL at 10:16

## 2023-01-03 RX ADMIN — CARVEDILOL 3.12 MG: 3.12 TABLET, FILM COATED ORAL at 17:27

## 2023-01-03 RX ADMIN — SODIUM CHLORIDE, PRESERVATIVE FREE 10 ML: 5 INJECTION INTRAVENOUS at 10:16

## 2023-01-03 RX ADMIN — ACETAMINOPHEN 650 MG: 325 TABLET ORAL at 06:17

## 2023-01-03 RX ADMIN — ATORVASTATIN CALCIUM 40 MG: 40 TABLET, FILM COATED ORAL at 20:50

## 2023-01-03 RX ADMIN — HYDROCODONE BITARTRATE AND ACETAMINOPHEN 1 TABLET: 5; 325 TABLET ORAL at 17:27

## 2023-01-03 RX ADMIN — POTASSIUM CHLORIDE 40 MEQ: 1500 TABLET, EXTENDED RELEASE ORAL at 17:27

## 2023-01-03 RX ADMIN — ENOXAPARIN SODIUM 40 MG: 100 INJECTION SUBCUTANEOUS at 17:26

## 2023-01-03 RX ADMIN — ASPIRIN 81 MG: 81 TABLET, CHEWABLE ORAL at 10:15

## 2023-01-03 ASSESSMENT — PAIN DESCRIPTION - DESCRIPTORS: DESCRIPTORS: ACHING

## 2023-01-03 ASSESSMENT — PAIN SCALES - GENERAL
PAINLEVEL_OUTOF10: 3
PAINLEVEL_OUTOF10: 0

## 2023-01-03 ASSESSMENT — PAIN DESCRIPTION - LOCATION: LOCATION: HEAD

## 2023-01-03 NOTE — PROGRESS NOTES
[facility]  Nurse  Consult Note       NAME:  Michael Bowie RECORD NUMBER:  [id number]  AGE: 80 y.o. GENDER: female  : 1930  TODAY'S DATE:  1/3/2023    Subjective   Reason for Wound Nurse Evaluation and Assessment: Left Heel      rTue Bradford is a 80 y.o. female referred by:   [] Physician  [x] Nursing  [] Other:     Wound Identification:  Wound Type: pressure  Contributing Factors: edema and non-adherence    Wound History: First documented 10/19/22 at [facility]  Current Wound Care Treatment:  betadine, offloading    Patient Goal of Care:  [x] Wound Healing  [] Odor Control  [x] Palliative Care  [] Pain Control   [] Other:         PAST MEDICAL HISTORY        Diagnosis Date    CAD (coronary artery disease) 2013    Cardiomyopathy ([address] 2013    EF 25%    CHF (congestive heart failure) ([facility])     Hyperlipemia     Dr. Melinda Guthrie manages    Palliative care patient 2023    Status post coronary artery stent placement 2013    LAD       PAST SURGICAL HISTORY    Past Surgical History:   Procedure Laterality Date    APPENDECTOMY      BACK SURGERY      CARDIAC CATHETERIZATION  2013   [facility]    EF 20-25%    TONSILLECTOMY         FAMILY HISTORY    Family History   Problem Relation Age of Onset    Cancer Sister        SOCIAL HISTORY    Social History     Tobacco Use    Smoking status: Never    Smokeless tobacco: Never   Vaping Use    Vaping Use: Never used   Substance Use Topics    Alcohol use: Not Currently     Comment: \"Maybe a few drinks years ago but I don't drink anymore\"    Drug use: No       ALLERGIES    Allergies   Allergen Reactions    Codeine      Sick to her stomach    Cortizone      Sick to her stomach    Penicillins      Sick to her stomach    Sulfa Antibiotics      Sick to her stomach       MEDICATIONS    No current facility-administered medications on file prior to encounter.      Current Outpatient Medications on File Prior to Encounter   Medication Sig Dispense Refill    carvedilol (COREG) 3.125 MG tablet TAKE 1 TABLET BY MOUTH 2 TIMES DAILY (WITH MEALS) 60 tablet 0    lisinopril (PRINIVIL;ZESTRIL) 5 MG tablet Take 1 tablet by mouth daily 30 tablet 3    pravastatin (PRAVACHOL) 40 MG tablet Take 40 mg by mouth daily. HYDROcodone-acetaminophen (NORCO) 5-325 MG per tablet Take 1 tablet by mouth every 6 hours as needed for Pain. furosemide (LASIX) 40 MG tablet Take 40 mg by mouth daily      aspirin 81 MG tablet Take 81 mg by mouth daily.          Objective    BP (!) 128/56   Pulse 64   Temp 97.2 °F (36.2 °C) (Temporal)   Resp 16   Ht 5' (1.524 m)   Wt 117 lb 11.2 oz (53.4 kg)   SpO2 98%   BMI 22.99 kg/m²     LABS:  WBC:    Lab Results   Component Value Date/Time    WBC 5.4 01/03/2023 02:20 AM     H/H:    Lab Results   Component Value Date/Time    HGB 11.9 01/03/2023 02:20 AM    HCT 37.2 01/03/2023 02:20 AM     PTT:    Lab Results   Component Value Date/Time    APTT 26.8 12/31/2022 05:55 PM    PTT 28.2 04/17/2013 05:21 PM   [APTT}  PT/INR:    Lab Results   Component Value Date/Time    PROTIME 14.4 12/31/2022 05:55 PM    INR 1.12 12/31/2022 05:55 PM     HgBA1c:    Lab Results   Component Value Date/Time    LABA1C 4.8 12/31/2022 05:55 PM       Assessment   Carroll Risk Score: Carroll Scale Score: 14    Patient Active Problem List   Diagnosis Code    Ischemic dilated cardiomyopathy (HCC) I25.5, I42.0    Status post coronary artery stent placement Z95.5    Mixed hyperlipidemia E78.2    CAP (community acquired pneumonia) J18.9    Cholelithiasis K80.20    Diverticulosis K57.90    Hyperlipemia E78.5    Chronic systolic congestive heart failure (HCC) I50.22    Acute respiratory failure with hypoxia (HCC) J96.01    AAA (abdominal aortic aneurysm) I71.40    Descending thoracic aortic aneurysm I71.23    Pneumonia of right lower lobe due to infectious organism J18.9    Calculus of gallbladder without cholecystitis without obstruction K80.20    Coronary artery disease involving native heart without angina pectoris I25.10    Diverticulosis of intestine without bleeding K57.90    Hyperlipidemia E78.5    Unstageable pressure ulcer of left heel ([facility]) L89.620    Unstageable pressure ulcer of right heel ([facility]) L89.610    PAD (peripheral artery disease) ([facility]) I73.9    Medically noncompliant A30.718    Systolic and diastolic CHF, acute on chronic ([facility]) I50.43    Palliative care patient Z51.5       Measurements:  Wound 10/19/22 Heel Left wound 1- left heel unstagable (Active)   Wound Image   01/03/23 1200   Wound Etiology Pressure Unstageable 01/03/23 1200   Dressing Status New dressing applied 01/03/23 1200   Wound Cleansed Betadine/povidone iodine 01/03/23 1200   Dressing/Treatment Betadine swabs/povidone iodine 01/03/23 1200   Dressing Change Due 01/03/23 01/03/23 1200   Wound Length (cm) 2.5 cm 01/03/23 1200   Wound Width (cm) 2.1 cm 01/03/23 1200   Wound Depth (cm) 0.1 cm 12/13/22 1320   Wound Surface Area (cm^2) 5.25 cm^2 01/03/23 1200   Change in Wound Size % (l*w) 73.75 01/03/23 1200   Wound Volume (cm^3) 0.55 cm^3 12/13/22 1320   Wound Healing % 73 12/13/22 1320   Post-Procedure Length (cm) 2.5 cm 12/13/22 1352   Post-Procedure Width (cm) 2.2 cm 12/13/22 1352   Post-Procedure Depth (cm) 0.1 cm 12/13/22 1352   Post-Procedure Surface Area (cm^2) 5.5 cm^2 12/13/22 1352   Post-Procedure Volume (cm^3) 0.55 cm^3 12/13/22 1352   Wound Assessment Eschar dry 01/03/23 1200   Drainage Amount None 01/03/23 1200   Odor None 01/03/23 1200   Nikki-wound Assessment Intact 01/03/23 1200   Margins Attached edges; Defined edges 01/03/23 1200   Wound Thickness Description not for Pressure Injury Full thickness 01/02/23 0844   Number of days: 75            Response to treatment:  Well tolerated by patient.      Pain Assessment:  Severity:  0 / 10  Quality of pain: N/A  Wound Pain Timing/Severity: none  Premedicated: No    Plan   Plan of Care: Wound 10/19/22 Heel Left wound 1- left heel unstagable-Dressing/Treatment: Betadine swabs/povidone iodine    Specialty Bed Required : Yes   [x] Low Air Loss   [] Pressure Redistribution  [] Fluid Immersion  [] Bariatric  [] Total Pressure Relief  [] Other:     Current Diet: ADULT DIET; Regular; No Added Salt (3-4 gm); likes overcooked veg, fruit, grd meat with gravy. NO Bread/rice  ADULT ORAL NUTRITION SUPPLEMENT; Lunch, Dinner; Fortified Pudding Oral Supplement  Dietician consult:  Yes    Discharge Plan:  Placement for patient upon discharge: TBD     Patient appropriate for Outpatient [address]: Yes    (Currently a patient at [facility]. Next appointment 1/10/2023. Referrals:  []   [] [address] [employer]  [] Supplies  [] Other    Patient/Caregiver Teaching:  Level of patient/caregiver understanding able to:   [] Indicates understanding       [] Needs reinforcement  [] Unsuccessful      [] Verbal Understanding  [] Demonstrated understanding       [] No evidence of learning  [] Refused teaching         [x] N/A       Patient has an unstageable pressure injury to the left heel for which she has been seeing [address] [place] [address] wound care [place]. Patient is aware that she has a \"bed sore\" and that she doesn't want another one, but doesn't know who she sees for wound care. She did state \"that doctor\" got a compression sock for me. She states that she has not been wearing the compression sock because it makes her leg swell more. Current wound care from [facility] is float heel and wear compression, but patient is declining compression at this time. Unfortunately a silicone border dressing was placed on wound and has caused the eschar to become moist. Removed. Will paint with betadine to try to dry back out while in hospital.     Recommendation:    LEFT HEEL: Paint with betadine 2 times daily. Do not cover with any other dressing. Keep floated off all surfaces at all times. Elevate legs while in bed/chair.     Electronically signed by   Pinky Barger RN, [facility] 1/3/2023

## 2023-01-03 NOTE — PROGRESS NOTES
Date:1/3/2023  Patient: Mary Jane Gaona  : 1930  MTT:[id number]  CODE:                                                                        PCP:Rigo Persaud    Admit Date: 2022  5:50 PM   LOS: 3 days         Subjective: Patient seen and examined this a.m. resting in bed, in good spirits noticeably fatigued. Denying any pain, states she had a lot of fluid. Wanting to go home. No adverse events reported overnight. Denies pain, fevers or chills. Hospital course :Mrs. Mary Jane Gaona is a pleasant 80year old  american lady from home. She confirms that she has stopped taking her lasix at home as she didn't feel good after taking it. She had greatly swollen legs that have remained swollen despite aggressive IV Diuresis. She stated dyspnea to EMS who gave her lasix and as per the ED team she had great success with EMS as she had begun to rapidly diurese with them while en route. Cognitive evaluation noted to be within functional limits, but related to speech therapy for continued diuresis cumulative I's and O's greater than 6 L. Cardiologist with possibilities of the LifeVest placement. Repeat of a chest x-ray revealed mild cardiomegaly with prominence of bronchovascular markings in bilateral lungs. Echocardiogram resulted with an EF of 35%. Review of Systems  ROS: 14 point review of systems is negative except as specifically addressed above.     Objective:      Vital signs in last 24 hours:  Patient Vitals for the past 24 hrs:   BP Temp Temp src Pulse Resp SpO2   23 1125 (!) 128/56 97.2 °F (36.2 °C) Temporal 64 16 98 %   23 0807 -- -- -- -- -- 98 %   23 0631 124/77 97.3 °F (36.3 °C) Temporal 65 16 94 %   23 2219 (!) 112/55 97.3 °F (36.3 °C) Temporal 66 16 95 %   23 1714 (!) 132/53 97.2 °F (36.2 °C) Temporal 67 18 98 %         Patient examined with appropriate PPE  Physical Exam:  Vital Signs: BP (!) 128/56   Pulse 64   Temp 97.2 °F (36.2 °C) (Temporal)   Resp 16   Ht 5' (1.524 m)   Wt 117 lb 11.2 oz (53.4 kg)   SpO2 98%   BMI 22.99 kg/m²   General appearance:. Lying comfortably in bed ,   HEENT: Normocephalic , Atraumatic, PERRL, JVP not raised  Chest: Oxygen modalities in place, minor crackles appreciated upon auscultation, no expiratory wheezing. Cardiac: Regular rate and rhythm, S1, S2 normal. No murmurs, gallops, or rubs auscultated. Abdomen:soft, non-tender; normal bowel sounds, no masses, no organomegaly. Urogenital : no cain, no suprapubic tenderness, no CVA tenderness  Extremities: No clubbing or cyanosis. +2  peripheral edema over the dependent area and whole of the lower extremities. Peripheral pulses palpable.   Neurologic: Alert and Cooperative , cranial nerves grossly intact, DTR equal, Power  5/5   Psychology: No hallucination, no delusion, appropriate mood          Lab Review   Recent Results (from the past 24 hour(s))   Basic Metabolic Panel    Collection Time: 01/03/23  2:20 AM   Result Value Ref Range    Sodium 142 136 - 145 mmol/L    Potassium 3.2 (L) 3.5 - 5.0 mmol/L    Chloride 94 (L) 98 - 111 mmol/L    CO2 38 (H) 22 - 29 mmol/L    Anion Gap 10 7 - 19 mmol/L    Glucose 107 74 - 109 mg/dL    BUN 11 8 - 23 mg/dL    Creatinine 0.7 0.5 - 0.9 mg/dL    Est, Glom Filt Rate >60 >60    Calcium 8.7 8.2 - 9.6 mg/dL   Brain Natriuretic Peptide    Collection Time: 01/03/23  2:20 AM   Result Value Ref Range    Pro-BNP 5,826 (H) 0 - 1,800 pg/mL   CBC with Auto Differential    Collection Time: 01/03/23  2:20 AM   Result Value Ref Range    WBC 5.4 4.8 - 10.8 K/uL    RBC 3.84 (L) 4.20 - 5.40 M/uL    Hemoglobin 11.9 (L) 12.0 - 16.0 g/dL    Hematocrit 37.2 37.0 - 47.0 %    MCV 96.9 81.0 - 99.0 fL    MCH 31.0 27.0 - 31.0 pg    MCHC 32.0 (L) 33.0 - 37.0 g/dL    RDW 14.2 11.5 - 14.5 %    Platelets 427 678 - 178 K/uL    MPV 10.4 9.4 - 12.3 fL    Neutrophils % 47.6 (L) 50.0 - 65.0 %    Lymphocytes % 33.2 20.0 - 40.0 %    Monocytes % 13.1 (H) 0.0 - 10.0 %    Eosinophils % 5.5 (H) 0.0 - 5.0 %    Basophils % 0.4 0.0 - 1.0 %    Neutrophils Absolute 2.6 1.5 - 7.5 K/uL    Immature Granulocytes # 0.0 K/uL    Lymphocytes Absolute 1.8 1.1 - 4.5 K/uL    Monocytes Absolute 0.70 0.00 - 0.90 K/uL    Eosinophils Absolute 0.30 0.00 - 0.60 K/uL    Basophils Absolute 0.00 0.00 - 0.20 K/uL   Magnesium    Collection Time: 01/03/23  2:20 AM   Result Value Ref Range    Magnesium 1.7 1.7 - 2.3 mg/dL        I/O last 3 completed shifts: In: 411 [P.O.:620; I.V.:51]  Out: 6150 [Urine:6150]     potassium chloride  40 mEq Oral Once    vitamin B-12  1,000 mcg Oral Daily    aspirin  81 mg Oral Daily    carvedilol  3.125 mg Oral BID WC    atorvastatin  40 mg Oral Nightly    lisinopril  5 mg Oral Daily    sodium chloride flush  5-40 mL IntraVENous 2 times per day    enoxaparin  40 mg SubCUTAneous Daily          Assessment & Plan       CHF in Exacerbation due to Medication Noncompliance  Cumulative I's and O's greater than 6 L, high.   Patient's respiratory status is improving  Magnesium, phosphorus, TSH within normal limits  BNP 5, 826  Lipid panel reviewed  Troponin trend unremarkable continue with antihypertensive regimen  Patient continues on diuresis   Cardiology consultation and evaluation echocardiogram resulting in an EF of 35%  Plans for LifeVest  Continue with daily weights, strict I's and O's      Hypokalemia likely due to diuresis  Magnesium noted to be within normal limits  1/2 suppl continue with supplementation emented orally      Hyperlipidemia  Chronic condition, continue statin therapy     Generalized muscle weakness/at risk for malnutrition   appreciate nutritional evaluations, once optimized from cardiology standpoint will recommend PT/OT evaluation          DVT prophylaxis: Enoxaparin    POA  DNR   Case d/w the RN taking care of the patient  RN  notes reviewed  Consultant notes reviewed     DISPOSITION:    D/C: Home  Estimated D/C Date: TBD        EMR Dragon/Transcription disclaimer:   Much of this encounter note is an electronic transcription/translation of spoken language to printed text.  The electronic translation of spoken language may permit erroneous, or at times, nonsensical words or phrases to be inadvertently transcribed; although attempts have made to review the note for such errors, some may still exist.      Electronically signed by   Manuel Donaldson MD   Internal Medicine Hospitalist  On 1/3/2023  At 3:13 PM

## 2023-01-03 NOTE — PROGRESS NOTES
Cardiology Progress Note Blair Mahoney DO     I personally saw the patient and rounded with:  RN, on  1/1/23      The observations documented in this note, including the assessment and plan are mine          Date of Admission:  12/31/2022  5:50 PM    Date of Initially Being Seen / Consultation:  1/1/23    Cardiologist:  Heath Prakash 53 Attending: Dr. Yara Mishra     PCP:  Robinette Blizzard    Reason for Consultation or Admission / Chief Complaint:  sob and leg swelling    Progress note  Subjective:  Patient is seen and examined. No acute event overnight. SOB much better. Denies any active chest pain or palpitation. Cardiovascular review of systems otherwise negative. SUBJECTIVE AND HISTORY OF PRESENT ILLNESS:    Source of the history:  Patient, family, previous inpatient and outpatient records in Epic. Edy Don is a 80 y.o. female who presents to [facility] with chief complaint of leg swelling and sob due to medication noncompliance consulted to the cardiology team for further work-up.  + sob. Denies any chest pain. Patient denies any diaphoresis. Denies any palpitation. + leg swelling. Denies any focal weakness or numbness. Denies any hematemesis or melena or easy bruising or bleeding. Cardiovascular review of system is otherwise negative.       Family present:      CARDIAC RISK PROFILE:    Risk Factor Yes / No / Unknown       Gender Female   Cigarette Use No   Family History of Cardiovascular Disease No   Diabetes Mellitus no   Hypercholesteremia yes   Hypertension yes          Cardiac Specific Problems:    Specialty Problems          Cardiology Problems    PAD (peripheral artery disease) (HCC)        * (Principal) Systolic and diastolic CHF, acute on chronic (HCC)        Ischemic dilated cardiomyopathy (HCC)        Mixed hyperlipidemia        AAA (abdominal aortic aneurysm)        Chronic systolic congestive heart failure ([facility] [address] Descending thoracic aortic aneurysm        Hyperlipemia        Coronary artery disease involving native heart without angina pectoris        Hyperlipidemia             PRIOR CARDIAC PROBLEM LIST  (IF APPLICABLE):    CHF  CAD      Past Medical History:    Past Medical History:   Diagnosis Date    CAD (coronary artery disease) 06/12/2013    Cardiomyopathy ([facility] [address] 06/12/2013    EF 25%    CHF (congestive heart failure) ([facility] [address]     Hyperlipemia     Dr. Elías Andrews manages    Palliative care patient 01/02/2023    Status post coronary artery stent placement 06/12/2013    LAD         Past Surgical History:    Past Surgical History:   Procedure Laterality Date    APPENDECTOMY      BACK SURGERY      CARDIAC CATHETERIZATION  4/8/2013   [facility]    EF 20-25%    TONSILLECTOMY           Home Medications:   Prior to Admission medications    Medication Sig Start Date End Date Taking? Authorizing Provider   carvedilol (COREG) 3.125 MG tablet TAKE 1 TABLET BY MOUTH 2 TIMES DAILY (WITH MEALS) 1/19/18   IZA Almeida   lisinopril (PRINIVIL;ZESTRIL) 5 MG tablet Take 1 tablet by mouth daily 4/2/16   Jackie Ryan MD   pravastatin (PRAVACHOL) 40 MG tablet Take 40 mg by mouth daily. Historical Provider, MD   HYDROcodone-acetaminophen (NORCO) 5-325 MG per tablet Take 1 tablet by mouth every 6 hours as needed for Pain. Historical Provider, MD   furosemide (LASIX) 40 MG tablet Take 40 mg by mouth daily    Historical Provider, MD   aspirin 81 MG tablet Take 81 mg by mouth daily.     Historical Provider, MD        Facility Administered Medications:    vitamin B-12  1,000 mcg Oral Daily    aspirin  81 mg Oral Daily    carvedilol  3.125 mg Oral BID WC    atorvastatin  40 mg Oral Nightly    lisinopril  5 mg Oral Daily    sodium chloride flush  5-40 mL IntraVENous 2 times per day    enoxaparin  40 mg SubCUTAneous Daily       Allergies:  Codeine, Cortizone, Penicillins, and Sulfa antibiotics     Social History:       Social History Socioeconomic History    Marital status:      Spouse name: Not on file    Number of children: 1    Years of education: Not on file    Highest education level: Not on file   Occupational History    Occupation: worked in a factory   Tobacco Use    Smoking status: Never    Smokeless tobacco: Never   Vaping Use    Vaping Use: Never used   Substance and Sexual Activity    Alcohol use: Not Currently     Comment: \"Maybe a few drinks years ago but I don't drink anymore\"    Drug use: No    Sexual activity: Not on file     Comment: had a son   Other Topics Concern    Not on file   Social History Narrative    CODE STATUS: DNR    HEALTH CARE PROXY: either [place] of [place] or [place] [address] area    AMBULATES: uses a walker    DOMICILED: lives alone     Social Determinants of Health     Financial Resource Strain: Not on [employer] Insecurity: Not on file   Transportation Needs: Not on file   Physical Activity: Not on file   Stress: Not on file   Social Connections: Not on file   Intimate Partner Violence: Not on file   Housing Stability: Not on file       Family History:     Family History   Problem Relation Age of Onset    Cancer Sister          REVIEW OF SYSTEMS:     Constitutional: Negative. Eyes: Negative. Respiratory: Negative. Cardiac:   Gastrointestinal: Negative. Genitourinary: Negative. Musculoskeletal: Negative. Skin: Negative. Neurological:  negative  Hematological: Negative. Psychiatric/Behavioral: Negative. PHYSICAL EXAMINATION:     BP (!) 112/55   Pulse 66   Temp 97.3 °F (36.3 °C) (Temporal)   Resp 16   Ht 5' (1.524 m)   Wt 117 lb 11.2 oz (53.4 kg)   SpO2 95%   BMI 22.99 kg/m²     Vitals and nursing note reviewed. Constitutional:       Appearance:  well-developed. HENT:      Head: Normocephalic and atraumatic. Eyes:      General:         Right eye: No discharge. Left eye: No discharge.       Conjunctiva/sclera: Conjunctivae normal.      Pupils: Pupils are equal, round, and reactive to light. Cardiovascular:      Rate and Rhythm: Normal rate and regular rhythm. Heart sounds: No significant murmur. Pulmonary:      Effort: Pulmonary effort is normal. No respiratory distress. Breath sounds: Normal breath sounds. No wheezing or rales. Abdominal:      General: Bowel sounds are normal.      Palpations: Abdomen is soft. There is no mass. Tenderness: There is no abdominal tenderness. There is no guarding or rebound. Musculoskeletal:         General: No tenderness. Normal range of motion. Cervical back: Normal range of motion and neck supple. Skin:     General: Skin is warm and dry. Capillary Refill: Capillary refill takes less than 2 seconds. Neurological:      Mental Status:  alert and oriented to person, place, and time. Psychiatric:         Behavior: Behavior normal.         Thought Content:  Thought content normal.         Judgment: Judgment normal.     LABORATORY EVALUATION & TESTING:    I have personally reviewed and interpreted the results of the following diagnostic testing      EKG and or Telemetry:  which was personally reviewed me:      Troponin: Troponins are . the creatinine is     CBC:   Recent Labs     12/31/22 1755 01/01/23 0128 01/02/23  0346   WBC 11.1* 10.4 4.8   HGB 12.4 11.4* 11.1*   HCT 39.7 37.0 35.7*   MCV 99.0 99.7* 99.7*    127* 121*     BMP:   Recent Labs     12/31/22  1755 12/31/22  1849 01/01/23  0128 01/02/23  0346     --  142 142   K 4.1 3.7 3.9 3.2*     --  102 97*   CO2 26  --  27 33*   PHOS 3.7  --   --   --    BUN 14  --  15 13   CREATININE 0.8  --  0.8 0.8     Cardiac Enzymes:   Recent Labs     01/01/23  1117 01/01/23  1538 01/01/23  1906   TROPONINI <0.01 <0.01 <0.01     PT/INR:   Recent Labs     12/31/22 1755   PROTIME 14.4   INR 1.12     APTT:   Recent Labs     12/31/22 1755   APTT 26.8     Liver Profile:  Lab Results   Component Value Date/Time AST 14 12/31/2022 05:55 PM    ALT <5 12/31/2022 05:55 PM    BILITOT 1.1 12/31/2022 05:55 PM    ALKPHOS 75 12/31/2022 05:55 PM     Lab Results   Component Value Date/Time    CHOL 121 12/31/2022 05:55 PM    HDL 60 12/31/2022 05:55 PM    TRIG 99 12/31/2022 05:55 PM     TSH:  No results found for: TSH  UA:   Lab Results   Component Value Date/Time    COLORU YELLOW 01/01/2023 01:39 AM    PHUR 6.0 01/01/2023 01:39 AM    WBCUA 1 02/08/2022 04:35 PM    RBCUA 4 02/08/2022 04:35 PM    MUCUS 3+ 04/05/2013 05:50 PM    YEAST 2+ 04/05/2013 05:50 PM    BACTERIA NEGATIVE 02/08/2022 04:35 PM    CLARITYU Clear 01/01/2023 01:39 AM    SPECGRAV 1.008 01/01/2023 01:39 AM    LEUKOCYTESUR Negative 01/01/2023 01:39 AM    UROBILINOGEN 0.2 01/01/2023 01:39 AM    BILIRUBINUR Negative 01/01/2023 01:39 AM    BLOODU Negative 01/01/2023 01:39 AM    GLUCOSEU Negative 01/01/2023 01:39 AM     TTE:   Normal left ventricular size with severely reduced LV function and an  estimated ejection fraction of approximately 35%. Mild concentric left ventricular hypertrophy. Unable to accurately assess diastolic function due to mitral stenosis    Ballooned out, akinetic apex with all other wall segments being hypokinetic. No evidence of left ventricular mass or thrombus noted. Mild to Moderate mitral valve stenosis (mean pressure gradient of 7 mmHg),  trace mitral regurgitation. Moderately thickened and calcified aortic valve with moderately reduced  leaflet mobility. Individual aortic valve leaflets are not clearly visualized, appears to be   tricuspid though. Moderate aortic stenosis; no evidence of significant aortic regurgitation. The aortic valve area is 1.05 cm2 with a maximum gradient of 52 mmHg and a   mean gradient of 28 mmHg. Right ventricular systolic pressure of 36 mm Hg consistent with mild   pulmonary hypertension. Moderate to severely dilated left atrium. Floppy intra-atrial septum.    Normal intact intra-atrial septum was noted with no evidence of   significant intra-atrial communications by color flow Doppler or by   agitated saline study. Assessment:  Shortness of breath  H/O CAD/MI S/P stent in LAD  Acute on chronic combined systolic and diastolic heart failure with LVEF of 35%  Ballooned out apex may be indicative of Takotsubo cardiomyopathy   Moderate AS  Mild to moderate MS  Mild PHTN  Hypertension  Hyperlipidemia  Macrocytic Anemia      Plan:  Troponin negative   Pro BNP elevated  Continue to monitor on telemetry  Echocardiogram suggestive of EF of 35% with moderate aortic stenosis and mild to moderate mitral stenosis. Continue aspirin, statin, BB  Continue lasix, monitor BMP  Patient may benefit from LifeVest. Discussed the risk and benefits. Agreed to try initially but after discussing with MPOA, they are reluctant due to compliance issues because of her weakness and debility. Patient is DNR    I had a detailed discussion with the patient and / or family regarding the historical points, physical examination findings, and any diagnostic results supporting the admission diagnosis. The patient was educated on care and need for admission. questions were invited and answered. Patient and / or family shows understanding of admission information and agrees to follow. I have discussed the risks, benefits and options with the patient and her family. They appear to understand, have no questions, and wish to proceed. Discussed with patient and family and nursing. I greatly appreciate the opportunity and your confidence in allowing me to participate in the care of Mary Jane Gaona    Electronically signed by Astrid Gonzalez DO on 1/1/23   Interventional cardiologist, [facility] [place].

## 2023-01-03 NOTE — PROGRESS NOTES
Speech Language Pathology  Facility/Department: [facility] PROGRESSIVE CARE  Initial Speech/Language/Cognitive/Swallow Assessment    NAME: Rodney Patel  : 1930   MRN: [id number]  ADMISSION DATE: 2022  ADMITTING DIAGNOSIS: has Ischemic dilated cardiomyopathy ([address] Status post coronary artery stent placement; Mixed hyperlipidemia; CAP (community acquired pneumonia); Cholelithiasis; Diverticulosis; Hyperlipemia; Chronic systolic congestive heart failure ([facility] [address] Acute respiratory failure with hypoxia ([facility] [address] AAA (abdominal aortic aneurysm); Descending thoracic aortic aneurysm; Pneumonia of right lower lobe due to infectious organism; Calculus of gallbladder without cholecystitis without obstruction; Coronary artery disease involving native heart without angina pectoris; Diverticulosis of intestine without bleeding; Hyperlipidemia; Unstageable pressure ulcer of left heel ([facility] [address] Unstageable pressure ulcer of right heel (HCC); PAD (peripheral artery disease) ([facility] [address] Medically noncompliant; Systolic and diastolic CHF, acute on chronic [facility]); and Palliative care patient on their problem list.    Date of Eval: 1/3/2023   Evaluating Therapist: BRI Phillip    Pain:  No pain reported during assessment. Assessment:  Completed MINI MENTAL STATE EXAMINATION and patient obtained 27 out of 30 possible points, indicative of cognitive-linguistic skills within functional limits. Did transition to full assessment and patient exhibited slow processing, delayed auditory comprehension, delayed verbalizations, and decreased short-term memory. Also evaluated patient's swallowing function. Patient exhibited decreased oral prep of more solid consistencies as well as sluggish, inconsistently mildly decreased laryngeal elevation for swallow airway protection. Even so, no outward S/S penetration/aspiration was noted with any regular solid consistency trial or thin H2O presented during assessment this date.      At this time, would recommend continuation regular solid consistency with thin liquid. Thank you for this consult. Goals:  Short-term Goals  Timeframe for Short-term Goals: 1-2x/day for 3 days  Goal 1: Patient will tolerate regular solid consistency and thin liquids with min S/S penetration/aspiration during PO intake. Goal 2: Re-assessment of speech/language/cognitive functioning. Goal 3: Patient will complete oral motor exercises with 80% of opportunities and with provision of min cues/prompts. Goal 4: Patient will utilize tools/strategies to promote increased clarity of speech at word, phrase, and sentence level with 80% of opportunities and with provision of mod cues/prompts. Goal 5: Patient will complete attention and processing tasks with 80% accuracy and with provision of min cues/prompts. Goal 6: Patient will complete comprehension and verbalization tasks with 80% accuracy and with provision of min cues/prompts. Goal 7: Patient will complete memory functioning tasks with 75% accuracy and with provision of mod cues/prompts. Subjective:  Chart Reviewed: Yes  Patient assessed for rehabilitation services?: Yes  Family / Caregiver Present: No     Objective: Auditory Comprehension  Comprehension:  (While delayed, patient demonstrated ability to answer simple yes/no questions regarding immediate environment and current state of being at independent level. While delayed, patient demonstrated ability to follow simple 1, simple 2, and simple 3 step commands independently. While delays were noted, patient demonstrated ability to answer yes/no questions of increased complexity at independent level.)     Expression  Primary Mode of Expression:  (Confrontation naming of items in room was considered to be delayed.  Structured responsive speech and responses in natural conversation were considered to be moderately delayed but appropriate.)     Motor Speech:  (Patient exhibited decreased volume and slow, decreased lingual movements during verbalizations.)     Overall Orientation Status:  (Patient demonstrated ability to verbalize name, birthday, age, address, phone number, city, state, county, hospital, season, month, AMOR, date, and year at independent level.)     Memory:  (Patient demonstrated appropriate immediate memory with sequences of unrelated numbers/words set up to 5 items without repetitions provided. Patient demonstrated decreased short-term memory with 2 minute delay+distractions present; patient was 2/3 on MINI.)     Problem Solving:  (It is noted that during evaluation, patient demonstrated ability to verbalize current PCP and pharmacy at independent level. Patient demonstrated ability to verbalize conditions in which she takes medications independently. Patient demonstrated ability to verbalize appropriate simple solutions to situations that could occur during activities of daily living at independent level.)     Additional Assessment:   Assessed patient's swallowing function. With regular solid consistency trials presented by SLP, patient exhibited vertical jaw movement during oral prep. Oral transit of regular solid consistency varied from 1-3 seconds in length and min oral cavity residue was noted post swallows; residue cleared from the mouth with additional dry swallows. Oral transit of thin H2O trials, presented via straw by SLP, primarily measured 1 second in length. Laryngeal elevation during swallow initiation was considered to be sluggish and inconsistently mildly decreased for swallow airway protection. Even so, no outward S/S penetration/aspiration was observed with any regular solid consistency trial or thin H2O presented during evaluation this date. At this time, would recommend continuation regular solid consistency with thin liquids. Will continue to follow.     Electronically signed by BRI Vega on 1/3/2023 at 1:43 PM

## 2023-01-04 LAB
ANION GAP SERPL CALCULATED.3IONS-SCNC: 10 MMOL/L (ref 7–19)
BUN BLDV-MCNC: 12 MG/DL (ref 8–23)
CALCIUM SERPL-MCNC: 9.1 MG/DL (ref 8.2–9.6)
CHLORIDE BLD-SCNC: 91 MMOL/L (ref 98–111)
CO2: 38 MMOL/L (ref 22–29)
CREAT SERPL-MCNC: 0.8 MG/DL (ref 0.5–0.9)
GFR SERPL CREATININE-BSD FRML MDRD: >60 ML/MIN/{1.73_M2}
GLUCOSE BLD-MCNC: 99 MG/DL (ref 74–109)
HCT VFR BLD CALC: 40.4 % (ref 37–47)
HEMOGLOBIN: 12.8 G/DL (ref 12–16)
MAGNESIUM: 1.6 MG/DL (ref 1.7–2.3)
MCH RBC QN AUTO: 31.3 PG (ref 27–31)
MCHC RBC AUTO-ENTMCNC: 31.7 G/DL (ref 33–37)
MCV RBC AUTO: 98.8 FL (ref 81–99)
PDW BLD-RTO: 14.1 % (ref 11.5–14.5)
PLATELET # BLD: 144 K/UL (ref 130–400)
PMV BLD AUTO: 10.6 FL (ref 9.4–12.3)
POTASSIUM SERPL-SCNC: 3.7 MMOL/L (ref 3.5–5)
RBC # BLD: 4.09 M/UL (ref 4.2–5.4)
SODIUM BLD-SCNC: 139 MMOL/L (ref 136–145)
WBC # BLD: 6 K/UL (ref 4.8–10.8)

## 2023-01-04 PROCEDURE — 6370000000 HC RX 637 (ALT 250 FOR IP): Performed by: INTERNAL MEDICINE

## 2023-01-04 PROCEDURE — 97162 PT EVAL MOD COMPLEX 30 MIN: CPT

## 2023-01-04 PROCEDURE — 97116 GAIT TRAINING THERAPY: CPT

## 2023-01-04 PROCEDURE — 97530 THERAPEUTIC ACTIVITIES: CPT

## 2023-01-04 PROCEDURE — 99233 SBSQ HOSP IP/OBS HIGH 50: CPT | Performed by: INTERNAL MEDICINE

## 2023-01-04 PROCEDURE — 2140000000 HC CCU INTERMEDIATE R&B

## 2023-01-04 PROCEDURE — 2700000000 HC OXYGEN THERAPY PER DAY

## 2023-01-04 PROCEDURE — 80048 BASIC METABOLIC PNL TOTAL CA: CPT

## 2023-01-04 PROCEDURE — 6370000000 HC RX 637 (ALT 250 FOR IP): Performed by: HOSPITALIST

## 2023-01-04 PROCEDURE — 36415 COLL VENOUS BLD VENIPUNCTURE: CPT

## 2023-01-04 PROCEDURE — 94640 AIRWAY INHALATION TREATMENT: CPT

## 2023-01-04 PROCEDURE — 6360000002 HC RX W HCPCS: Performed by: HOSPITALIST

## 2023-01-04 PROCEDURE — 6370000000 HC RX 637 (ALT 250 FOR IP): Performed by: FAMILY MEDICINE

## 2023-01-04 PROCEDURE — 85027 COMPLETE CBC AUTOMATED: CPT

## 2023-01-04 PROCEDURE — 2580000003 HC RX 258: Performed by: HOSPITALIST

## 2023-01-04 PROCEDURE — 83735 ASSAY OF MAGNESIUM: CPT

## 2023-01-04 RX ORDER — IPRATROPIUM BROMIDE AND ALBUTEROL SULFATE 2.5; .5 MG/3ML; MG/3ML
1 SOLUTION RESPIRATORY (INHALATION)
Status: DISCONTINUED | OUTPATIENT
Start: 2023-01-04 | End: 2023-01-06 | Stop reason: HOSPADM

## 2023-01-04 RX ADMIN — IPRATROPIUM BROMIDE AND ALBUTEROL SULFATE 1 AMPULE: 2.5; .5 SOLUTION RESPIRATORY (INHALATION) at 19:06

## 2023-01-04 RX ADMIN — HYDROCODONE BITARTRATE AND ACETAMINOPHEN 1 TABLET: 5; 325 TABLET ORAL at 09:05

## 2023-01-04 RX ADMIN — CYANOCOBALAMIN TAB 500 MCG 1000 MCG: 500 TAB at 09:04

## 2023-01-04 RX ADMIN — IPRATROPIUM BROMIDE AND ALBUTEROL SULFATE 1 AMPULE: 2.5; .5 SOLUTION RESPIRATORY (INHALATION) at 14:29

## 2023-01-04 RX ADMIN — CARVEDILOL 3.12 MG: 3.12 TABLET, FILM COATED ORAL at 17:50

## 2023-01-04 RX ADMIN — SODIUM CHLORIDE, PRESERVATIVE FREE 10 ML: 5 INJECTION INTRAVENOUS at 19:58

## 2023-01-04 RX ADMIN — ATORVASTATIN CALCIUM 40 MG: 40 TABLET, FILM COATED ORAL at 19:58

## 2023-01-04 RX ADMIN — ENOXAPARIN SODIUM 40 MG: 100 INJECTION SUBCUTANEOUS at 17:50

## 2023-01-04 RX ADMIN — HYDROCODONE BITARTRATE AND ACETAMINOPHEN 1 TABLET: 5; 325 TABLET ORAL at 17:51

## 2023-01-04 RX ADMIN — LISINOPRIL 5 MG: 10 TABLET ORAL at 09:04

## 2023-01-04 RX ADMIN — Medication 5 MG: at 19:58

## 2023-01-04 RX ADMIN — CARVEDILOL 3.12 MG: 3.12 TABLET, FILM COATED ORAL at 09:04

## 2023-01-04 RX ADMIN — ASPIRIN 81 MG: 81 TABLET, CHEWABLE ORAL at 09:04

## 2023-01-04 ASSESSMENT — PAIN SCALES - GENERAL: PAINLEVEL_OUTOF10: 4

## 2023-01-04 ASSESSMENT — PAIN DESCRIPTION - LOCATION: LOCATION: OTHER (COMMENT)

## 2023-01-04 NOTE — PROGRESS NOTES
Cardiology Progress Note Nate Alvarenga DO     I personally saw the patient and rounded with:  RN, on  1/1/23      The observations documented in this note, including the assessment and plan are mine          Date of Admission:  12/31/2022  5:50 PM    Date of Initially Being Seen / Consultation:  1/1/23    Cardiologist:  Heath Nichols 53 Attending: Dr. Feliciano Daniel     PCP:  Adelia Pascual    Reason for Consultation or Admission / Chief Complaint:  sob and leg swelling    Progress note  Subjective:  Patient is seen and examined. No acute event overnight. SOB much better. Denies any active chest pain or palpitation. Cardiovascular review of systems otherwise negative. SUBJECTIVE AND HISTORY OF PRESENT ILLNESS:    Source of the history:  Patient, family, previous inpatient and outpatient records in [facility]. Delio Carroll is a 80 y.o. female who presents to [facility] with chief complaint of leg swelling and sob due to medication noncompliance consulted to the cardiology team for further work-up.  + sob. Denies any chest pain. Patient denies any diaphoresis. Denies any palpitation. + leg swelling. Denies any focal weakness or numbness. Denies any hematemesis or melena or easy bruising or bleeding. Cardiovascular review of system is otherwise negative.       Family present:      CARDIAC RISK PROFILE:    Risk Factor Yes / No / Unknown       Gender Female   Cigarette Use No   Family History of Cardiovascular Disease No   Diabetes Mellitus no   Hypercholesteremia yes   Hypertension yes          Cardiac Specific Problems:    Specialty Problems          Cardiology Problems    PAD (peripheral artery disease) ([facility])        * (Principal) Systolic and diastolic CHF, acute on chronic (HCC)        Ischemic dilated cardiomyopathy (HCC)        Mixed hyperlipidemia        AAA (abdominal aortic aneurysm)        Chronic systolic congestive heart failure ([facility] [address] Descending thoracic aortic aneurysm        Hyperlipemia        Coronary artery disease involving native heart without angina pectoris        Hyperlipidemia             PRIOR CARDIAC PROBLEM LIST  (IF APPLICABLE):    CHF  CAD      Past Medical History:    Past Medical History:   Diagnosis Date    CAD (coronary artery disease) 06/12/2013    Cardiomyopathy ([facility] [address] 06/12/2013    EF 25%    CHF (congestive heart failure) ([facility] [address]     Hyperlipemia     Dr. Krista Bhagat manages    Palliative care patient 01/02/2023    Status post coronary artery stent placement 06/12/2013    LAD         Past Surgical History:    Past Surgical History:   Procedure Laterality Date    APPENDECTOMY      BACK SURGERY      [place]    EF 20-25%    TONSILLECTOMY           Home Medications:   Prior to Admission medications    Medication Sig Start Date End Date Taking? Authorizing Provider   carvedilol (COREG) 3.125 MG tablet TAKE 1 TABLET BY MOUTH 2 TIMES DAILY (WITH MEALS) 1/19/18   IZA Kramer   lisinopril (PRINIVIL;ZESTRIL) 5 MG tablet Take 1 tablet by mouth daily 4/2/16   Akil Mackay MD   pravastatin (PRAVACHOL) 40 MG tablet Take 40 mg by mouth daily. Historical Provider, MD   HYDROcodone-acetaminophen (NORCO) 5-325 MG per tablet Take 1 tablet by mouth every 6 hours as needed for Pain. Historical Provider, MD   furosemide (LASIX) 40 MG tablet Take 40 mg by mouth daily    Historical Provider, MD   aspirin 81 MG tablet Take 81 mg by mouth daily.     Historical Provider, MD        Facility Administered Medications:    vitamin B-12  1,000 mcg Oral Daily    aspirin  81 mg Oral Daily    carvedilol  3.125 mg Oral BID WC    atorvastatin  40 mg Oral Nightly    lisinopril  5 mg Oral Daily    sodium chloride flush  5-40 mL IntraVENous 2 times per day    enoxaparin  40 mg SubCUTAneous Daily       Allergies:  Codeine, Cortizone, Penicillins, and Sulfa antibiotics     Social History:       Social History Socioeconomic History    Marital status:      Spouse name: Not on file    Number of children: 1    Years of education: Not on file    Highest education level: Not on file   Occupational History    Occupation: worked in a factory   Tobacco Use    Smoking status: Never    Smokeless tobacco: Never   Vaping Use    Vaping Use: Never used   Substance and Sexual Activity    Alcohol use: Not Currently     Comment: \"Maybe a few drinks years ago but I don't drink anymore\"    Drug use: No    Sexual activity: Not on file     Comment: had a son   Other Topics Concern    Not on file   Social History Narrative    CODE STATUS: DNR    HEALTH CARE PROXY: either Maureen Peter of [employer] [address] [place] [employer] [address] area    AMBULATES: uses a walker    DOMICILED: lives alone     Social Determinants of Health     Financial Resource Strain: Not on [employer] Insecurity: Not on file   Transportation Needs: Not on file   Physical Activity: Not on file   Stress: Not on file   Social Connections: Not on file   Intimate Partner Violence: Not on file   Housing Stability: Not on file       Family History:     Family History   Problem Relation Age of Onset    Cancer Sister          REVIEW OF SYSTEMS:     Constitutional: Negative. Eyes: Negative. Respiratory: Negative. Cardiac:   Gastrointestinal: Negative. Genitourinary: Negative. Musculoskeletal: Negative. Skin: Negative. Neurological:  negative  Hematological: Negative. Psychiatric/Behavioral: Negative. PHYSICAL EXAMINATION:     /63   Pulse 68   Temp 98 °F (36.7 °C) (Temporal)   Resp 16   Ht 5' (1.524 m)   Wt 117 lb 11.2 oz (53.4 kg)   SpO2 99%   BMI 22.99 kg/m²     Vitals and nursing note reviewed. Constitutional:       Appearance:  well-developed. HENT:      Head: Normocephalic and atraumatic. Eyes:      General:         Right eye: No discharge. Left eye: No discharge.       Conjunctiva/sclera: Conjunctivae normal. Pupils: Pupils are equal, round, and reactive to light. Cardiovascular:      Rate and Rhythm: Normal rate and regular rhythm. Heart sounds: No significant murmur. Pulmonary:      Effort: Pulmonary effort is normal. No respiratory distress. Breath sounds: Normal breath sounds. No wheezing or rales. Abdominal:      General: Bowel sounds are normal.      Palpations: Abdomen is soft. There is no mass. Tenderness: There is no abdominal tenderness. There is no guarding or rebound. Musculoskeletal:         General: No tenderness. Normal range of motion. Cervical back: Normal range of motion and neck supple. Skin:     General: Skin is warm and dry. Capillary Refill: Capillary refill takes less than 2 seconds. Neurological:      Mental Status:  alert and oriented to person, place, and time. Psychiatric:         Behavior: Behavior normal.         Thought Content: Thought content normal.         Judgment: Judgment normal.     LABORATORY EVALUATION & TESTING:    I have personally reviewed and interpreted the results of the following diagnostic testing      EKG and or Telemetry:  which was personally reviewed me:      Troponin: Troponins are . the creatinine is     CBC:   Recent Labs     01/01/23  0128 01/02/23  0346 01/03/23  0220   WBC 10.4 4.8 5.4   HGB 11.4* 11.1* 11.9*   HCT 37.0 35.7* 37.2   MCV 99.7* 99.7* 96.9   * 121* 133     BMP:   Recent Labs     01/01/23  0128 01/02/23  0346 01/03/23  0220    142 142   K 3.9 3.2* 3.2*    97* 94*   CO2 27 33* 38*   BUN 15 13 11   CREATININE 0.8 0.8 0.7     Cardiac Enzymes:   Recent Labs     01/01/23  1117 01/01/23  1538 01/01/23  1906   TROPONINI <0.01 <0.01 <0.01     PT/INR:   No results for input(s): PROTIME, INR in the last 72 hours. APTT:   No results for input(s): APTT in the last 72 hours.     Liver Profile:  Lab Results   Component Value Date/Time    AST 14 12/31/2022 05:55 PM    ALT <5 12/31/2022 05:55 PM    BILITOT 1.1 12/31/2022 05:55 PM    ALKPHOS 75 12/31/2022 05:55 PM     Lab Results   Component Value Date/Time    CHOL 121 12/31/2022 05:55 PM    HDL 60 12/31/2022 05:55 PM    TRIG 99 12/31/2022 05:55 PM     TSH:  No results found for: TSH  UA:   Lab Results   Component Value Date/Time    COLORU YELLOW 01/01/2023 01:39 AM    PHUR 6.0 01/01/2023 01:39 AM    WBCUA 1 02/08/2022 04:35 PM    RBCUA 4 02/08/2022 04:35 PM    MUCUS 3+ 04/05/2013 05:50 PM    YEAST 2+ 04/05/2013 05:50 PM    BACTERIA NEGATIVE 02/08/2022 04:35 PM    CLARITYU Clear 01/01/2023 01:39 AM    SPECGRAV 1.008 01/01/2023 01:39 AM    LEUKOCYTESUR Negative 01/01/2023 01:39 AM    UROBILINOGEN 0.2 01/01/2023 01:39 AM    BILIRUBINUR Negative 01/01/2023 01:39 AM    BLOODU Negative 01/01/2023 01:39 AM    GLUCOSEU Negative 01/01/2023 01:39 AM     TTE:   Normal left ventricular size with severely reduced LV function and an  estimated ejection fraction of approximately 35%. Mild concentric left ventricular hypertrophy. Unable to accurately assess diastolic function due to mitral stenosis    Ballooned out, akinetic apex with all other wall segments being hypokinetic. No evidence of left ventricular mass or thrombus noted. Mild to Moderate mitral valve stenosis (mean pressure gradient of 7 mmHg),  trace mitral regurgitation. Moderately thickened and calcified aortic valve with moderately reduced  leaflet mobility. Individual aortic valve leaflets are not clearly visualized, appears to be   tricuspid though. Moderate aortic stenosis; no evidence of significant aortic regurgitation. The aortic valve area is 1.05 cm2 with a maximum gradient of 52 mmHg and a   mean gradient of 28 mmHg. Right ventricular systolic pressure of 36 mm Hg consistent with mild   pulmonary hypertension. Moderate to severely dilated left atrium. Floppy intra-atrial septum.    Normal intact intra-atrial septum was noted with no evidence of   significant intra-atrial communications by color flow Doppler or by   agitated saline study. Assessment:  Shortness of breath  H/O CAD/MI S/P stent in LAD  Acute on chronic combined systolic and diastolic heart failure with LVEF of 35%  Ballooned out apex may be indicative of Takotsubo cardiomyopathy   Moderate AS  Mild to moderate MS  Mild PHTN  Hypertension  Hyperlipidemia  Macrocytic Anemia      Plan:  Troponin negative   Pro BNP elevated  Continue to monitor on telemetry  Echocardiogram suggestive of EF of 35% with moderate aortic stenosis and mild to moderate mitral stenosis. Continue aspirin, statin, BB  Continue lasix, monitor BMP  Patient may benefit from LifeVest. Discussed the risk and benefits. Agreed to try initially but after discussing with MPOA, they are reluctant due to compliance issues because of her weakness and debility. Patient is DNR    I had a detailed discussion with the patient and / or family regarding the historical points, physical examination findings, and any diagnostic results supporting the admission diagnosis. The patient was educated on care and need for admission. questions were invited and answered. Patient and / or family shows understanding of admission information and agrees to follow. I have discussed the risks, benefits and options with the patient and her family. They appear to understand, have no questions, and wish to proceed. Discussed with patient and family and nursing. I greatly appreciate the opportunity and your confidence in allowing me to participate in the care of Kurt Malone    Electronically signed by Boom Alonzo DO on 1/1/23   Interventional cardiologist, [facility].

## 2023-01-04 NOTE — CARE COORDINATION
This SW met with pt at bedside re: Assiatance with safe d/c planning: may need HHC with home PT/OT or may need placement. Pt states she is not interested in SNF placement. She states she lives alonewith lots of support. States she has been doing well walking around her room and believes she is near her baseline. Pt states she has a caregiver that comes to her home daily; she helps with cooking, cleaning, and other household chores. Informed her that it is recommended that she have 24/7 assist upon dc. She states she has friends that are staying with her for at least 2 nights. Support from niece as well. She has all DME needed. States she has had HH in the past and would like to have their services upon dc if possible. Denied any other needs.   Electronically signed by Marian Roque on 1/4/2023 at 4:02 PM

## 2023-01-04 NOTE — PROGRESS NOTES
Occupational Therapy     01/04/23 1300   Subjective   Subjective Pt in bed and required encouragement to participate. Pt states \"I am weak\". Pain Assessment   Pain Assessment None - Denies Pain   Vitals   O2 Device Nasal cannula   Cognition   Overall Cognitive Status Exceptions   Cognition Comment Repeats statements frequently. Requires cues to initiate tasks. Orientation   Overall Orientation Status WFL   Bed Mobility Training   Bed Mobility Training Yes   Overall Level of Assistance Minimum assistance   Interventions Verbal cues; Tactile cues   Supine to Sit Minimum assistance   Scooting Minimum assistance   Transfer Training   Transfer Training Yes   Overall Level of Assistance Minimum assistance   Interventions Verbal cues; Tactile cues;Manual cues   Sit to Stand Minimum assistance   Stand to Sit Minimum assistance   Bed to Chair Minimum assistance   Balance   Sitting With support  (BUEs)   Standing With support  (RW)   ADL   Feeding Setup;Supervision   Feeding Skilled Clinical Factors requires cutting up food due to weakness. Grooming Setup;Supervision   UE Bathing Minimal assistance   LE Bathing Minimal assistance   UE Dressing Modified independent ;Setup   LE Dressing Minimal assistance; Moderate assistance   Toileting Minimal assistance; Moderate assistance   Toileting Skilled Clinical Factors to manage clothing and clean up. Additional Comments using purewick currently, incontinent of urine each time standing   Assessment   Assessment Tx focused on sitting EOB, transfer at RW level to recliner and setting patient up for self feeding at lunch time.    Activity Tolerance Patient limited by fatigue;Patient limited by endurance   Discharge Recommendations Patient would benefit from continued therapy after discharge;24 hour supervision or assist   Occupational Therapy Plan   Times Per Week 3-5   Times Per Day Once a day   OT Plan of Care   Wednesday X   Electronically signed by ANYA Manzo on 1/4/2023 at 1:42 PM

## 2023-01-04 NOTE — PLAN OF CARE
Problem: Discharge Planning  Goal: Discharge to home or other facility with appropriate resources  Outcome: Progressing     Problem: Skin/Tissue Integrity  Goal: Absence of new skin breakdown  Description: 1. Monitor for areas of redness and/or skin breakdown  2. Assess vascular access sites hourly  3. Every 4-6 hours minimum:  Change oxygen saturation probe site  4. Every 4-6 hours:  If on nasal continuous positive airway pressure, respiratory therapy assess nares and determine need for appliance change or resting period.   Outcome: Progressing     Problem: Chronic Conditions and Co-morbidities  Goal: Patient's chronic conditions and co-morbidity symptoms are monitored and maintained or improved  Outcome: Progressing

## 2023-01-04 NOTE — PLAN OF CARE
Problem: Discharge Planning  Goal: Discharge to home or other facility with appropriate resources  1/3/2023 2343 by Stanley Bliss RN  Outcome: Progressing  1/3/2023 1831 by Pb Acosta RN  Outcome: Progressing     Problem: Skin/Tissue Integrity  Goal: Absence of new skin breakdown  Description: 1. Monitor for areas of redness and/or skin breakdown  2. Assess vascular access sites hourly  3. Every 4-6 hours minimum:  Change oxygen saturation probe site  4. Every 4-6 hours:  If on nasal continuous positive airway pressure, respiratory therapy assess nares and determine need for appliance change or resting period.   1/3/2023 2343 by Stanley Bliss RN  Outcome: Progressing  1/3/2023 1831 by Pb Acosta RN  Outcome: Progressing     Problem: Chronic Conditions and Co-morbidities  Goal: Patient's chronic conditions and co-morbidity symptoms are monitored and maintained or improved  1/3/2023 2343 by Stanley Bliss RN  Outcome: Progressing  1/3/2023 1831 by Pb Acosta RN  Outcome: Progressing     Problem: Pain  Goal: Verbalizes/displays adequate comfort level or baseline comfort level  Outcome: Progressing

## 2023-01-04 NOTE — PROGRESS NOTES
Physical Therapy  Facility/Department: [facility] PROGRESSIVE CARE  Physical Therapy Initial Assessment    Name: Fani Ramesh  : 1930  MRN: [id number]  Date of Service: 2023    Discharge Recommendations:  Continue to assess pending progress, 24 hour supervision or assist, Patient would benefit from continued therapy after discharge          Patient Diagnosis(es): The encounter diagnosis was Congestive heart failure, unspecified HF chronicity, unspecified heart failure type ([facility] [address] Past Medical History:  has a past medical history of CAD (coronary artery disease), Cardiomyopathy ([facility] [address] CHF (congestive heart failure) ([facility] [address] Hyperlipemia, Palliative care patient, and Status post coronary artery stent placement. Past Surgical History:  has a past surgical history that includes Cardiac catheterization (2013   [facility]); back surgery; Appendectomy; and Tonsillectomy. Assessment   Body Structures, Functions, Activity Limitations Requiring Skilled Therapeutic Intervention: Decreased functional mobility ; Decreased strength;Decreased balance;Decreased safe awareness;Decreased cognition;Decreased endurance  Assessment: Pt. will benefit from cont. PT to decrease impairments. Pt. is in a deconditioned state and is a fall risk and should not attempt mobility on her own at this time. Pt's BP needs to be monitored with mobility. BP taken after amb. 95/55 and pt feeling dizzy. Anticipate pt will need 24 hr care upon d/c from [facility]. Encouraged pt to sit up in the chair and will work on progressive mobility.   Treatment Diagnosis: debility  Therapy Prognosis: Fair  Decision Making: Medium Complexity  Requires PT Follow-Up: Yes  Activity Tolerance  Activity Tolerance: Patient limited by fatigue;Patient limited by endurance     Plan   Physcial Therapy Plan  General Plan: 6-7 times per week  Current Treatment Recommendations: Strengthening, ROM, Balance training, Functional mobility training, Transfer training, Gait training, Endurance training, Positioning, Equipment evaluation, education, & procurement, Therapeutic activities, Patient/Caregiver education & training, Safety education & training  Safety Devices  Type of Devices: Call light within reach, Chair alarm in place, Heels elevated for pressure relief, Patient at risk for falls, Nurse notified, Gait belt, Left in chair (pt left wtih PCA)     Restrictions  Restrictions/Precautions  Restrictions/Precautions: Fall Risk  Required Braces or Orthoses?: No     Subjective   Pain: none  General  Chart Reviewed: Yes  Patient assessed for rehabilitation services?: Yes  Response To Previous Treatment: Not applicable  Family / Caregiver Present: No  Referring Practitioner: Renzo Walter MD  Referral Date : 01/01/23  Diagnosis: CHF  Follows Commands: Within Functional Limits  General Comment  Comments: RN, Lupe, dennise PT. Subjective  Subjective: Pt. willing to walk, states she feels weak. Social/Functional History  Social/Functional History  Lives With: Alone (with support)  Home Layout: One level  Bathroom Shower/Tub: Tub/Shower unit  Home Equipment: Theador Corpus, 4 wheeled  ADL Assistance: Independent  Ambulation Assistance: Independent  Transfer Assistance: Independent  Vision/Hearing  Vision  Vision: Within Functional Limits  Hearing  Hearing: Within functional limits    Cognition   Orientation  Overall Orientation Status: Within Functional Limits  Cognition  Overall Cognitive Status: Exceptions  Following Commands: Follows one step commands with increased time; Follows one step commands with repetition  Attention Span: Attends with cues to redirect  Safety Judgement: Decreased awareness of need for assistance;Decreased awareness of need for safety  Problem Solving: Assistance required to generate solutions;Assistance required to implement solutions  Insights: Decreased awareness of deficits  Initiation: Requires cues for all  Sequencing: Requires cues for all  Cognition Comment: Repeats statements frequently. Requires cues to initiate tasks. Objective   Heart Rate: 68  Heart Rate Source: Monitor  BP: (!) 95/55  MAP (Calculated): 68  Resp: 18  SpO2: 91 % (after amb. and pt on RA when O2 sat taken, O2 by nc placed back on pt)  O2 Device: Nasal cannula     Observation/Palpation  Posture: Fair  Observation: pt on 2LO2 prior to PT and after, telemetry, L heel wound        AROM RLE (degrees)  RLE AROM: WFL  AROM LLE (degrees)  LLE AROM : WFL  Strength RLE  Strength RLE: Exception  Comment: grossly 3+/5  Strength LLE  Strength LLE: Exception  Comment: grossly 3+/5        Bed Mobility Training  Bed Mobility Training: Yes  Overall Level of Assistance: Minimum assistance  Interventions: Verbal cues; Tactile cues  Supine to Sit: Minimum assistance  Scooting: Minimum assistance  Balance  Sitting: With support (BUEs)  Standing: With support (RW)  Transfer Training  Transfer Training: Yes  Overall Level of Assistance: Minimum assistance  Interventions: Verbal cues; Tactile cues;Manual cues  Sit to Stand: Minimum assistance  Stand to Sit: Minimum assistance  Bed to Chair: Minimum assistance  Bed mobility  Rolling to Right: Unable to assess  Supine to Sit: Unable to assess  Sit to Supine: Unable to assess  Bed Mobility Comments: pt already up in the chair  Transfers  Sit to Stand: Minimal Assistance  Stand to Sit: Minimal Assistance  Bed to Chair: Minimal assistance  Comment: posterior lean, bed to [facility]  Ambulation  Surface: Level tile  Device: Rolling Walker  Other Apparatus: Wheelchair follow  Assistance: Minimal assistance  Quality of Gait: a little weak  Gait Deviations: Slow Fely;Decreased step length;Decreased step height  Distance: 2', 20' x 2  Comments: seated rest break     Balance  Posture: Good  Sitting - Static: Good;-  Sitting - Dynamic: Fair;+  Standing - Static: Fair;-  Standing - Dynamic: Poor;+           OutComes Score                                                  AM-PAC Score Tinneti Score       Goals  Short Term Goals  Time Frame for Short Term Goals: 2 wks  Short Term Goal 1: supine to sit indep  Short Term Goal 2: sit to stand indep  Short Term Goal 3: amb. 200' with RW SBA  Patient Goals   Patient Goals : go home       Education  Patient Education  Education Given To: Patient  Education Provided: Transfer Training;Plan of Care;Role of Therapy  Education Provided Comments: use of call light, staff A  Education Method: Verbal  Barriers to Learning: Cognition  Education Outcome: Verbalized understanding;Continued education needed      Therapy Time   Individual Concurrent Group Co-treatment   Time In           Time Out           Minutes                   Parisa Brizuela PT   Electronically signed by Parisa Brizuela PT on 1/4/2023 at 2:02 PM

## 2023-01-05 LAB
ANION GAP SERPL CALCULATED.3IONS-SCNC: 10 MMOL/L (ref 7–19)
BLOOD CULTURE, ROUTINE: NORMAL
BUN BLDV-MCNC: 20 MG/DL (ref 8–23)
CALCIUM SERPL-MCNC: 8.6 MG/DL (ref 8.2–9.6)
CHLORIDE BLD-SCNC: 90 MMOL/L (ref 98–111)
CO2: 35 MMOL/L (ref 22–29)
CREAT SERPL-MCNC: 1.3 MG/DL (ref 0.5–0.9)
CREATININE URINE: 140.7 MG/DL (ref 4.2–622)
CULTURE, BLOOD 2: NORMAL
GFR SERPL CREATININE-BSD FRML MDRD: 38 ML/MIN/{1.73_M2}
GLUCOSE BLD-MCNC: 91 MG/DL (ref 74–109)
MAGNESIUM: 1.3 MG/DL (ref 1.7–2.3)
POTASSIUM SERPL-SCNC: 4.1 MMOL/L (ref 3.5–5)
SODIUM BLD-SCNC: 135 MMOL/L (ref 136–145)
SODIUM URINE: 25 MMOL/L
UREA NITROGEN, UR: 404 MG/DL

## 2023-01-05 PROCEDURE — 82570 ASSAY OF URINE CREATININE: CPT

## 2023-01-05 PROCEDURE — 84540 ASSAY OF URINE/UREA-N: CPT

## 2023-01-05 PROCEDURE — 2700000000 HC OXYGEN THERAPY PER DAY

## 2023-01-05 PROCEDURE — 2140000000 HC CCU INTERMEDIATE R&B

## 2023-01-05 PROCEDURE — 99232 SBSQ HOSP IP/OBS MODERATE 35: CPT | Performed by: INTERNAL MEDICINE

## 2023-01-05 PROCEDURE — 6370000000 HC RX 637 (ALT 250 FOR IP): Performed by: INTERNAL MEDICINE

## 2023-01-05 PROCEDURE — 51798 US URINE CAPACITY MEASURE: CPT

## 2023-01-05 PROCEDURE — 84300 ASSAY OF URINE SODIUM: CPT

## 2023-01-05 PROCEDURE — 6370000000 HC RX 637 (ALT 250 FOR IP): Performed by: HOSPITALIST

## 2023-01-05 PROCEDURE — 97110 THERAPEUTIC EXERCISES: CPT

## 2023-01-05 PROCEDURE — 97116 GAIT TRAINING THERAPY: CPT

## 2023-01-05 PROCEDURE — 83735 ASSAY OF MAGNESIUM: CPT

## 2023-01-05 PROCEDURE — 6360000002 HC RX W HCPCS: Performed by: HOSPITALIST

## 2023-01-05 PROCEDURE — 2580000003 HC RX 258: Performed by: HOSPITALIST

## 2023-01-05 PROCEDURE — 36415 COLL VENOUS BLD VENIPUNCTURE: CPT

## 2023-01-05 PROCEDURE — 80048 BASIC METABOLIC PNL TOTAL CA: CPT

## 2023-01-05 PROCEDURE — 97530 THERAPEUTIC ACTIVITIES: CPT

## 2023-01-05 PROCEDURE — 94760 N-INVAS EAR/PLS OXIMETRY 1: CPT

## 2023-01-05 PROCEDURE — 2580000003 HC RX 258: Performed by: INTERNAL MEDICINE

## 2023-01-05 PROCEDURE — 94640 AIRWAY INHALATION TREATMENT: CPT

## 2023-01-05 RX ORDER — ENOXAPARIN SODIUM 100 MG/ML
30 INJECTION SUBCUTANEOUS DAILY
Status: DISCONTINUED | OUTPATIENT
Start: 2023-01-05 | End: 2023-01-06 | Stop reason: HOSPADM

## 2023-01-05 RX ORDER — SODIUM CHLORIDE 9 MG/ML
INJECTION, SOLUTION INTRAVENOUS CONTINUOUS
Status: ACTIVE | OUTPATIENT
Start: 2023-01-05 | End: 2023-01-05

## 2023-01-05 RX ADMIN — IPRATROPIUM BROMIDE AND ALBUTEROL SULFATE 1 AMPULE: 2.5; .5 SOLUTION RESPIRATORY (INHALATION) at 19:30

## 2023-01-05 RX ADMIN — ENOXAPARIN SODIUM 30 MG: 100 INJECTION SUBCUTANEOUS at 17:41

## 2023-01-05 RX ADMIN — IPRATROPIUM BROMIDE AND ALBUTEROL SULFATE 1 AMPULE: 2.5; .5 SOLUTION RESPIRATORY (INHALATION) at 07:17

## 2023-01-05 RX ADMIN — SODIUM CHLORIDE: 9 INJECTION, SOLUTION INTRAVENOUS at 09:56

## 2023-01-05 RX ADMIN — CYANOCOBALAMIN TAB 500 MCG 1000 MCG: 500 TAB at 08:34

## 2023-01-05 RX ADMIN — CARVEDILOL 3.12 MG: 3.12 TABLET, FILM COATED ORAL at 08:34

## 2023-01-05 RX ADMIN — SODIUM CHLORIDE, PRESERVATIVE FREE 10 ML: 5 INJECTION INTRAVENOUS at 20:06

## 2023-01-05 RX ADMIN — SODIUM CHLORIDE, PRESERVATIVE FREE 10 ML: 5 INJECTION INTRAVENOUS at 08:34

## 2023-01-05 RX ADMIN — ASPIRIN 81 MG: 81 TABLET, CHEWABLE ORAL at 08:34

## 2023-01-05 RX ADMIN — CARVEDILOL 3.12 MG: 3.12 TABLET, FILM COATED ORAL at 16:37

## 2023-01-05 RX ADMIN — LISINOPRIL 5 MG: 10 TABLET ORAL at 08:34

## 2023-01-05 RX ADMIN — IPRATROPIUM BROMIDE AND ALBUTEROL SULFATE 1 AMPULE: 2.5; .5 SOLUTION RESPIRATORY (INHALATION) at 14:57

## 2023-01-05 RX ADMIN — ATORVASTATIN CALCIUM 40 MG: 40 TABLET, FILM COATED ORAL at 20:05

## 2023-01-05 RX ADMIN — IPRATROPIUM BROMIDE AND ALBUTEROL SULFATE 1 AMPULE: 2.5; .5 SOLUTION RESPIRATORY (INHALATION) at 10:38

## 2023-01-05 ASSESSMENT — PAIN SCALES - GENERAL: PAINLEVEL_OUTOF10: 0

## 2023-01-05 NOTE — PROGRESS NOTES
Automatic Dose Adjustment of                Subcutaneous Anticoagulant for Prophylaxis    Mary Jane Gaona is a 80 y.o. female. Recent Labs     01/04/23  0242 01/05/23  0209   CREATININE 0.8 1.3*       Estimated Creatinine Clearance: 20 mL/min (A) (based on SCr of 1.3 mg/dL (H)). Weight:  Wt Readings from Last 1 Encounters:   01/02/23 117 lb 11.2 oz (53.4 kg)           Pharmacy has adjusted subcutaneous anticoagulant for prophylaxis to Enoxaparin 30 mg SC daily based on the patient's weight and estimated CrCl per [facility] policy.                Electronically signed by MUNA Wharton [facility] on 1/5/2023 at 7:44 AM

## 2023-01-05 NOTE — PROGRESS NOTES
Patient has not urinate during our shift. Bladder scan showed only 147 ml of urine in the bladder . Patient is alert and oriented at this time. Will pass it on during our bedside report.

## 2023-01-05 NOTE — PROGRESS NOTES
01/05/23 1100   Subjective   Subjective I am just so cold. Pain Assessment   Pain Assessment None - Denies Pain   Vitals   O2 Device Nasal cannula   Bed Mobility Training   Bed Mobility Training Yes   Overall Level of Assistance Minimum assistance   Supine to Sit Minimum assistance   Scooting Maximum assistance  (To EOB)   Transfer Training   Transfer Training Yes   Sit to Stand Contact-guard assistance   Stand to Sit Contact-guard assistance   Gait Training   Gait Training Yes   Gait   Overall Level of Assistance Contact-guard assistance   Speed/Fely Slow   Distance (ft) 35 Feet   Assistive Device Walker, rolling   PT Exercises   A/AROM Exercises 10 Reps BL LE EX. Other Specialty Interventions   Other Treatments/Modalities Patient required ex tra time. No blankets on PCU.   No Large gloves on room;patient in chair all needs in reach;personal alarm attached;   PT Plan of Care   Thursday X         Electronically signed by Petr Carlisle PTA on 1/5/2023 at 11:55 AM

## 2023-01-05 NOTE — PROGRESS NOTES
Date:2023  Patient: Delio Carroll  : 1930  DJY:[id number]  CODE:                                                                        PCP:Rigo Persaud    Admit Date: 2022  5:50 PM   LOS: 4 days         Subjective:   1/3 patient seen and examined this a.m. resting in bed, in good spirits noticeably fatigued. Denying any pain, states she had a lot of fluid. Wanting to go home. No adverse events reported overnight. Denies pain, fevers or chills.  seen and evaluated at bedside the peripheral edema is cleared up, discussed with RN at bedside discontinue Lasix drip, presently having wheezing we will start on DuoNeb likely undiagnosed COPD    Hospital course :Mrs. Delio Carroll is a pleasant 80year old  american lady from home. She confirms that she has stopped taking her lasix at home as she didn't feel good after taking it. She had greatly swollen legs that have remained swollen despite aggressive IV Diuresis. She stated dyspnea to EMS who gave her lasix and as per the ED team she had great success with EMS as she had begun to rapidly diurese with them while en route. Cognitive evaluation noted to be within functional limits, but related to speech therapy for continued diuresis cumulative I's and O's greater than 6 L. Cardiologist with possibilities of the LifeVest placement. Repeat of a chest x-ray revealed mild cardiomegaly with prominence of bronchovascular markings in bilateral lungs. Echocardiogram resulted with an EF of 35%. Review of Systems  ROS: 14 point review of systems is negative except as specifically addressed above.     Objective:      Vital signs in last 24 hours:  Patient Vitals for the past 24 hrs:   BP Temp Temp src Pulse Resp SpO2   23 1755 123/87 97.9 °F (36.6 °C) Oral 73 20 99 %   23 1351 (!) 95/55 -- -- -- -- 91 %   23 1347 (!) 95/55 97.5 °F (36.4 °C) Oral 68 18 91 %   23 0940 119/65 98.2 °F (36.8 °C) Oral 71 16 97 % 01/04/23 0548 (!) 134/59 97.8 °F (36.6 °C) Temporal 70 16 96 %   01/04/23 0252 (!) 140/67 97 °F (36.1 °C) Temporal 71 18 96 %   01/03/23 2259 120/63 98 °F (36.7 °C) Temporal 68 16 99 %   01/03/23 2051 -- -- -- 71 -- --         Patient examined with appropriate PPE  Physical Exam:  Vital Signs: /87   Pulse 73   Temp 97.9 °F (36.6 °C) (Oral)   Resp 20   Ht 5' (1.524 m)   Wt 117 lb 11.2 oz (53.4 kg)   SpO2 99%   BMI 22.99 kg/m²   General appearance:. Lying comfortably in bed ,   HEENT: Normocephalic , Atraumatic, PERRL, JVP not raised  Chest: On auscultation expiratory wheezing bilaterally, on 2 L oxygen by nasal cannula at baseline  Cardiac: Regular rate and rhythm, S1, S2 normal. No murmurs, gallops, or rubs auscultated. Abdomen:soft, non-tender; normal bowel sounds, no masses, no organomegaly. Urogenital : no cain, no suprapubic tenderness, no CVA tenderness  Extremities: No clubbing or cyanosis. no  peripheral edema over the dependent area and whole of the lower extremities. Peripheral pulses palpable.   Neurologic: Alert and Cooperative , cranial nerves grossly intact, DTR equal, Power  5/5   Psychology: No hallucination, no delusion, appropriate mood          Lab Review   Recent Results (from the past 24 hour(s))   Basic Metabolic Panel    Collection Time: 01/04/23  2:42 AM   Result Value Ref Range    Sodium 139 136 - 145 mmol/L    Potassium 3.7 3.5 - 5.0 mmol/L    Chloride 91 (L) 98 - 111 mmol/L    CO2 38 (H) 22 - 29 mmol/L    Anion Gap 10 7 - 19 mmol/L    Glucose 99 74 - 109 mg/dL    BUN 12 8 - 23 mg/dL    Creatinine 0.8 0.5 - 0.9 mg/dL    Est, Glom Filt Rate >60 >60    Calcium 9.1 8.2 - 9.6 mg/dL   Magnesium    Collection Time: 01/04/23  2:42 AM   Result Value Ref Range    Magnesium 1.6 (L) 1.7 - 2.3 mg/dL   CBC    Collection Time: 01/04/23  2:42 AM   Result Value Ref Range    WBC 6.0 4.8 - 10.8 K/uL    RBC 4.09 (L) 4.20 - 5.40 M/uL    Hemoglobin 12.8 12.0 - 16.0 g/dL    Hematocrit 40.4 37.0 - 47.0 %    MCV 98.8 81.0 - 99.0 fL    MCH 31.3 (H) 27.0 - 31.0 pg    MCHC 31.7 (L) 33.0 - 37.0 g/dL    RDW 14.1 11.5 - 14.5 %    Platelets 667 931 - 401 K/uL    MPV 10.6 9.4 - 12.3 fL        I/O last 3 completed shifts: In: 487.8 [P.O.:180; I.V.:307.8]  Out: 3900 [Urine:3900]     ipratropium-albuterol  1 ampule Inhalation Q4H WA    vitamin B-12  1,000 mcg Oral Daily    aspirin  81 mg Oral Daily    carvedilol  3.125 mg Oral BID WC    atorvastatin  40 mg Oral Nightly    lisinopril  5 mg Oral Daily    sodium chloride flush  5-40 mL IntraVENous 2 times per day    enoxaparin  40 mg SubCUTAneous Daily          Assessment & Plan       CHF in Exacerbation due to Medication Noncompliance  Cumulative I's and O's greater than 6 L, high.   Patient's respiratory status is improving  Magnesium, phosphorus, TSH within normal limits  BNP 5, 826> trending down  Lipid panel reviewed  Troponin trend unremarkable continue with antihypertensive regimen  Patient continues on diuresis   Cardiology consultation and evaluation echocardiogram resulting in an EF of 35%  Plans for LifeVest  Continue with daily weights, strict I's and O's  1/4 intake and output reviewed 2.8 L of urine output over the last 24-hour peripheral edema has subsides> discontinue Lasix drip    Hypokalemia likely due to diuresis  Magnesium noted to be within normal limits  1/2 suppl continue with supplementation emented orally    Hyperlipidemia  Chronic condition, continue statin therapy     Generalized muscle weakness/at risk for malnutrition   appreciate nutritional evaluations, once optimized from cardiology standpoint will recommend PT/OT evaluation    New diagnosis of COPD  1/4 will start on DuoNeb every 4 hourly and monitor clinically    DVT prophylaxis: Enoxaparin    POA  DNR   Case d/w the RN taking care of the patient  RN  notes reviewed  Consultant notes reviewed     DISPOSITION:    D/C: Home  Estimated D/C Date: TBD        EMR Dragon/Transcription disclaimer: Much of this encounter note is an electronic transcription/translation of spoken language to printed text.  The electronic translation of spoken language may permit erroneous, or at times, nonsensical words or phrases to be inadvertently transcribed; although attempts have made to review the note for such errors, some may still exist.      Electronically signed by   Jasmyne Abel MD   Internal Medicine Hospitalist  On 1/4/2023  At 6:32 PM

## 2023-01-05 NOTE — CONSULTS
Nurse met with patient re: referral to CHF clinic. Discussed with patient diet, activity, medications, definition of heart failure and s/s, daily wt monitoring with reporting of wt gain of >2-3 lbs in 24 hours or > 5 lbs in one week, BP/HR and activity monitoring with use of daily log, f/u appointments with PCP, CHF clinic and cardiologist. Discussed vaccine use, testing, monitoring of BS. HF packet given. Patient stated she does have a wt scale and BP monitor at home. She listened and rambled a lot but did seem to have comprehension she just doesn't care. She lives alone son that did live with her . She had 7 children , 3 have passed before her. She states she has people that come and check in on her. She is not willing to do weights. Just does not see the need. She voiced understanding of follow up for CHF at the office on 23 and states she has a friend that will drive her. Information has been given but compliance might be a bit more of a challenge. Time spent with patient 30 minutes.

## 2023-01-05 NOTE — PLAN OF CARE
Problem: Discharge Planning  Goal: Discharge to home or other facility with appropriate resources  Outcome: Progressing  Flowsheets (Taken 1/4/2023 1958)  Discharge to home or other facility with appropriate resources:   Identify barriers to discharge with patient and caregiver   Arrange for needed discharge resources and transportation as appropriate     Problem: Skin/Tissue Integrity  Goal: Absence of new skin breakdown  Description: 1. Monitor for areas of redness and/or skin breakdown  2. Assess vascular access sites hourly  3. Every 4-6 hours minimum:  Change oxygen saturation probe site  4. Every 4-6 hours:  If on nasal continuous positive airway pressure, respiratory therapy assess nares and determine need for appliance change or resting period.   Outcome: Progressing     Problem: Chronic Conditions and Co-morbidities  Goal: Patient's chronic conditions and co-morbidity symptoms are monitored and maintained or improved  Outcome: Progressing  Flowsheets (Taken 1/4/2023 1958)  Care Plan - Patient's Chronic Conditions and Co-Morbidity Symptoms are Monitored and Maintained or Improved:   Monitor and assess patient's chronic conditions and comorbid symptoms for stability, deterioration, or improvement   Collaborate with multidisciplinary team to address chronic and comorbid conditions and prevent exacerbation or deterioration   Update acute care plan with appropriate goals if chronic or comorbid symptoms are exacerbated and prevent overall improvement and discharge     Problem: Pain  Goal: Verbalizes/displays adequate comfort level or baseline comfort level  Outcome: Progressing

## 2023-01-06 VITALS
SYSTOLIC BLOOD PRESSURE: 116 MMHG | RESPIRATION RATE: 18 BRPM | WEIGHT: 122.7 LBS | TEMPERATURE: 97.7 F | HEART RATE: 84 BPM | BODY MASS INDEX: 24.09 KG/M2 | DIASTOLIC BLOOD PRESSURE: 54 MMHG | OXYGEN SATURATION: 94 % | HEIGHT: 60 IN

## 2023-01-06 LAB
ANION GAP SERPL CALCULATED.3IONS-SCNC: 8 MMOL/L (ref 7–19)
BUN BLDV-MCNC: 15 MG/DL (ref 8–23)
CALCIUM SERPL-MCNC: 8.5 MG/DL (ref 8.2–9.6)
CHLORIDE BLD-SCNC: 96 MMOL/L (ref 98–111)
CO2: 34 MMOL/L (ref 22–29)
CREAT SERPL-MCNC: 0.7 MG/DL (ref 0.5–0.9)
GFR SERPL CREATININE-BSD FRML MDRD: >60 ML/MIN/{1.73_M2}
GLUCOSE BLD-MCNC: 132 MG/DL (ref 74–109)
MAGNESIUM: 1.5 MG/DL (ref 1.7–2.3)
POTASSIUM REFLEX MAGNESIUM: 3.4 MMOL/L (ref 3.5–5)
PRO-BNP: 1881 PG/ML (ref 0–1800)
SODIUM BLD-SCNC: 138 MMOL/L (ref 136–145)

## 2023-01-06 PROCEDURE — 97535 SELF CARE MNGMENT TRAINING: CPT

## 2023-01-06 PROCEDURE — 97530 THERAPEUTIC ACTIVITIES: CPT

## 2023-01-06 PROCEDURE — 6370000000 HC RX 637 (ALT 250 FOR IP): Performed by: INTERNAL MEDICINE

## 2023-01-06 PROCEDURE — 6370000000 HC RX 637 (ALT 250 FOR IP): Performed by: FAMILY MEDICINE

## 2023-01-06 PROCEDURE — 94150 VITAL CAPACITY TEST: CPT

## 2023-01-06 PROCEDURE — 97116 GAIT TRAINING THERAPY: CPT

## 2023-01-06 PROCEDURE — 99233 SBSQ HOSP IP/OBS HIGH 50: CPT | Performed by: INTERNAL MEDICINE

## 2023-01-06 PROCEDURE — 83880 ASSAY OF NATRIURETIC PEPTIDE: CPT

## 2023-01-06 PROCEDURE — 36415 COLL VENOUS BLD VENIPUNCTURE: CPT

## 2023-01-06 PROCEDURE — 94760 N-INVAS EAR/PLS OXIMETRY 1: CPT

## 2023-01-06 PROCEDURE — 2580000003 HC RX 258: Performed by: HOSPITALIST

## 2023-01-06 PROCEDURE — 80048 BASIC METABOLIC PNL TOTAL CA: CPT

## 2023-01-06 PROCEDURE — 94640 AIRWAY INHALATION TREATMENT: CPT

## 2023-01-06 PROCEDURE — 6370000000 HC RX 637 (ALT 250 FOR IP): Performed by: HOSPITALIST

## 2023-01-06 PROCEDURE — 83735 ASSAY OF MAGNESIUM: CPT

## 2023-01-06 RX ORDER — POLYETHYLENE GLYCOL 3350 17 G/17G
17 POWDER, FOR SOLUTION ORAL DAILY PRN
Qty: 527 G | Refills: 1 | Status: SHIPPED | OUTPATIENT
Start: 2023-01-06 | End: 2023-02-05

## 2023-01-06 RX ORDER — IPRATROPIUM BROMIDE AND ALBUTEROL SULFATE 2.5; .5 MG/3ML; MG/3ML
3 SOLUTION RESPIRATORY (INHALATION) EVERY 6 HOURS
Qty: 360 ML | Refills: 0 | Status: SHIPPED | OUTPATIENT
Start: 2023-01-06

## 2023-01-06 RX ORDER — POTASSIUM CHLORIDE 20 MEQ/1
40 TABLET, EXTENDED RELEASE ORAL ONCE
Status: COMPLETED | OUTPATIENT
Start: 2023-01-06 | End: 2023-01-06

## 2023-01-06 RX ORDER — POLYETHYLENE GLYCOL 3350 17 G/17G
17 POWDER, FOR SOLUTION ORAL DAILY PRN
Qty: 527 G | Refills: 1 | Status: SHIPPED | OUTPATIENT
Start: 2023-01-06 | End: 2023-01-06 | Stop reason: SDUPTHER

## 2023-01-06 RX ORDER — IPRATROPIUM BROMIDE AND ALBUTEROL SULFATE 2.5; .5 MG/3ML; MG/3ML
3 SOLUTION RESPIRATORY (INHALATION) EVERY 6 HOURS
Qty: 360 ML | Refills: 0 | Status: SHIPPED | OUTPATIENT
Start: 2023-01-06 | End: 2023-01-06 | Stop reason: SDUPTHER

## 2023-01-06 RX ORDER — FUROSEMIDE 40 MG/1
20 TABLET ORAL DAILY
Qty: 5 TABLET | Refills: 0 | Status: SHIPPED | OUTPATIENT
Start: 2023-01-06 | End: 2023-01-06 | Stop reason: SDUPTHER

## 2023-01-06 RX ORDER — FUROSEMIDE 40 MG/1
20 TABLET ORAL DAILY
Qty: 5 TABLET | Refills: 0 | Status: SHIPPED | OUTPATIENT
Start: 2023-01-06 | End: 2023-01-16

## 2023-01-06 RX ADMIN — POTASSIUM CHLORIDE 40 MEQ: 1500 TABLET, EXTENDED RELEASE ORAL at 14:19

## 2023-01-06 RX ADMIN — CYANOCOBALAMIN TAB 500 MCG 1000 MCG: 500 TAB at 08:09

## 2023-01-06 RX ADMIN — IPRATROPIUM BROMIDE AND ALBUTEROL SULFATE 1 AMPULE: 2.5; .5 SOLUTION RESPIRATORY (INHALATION) at 06:45

## 2023-01-06 RX ADMIN — LISINOPRIL 5 MG: 10 TABLET ORAL at 08:10

## 2023-01-06 RX ADMIN — HYDROCODONE BITARTRATE AND ACETAMINOPHEN 1 TABLET: 5; 325 TABLET ORAL at 14:19

## 2023-01-06 RX ADMIN — SODIUM CHLORIDE, PRESERVATIVE FREE 10 ML: 5 INJECTION INTRAVENOUS at 08:10

## 2023-01-06 RX ADMIN — IPRATROPIUM BROMIDE AND ALBUTEROL SULFATE 1 AMPULE: 2.5; .5 SOLUTION RESPIRATORY (INHALATION) at 15:00

## 2023-01-06 RX ADMIN — ASPIRIN 81 MG: 81 TABLET, CHEWABLE ORAL at 08:09

## 2023-01-06 RX ADMIN — IPRATROPIUM BROMIDE AND ALBUTEROL SULFATE 1 AMPULE: 2.5; .5 SOLUTION RESPIRATORY (INHALATION) at 10:30

## 2023-01-06 RX ADMIN — CARVEDILOL 3.12 MG: 3.12 TABLET, FILM COATED ORAL at 08:09

## 2023-01-06 ASSESSMENT — PAIN SCALES - GENERAL: PAINLEVEL_OUTOF10: 5

## 2023-01-06 ASSESSMENT — PAIN DESCRIPTION - DESCRIPTORS: DESCRIPTORS: ACHING

## 2023-01-06 ASSESSMENT — PAIN DESCRIPTION - LOCATION: LOCATION: BACK;LEG

## 2023-01-06 ASSESSMENT — PAIN DESCRIPTION - ORIENTATION: ORIENTATION: RIGHT;LEFT

## 2023-01-06 ASSESSMENT — PAIN - FUNCTIONAL ASSESSMENT: PAIN_FUNCTIONAL_ASSESSMENT: PREVENTS OR INTERFERES SOME ACTIVE ACTIVITIES AND ADLS

## 2023-01-06 NOTE — PROGRESS NOTES
Occupational Therapy     01/06/23 1126   Subjective   Subjective Pt in bed upon arrival for therapy. Friend present. Pt requires encouragement to participate. Pain Assessment   Pain Assessment None - Denies Pain   Vitals   Heart Rate 80   Heart Rate Source Monitor   /80   BP Location Left upper arm   MAP (Calculated) 92   Resp 16   SpO2 95 %   O2 Device None (Room air)   Cognition   Overall Cognitive Status Exceptions   Orientation   Overall Orientation Status WFL   Bed Mobility Training   Bed Mobility Training Yes   Overall Level of Assistance Moderate assistance   Interventions Verbal cues; Tactile cues;Manual cues   Supine to Sit Moderate assistance   Scooting Minimum assistance;Contact-guard assistance   Transfer Training   Transfer Training Yes   Overall Level of Assistance Minimum assistance   Interventions Verbal cues; Tactile cues;Manual cues   Sit to Stand Minimum assistance;Contact-guard assistance   Stand to Sit Minimum assistance;Contact-guard assistance   Bed to Chair Minimum assistance;Contact-guard assistance   Toilet Transfer Minimum assistance;Contact-guard assistance   Balance   Sitting With support   Standing With support   ADL   Feeding Setup;Supervision   Feeding Skilled Clinical Factors requires cutting up food due to weakness. Grooming Setup;Supervision   UE Bathing Minimal assistance   LE Bathing Minimal assistance   UE Dressing Minimal assistance   LE Dressing Minimal assistance; Moderate assistance   LE Dressing Skilled Clinical Factors Unable to don socks on her own. Toileting Minimal assistance; Moderate assistance   Toileting Skilled Clinical Factors to manage clothing and clean up. Additional Comments using purewick currently, incontinent of urine each time standing   Assessment   Assessment Tx focused on bed mobility, BSC use, and functional mobility/ transfer from [facility] to doorway and back to recliner. Pt agreeable to sit up in the recliner for lunch this date.    Activity Tolerance Patient limited by endurance   Discharge Recommendations Patient would benefit from continued therapy after discharge;24 hour supervision or assist   Occupational Therapy Plan   Times Per Week 3-5   Times Per Day Once a day   OT Plan of Care   Friday X   Electronically signed by ANYA Manzo on 1/6/2023 at 12:00 PM

## 2023-01-06 NOTE — DISCHARGE SUMMARY
Discharge Summary    NAME: Genna Wagner  :  1930  MRN:  [id number]    Admit date:  2022  Discharge date:      Admitting Physician:  No admitting provider for patient encounter. 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   Patient's respiratory status is improving  Magnesium, phosphorus, TSH within normal limits  BNP 5, 826> trending down  Lipid panel reviewed  Troponin trend unremarkable continue with antihypertensive regimen  Patient continues on diuresis   Cardiology consultation and evaluation echocardiogram resulting in an EF of 35%  Plans for LifeVest>> on discharge  Continue with daily weights, strict I's and O's> noted proBNP reduced from 5826 down to 1881 likely baseline   intake and output reviewed 2.8 L of urine output over the last 24-hour peripheral edema has subsides> discontinue Lasix drip   placed on 1 L of fluid restriction with 20 mg of furosemide daily for next 10 days and need to be reassessed with BMP and follow-up with PCP     ELVIRA likely prerenal from diuresis  Creatinine 1.3 above baseline of 0.8   avoid nephrotoxic drug, monitor intake and output, start on IV fluid NS at 100 cc/h for 1 L only   renal function back to baseline creatinine of 0.7, avoid nephrotoxic drug     Hypokalemia likely due to diuresis  Magnesium noted to be within normal limits   supplemented orally     Hyperlipidemia  Chronic condition, continue statin therapy     Generalized muscle weakness/at risk for malnutrition   appreciate nutritional evaluations, once optimized from cardiology standpoint will recommend PT/OT evaluation     New diagnosis of COPD  Associated with atelectasis due to deconditioning started on incentive spirometry   will start on DuoNeb every 4 hourly and monitor clinically  1/6 given prescription for DuoNeb 4 times a day    HOSPITAL COURSE: Mrs. Georgina Payne is a pleasant 80year old  american lady from home. She confirms that she has stopped taking her lasix at home as she didn't feel good after taking it. She had greatly swollen legs that have remained swollen despite aggressive IV Diuresis. She stated dyspnea to EMS who gave her lasix and as per the ED team she had great success with EMS as she had begun to rapidly diurese with them while en route. Cognitive evaluation noted to be within functional limits, but related to speech therapy for continued diuresis cumulative I's and O's greater than 6 L. Cardiologist with possibilities of the LifeVest placement. Repeat of a chest x-ray revealed mild cardiomegaly with prominence of bronchovascular markings in bilateral lungs. Echocardiogram resulted with an EF of 35%. 1/3 patient seen and examined this a.m. resting in bed, in good spirits noticeably fatigued. Denying any pain, states she had a lot of fluid. Wanting to go home. No adverse events reported overnight. Denies pain, fevers or chills. 1/4 seen and evaluated at bedside the peripheral edema is cleared up, discussed with RN at bedside discontinue Lasix drip, presently having wheezing we will start on DuoNeb likely undiagnosed COPD     1/5 early in the morning the RN reported the patient has no urine output since last night, seen and evaluated at bedside later in the day after starting IV fluid no peripheral edema the patient having good urine output    1/6 seen at the bedside discussed about incentive spirometry for atelectasis, continue nebulizer 4 times a day, discussed with RN about the benefit of home health aide, potassium supplementation.   Patient family agreeable to later go home with home health     Physical Exam:  Vital Signs: BP (!) 116/54   Pulse 80   Temp 97.7 °F (36.5 °C) (Temporal)   Resp 16   Ht 5' (1.524 m)   Wt 122 lb 11.2 oz (55.7 kg)   SpO2 95%   BMI 23.96 kg/m²   General appearance:. Alert and Cooperative   HEENT: Normocephalic. Chest: No accessory use of respiratory muscles, poor air entry with few inspiratory crackles and expiratory wheezing over the posterior part of the lungs bilaterally  Cardiac: Normal heart tones with regular rate and rhythm, S1, S2 normal. No murmurs, gallops, or rubs auscultated. Abdomen:soft, non-tender; normal bowel sounds, no masses, no organomegaly. Extremities: No clubbing or cyanosis. No peripheral edema. Peripheral pulses palpable. Neurologic: Grossly intact. Significant Diagnostic Studies:   XR CHEST PORTABLE    Result Date: 1/1/2023  PROCEDURE: XR CHEST PORTABLE CLINICAL INDICATION: Shortness of breath. COMPARISON: No prior similar images available for comparison. FINDINGS: Moderate pulmonary emphysematous changes are seen. Bibasilar subsegmental atelectatic changes are evident. Heart is enlarged. There is soft tissue density in the left paratracheal region with tracheal deviation to the right. Mild pulmonary vascular congestion changes are evident. There is no pneumothorax. Bilateral costophrenic angle are shallow likely representing subsegmental atelectasis/scarring. Visualized osseous structures are significantly osteopenic. 1.Moderate pulmonary emphysema. 2.Cardiomegaly with mild pulmonary vascular changes. 3.Soft tissue density in the left paratracheal region with tracheal deviation to the right may represent lymphadenopathy or activity thoracic aorta. Further evaluation with CT thorax can be considered if clinically indicated.       Pertinent Labs:   CBC:   Recent Labs     01/04/23 0242   WBC 6.0   HGB 12.8        BMP:    Recent Labs     01/04/23  0242 01/05/23  0209 01/06/23  0918    135* 138   K 3.7 4.1 3.4*   CL 91* 90* 96*   CO2 38* 35* 34*   BUN 12 20 15   CREATININE 0.8 1.3* 0.7   GLUCOSE 99 91 132*         Discharge Medications:         Medication List        START taking these medications      cyanocobalamin 1000 MCG tablet  Take 1 tablet by mouth daily  Start taking on: January 7, 2023     ipratropium-albuterol 0.5-2.5 (3) MG/3ML Soln nebulizer solution  Commonly known as: DUONEB  Inhale 3 mLs into the lungs in the morning and 3 mLs at noon and 3 mLs in the evening and 3 mLs before bedtime. polyethylene glycol 17 g packet  Commonly known as: GLYCOLAX  Take 17 g by mouth daily as needed for Constipation or Other (No BM in 60 hours)            CHANGE how you take these medications      furosemide 40 MG tablet  Commonly known as: LASIX  Take 0.5 tablets by mouth daily for 10 days Need reassessment with labs and vitals after 10 days in PCPs office  What changed:   how much to take  additional instructions            CONTINUE taking these medications      aspirin 81 MG tablet     carvedilol 3.125 MG tablet  Commonly known as: COREG  TAKE 1 TABLET BY MOUTH 2 TIMES DAILY (WITH MEALS)     HYDROcodone-acetaminophen 5-325 MG per tablet  Commonly known as: NORCO     lisinopril 5 MG tablet  Commonly known as: PRINIVIL;ZESTRIL  Take 1 tablet by mouth daily     pravastatin 40 MG tablet  Commonly known as: PRAVACHOL               Where to Get Your Medications        [facility]  [address] [place] [address]      Phone: [phone]   cyanocobalamin 1000 MCG tablet  furosemide 40 MG tablet  ipratropium-albuterol 0.5-2.5 (3) MG/3ML Soln nebulizer solution  polyethylene glycol 17 g packet         Discharge Instructions: Follow up with [facility] in 7 days. Take medications as directed. Resume activity as tolerated. Diet: ADULT DIET; Regular; No Added Salt (3-4 gm); likes overcooked veg, fruit, grd meat with gravy.      NO Bread/rice  ADULT ORAL NUTRITION SUPPLEMENT; Lunch, Dinner; Fortified Pudding Oral Supplement     Disposition: Patient is medically stable and will be discharged home with home health aide. Time spent on discharge 40 minutes.     Signed:  Miguel Angel Montague MD  1/6/2023 2:43 PM

## 2023-01-06 NOTE — PROGRESS NOTES
Cardiology Progress Note Kamron Seaman DO     I personally saw the patient and rounded with:  RN, on  1/1/23      The observations documented in this note, including the assessment and plan are mine          Date of Admission:  12/31/2022  5:50 PM    Date of Initially Being Seen / Consultation:  1/1/23    Cardiologist:  Heath Kohli 53 Attending: Dr. Mame Schultz     PCP:  Dana Angulo    Reason for Consultation or Admission / Chief Complaint:  sob and leg swelling    Progress note  Subjective:  Patient is seen and examined. No acute event overnight. SOB much better. Denies any active chest pain or palpitation. Cardiovascular review of systems otherwise negative. SUBJECTIVE AND HISTORY OF PRESENT ILLNESS:    Source of the history:  Patient, family, previous inpatient and outpatient records in [facility]. Jennifer Nelson is a 80 y.o. female who presents to [facility] with chief complaint of leg swelling and sob due to medication noncompliance consulted to the cardiology team for further work-up.  + sob. Denies any chest pain. Patient denies any diaphoresis. Denies any palpitation. + leg swelling. Denies any focal weakness or numbness. Denies any hematemesis or melena or easy bruising or bleeding. Cardiovascular review of system is otherwise negative.       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)        Ischemic dilated cardiomyopathy (HCC)        Mixed hyperlipidemia        AAA (abdominal aortic aneurysm)        Chronic systolic congestive heart failure ([facility] [address] Descending thoracic aortic aneurysm        Hyperlipemia        Coronary artery disease involving native heart without angina pectoris        Hyperlipidemia             PRIOR CARDIAC PROBLEM LIST  (IF APPLICABLE):    CHF  CAD      Past Medical History:    Past Medical History:   Diagnosis Date    CAD (coronary artery disease) 06/12/2013    Cardiomyopathy ([facility] [address] 06/12/2013    EF 25%    CHF (congestive heart failure) ([facility] [address]     Hyperlipemia     Dr. Val Hernandez manages    Palliative care patient 01/02/2023    Status post coronary artery stent placement 06/12/2013    LAD         Past Surgical History:    Past Surgical History:   Procedure Laterality Date    APPENDECTOMY      BACK SURGERY      CARDIAC CATHETERIZATION  4/8/2013   [facility]    EF 20-25%    TONSILLECTOMY           Home Medications:   Prior to Admission medications    Medication Sig Start Date End Date Taking? Authorizing Provider   carvedilol (COREG) 3.125 MG tablet TAKE 1 TABLET BY MOUTH 2 TIMES DAILY (WITH MEALS) 1/19/18   IZA Tay   lisinopril (PRINIVIL;ZESTRIL) 5 MG tablet Take 1 tablet by mouth daily 4/2/16   Kaley Chang MD   pravastatin (PRAVACHOL) 40 MG tablet Take 40 mg by mouth daily. Historical Provider, MD   HYDROcodone-acetaminophen (NORCO) 5-325 MG per tablet Take 1 tablet by mouth every 6 hours as needed for Pain. Historical Provider, MD   furosemide (LASIX) 40 MG tablet Take 40 mg by mouth daily    Historical Provider, MD   aspirin 81 MG tablet Take 81 mg by mouth daily.     Historical Provider, MD        Facility Administered Medications:    enoxaparin  30 mg SubCUTAneous Daily    ipratropium-albuterol  1 ampule Inhalation Q4H WA    vitamin B-12  1,000 mcg Oral Daily    aspirin  81 mg Oral Daily    carvedilol  3.125 mg Oral BID     atorvastatin  40 mg Oral Nightly    lisinopril  5 mg Oral Daily    sodium chloride flush  5-40 mL IntraVENous 2 times per day       Allergies:  Codeine, Cortizone, Penicillins, and Sulfa antibiotics Social History:       Social History     Socioeconomic History    Marital status:      Spouse name: Not on file    Number of children: 1    Years of education: Not on file    Highest education level: Not on file   Occupational History    Occupation: worked in a factory   Tobacco Use    Smoking status: Never    Smokeless tobacco: Never   Vaping Use    Vaping Use: Never used   Substance and Sexual Activity    Alcohol use: Not Currently     Comment: \"Maybe a few drinks years ago but I don't drink anymore\"    Drug use: No    Sexual activity: Not on file     Comment: had a son   Other Topics Concern    Not on file   Social History Narrative    CODE STATUS: DNR    HEALTH CARE PROXY: either [place] [address] [place]    AMBULATES: uses a walker    DOMICILED: lives alone     Social Determinants of Health     Financial Resource Strain: Not on [employer] Insecurity: Not on file   Transportation Needs: Not on file   Physical Activity: Not on file   Stress: Not on file   Social Connections: Not on file   Intimate Partner Violence: Not on file   Housing Stability: Not on file       Family History:     Family History   Problem Relation Age of Onset    Cancer Sister          REVIEW OF SYSTEMS:     Constitutional: Negative. Eyes: Negative. Respiratory: Negative. Cardiac:   Gastrointestinal: Negative. Genitourinary: Negative. Musculoskeletal: Negative. Skin: Negative. Neurological:  negative  Hematological: Negative. Psychiatric/Behavioral: Negative. PHYSICAL EXAMINATION:     /60   Pulse 76   Temp 97.6 °F (36.4 °C) (Temporal)   Resp 20   Ht 5' (1.524 m)   Wt 122 lb 11.2 oz (55.7 kg)   SpO2 96%   BMI 23.96 kg/m²     Vitals and nursing note reviewed. Constitutional:       Appearance:  well-developed. HENT:      Head: Normocephalic and atraumatic. Eyes:      General:         Right eye: No discharge. Left eye: No discharge. Conjunctiva/sclera: Conjunctivae normal.      Pupils: Pupils are equal, round, and reactive to light. Cardiovascular:      Rate and Rhythm: Normal rate and regular rhythm. Heart sounds: No significant murmur. Pulmonary:      Effort: Pulmonary effort is normal. No respiratory distress. Breath sounds: Normal breath sounds. No wheezing or rales. Abdominal:      General: Bowel sounds are normal.      Palpations: Abdomen is soft. There is no mass. Tenderness: There is no abdominal tenderness. There is no guarding or rebound. Musculoskeletal:         General: No tenderness. Normal range of motion. Cervical back: Normal range of motion and neck supple. Skin:     General: Skin is warm and dry. Capillary Refill: Capillary refill takes less than 2 seconds. Neurological:      Mental Status:  alert and oriented to person, place, and time. Psychiatric:         Behavior: Behavior normal.         Thought Content: Thought content normal.         Judgment: Judgment normal.     LABORATORY EVALUATION & TESTING:    I have personally reviewed and interpreted the results of the following diagnostic testing      EKG and or Telemetry:  which was personally reviewed me:      Troponin: Troponins are . the creatinine is     CBC:   Recent Labs     01/03/23 0220 01/04/23 0242   WBC 5.4 6.0   HGB 11.9* 12.8   HCT 37.2 40.4   MCV 96.9 98.8    144     BMP:   Recent Labs     01/03/23 0220 01/04/23 0242 01/05/23  0209    139 135*   K 3.2* 3.7 4.1   CL 94* 91* 90*   CO2 38* 38* 35*   BUN 11 12 20   CREATININE 0.7 0.8 1.3*     Cardiac Enzymes:   No results for input(s): CKTOTAL, CKMB, CKMBINDEX, TROPONINI in the last 72 hours. PT/INR:   No results for input(s): PROTIME, INR in the last 72 hours. APTT:   No results for input(s): APTT in the last 72 hours.     Liver Profile:  Lab Results   Component Value Date/Time    AST 14 12/31/2022 05:55 PM    ALT <5 12/31/2022 05:55 PM    BILITOT 1.1 12/31/2022 05:55 PM    ALKPHOS 75 12/31/2022 05:55 PM     Lab Results   Component Value Date/Time    CHOL 121 12/31/2022 05:55 PM    HDL 60 12/31/2022 05:55 PM    TRIG 99 12/31/2022 05:55 PM     TSH:  No results found for: TSH  UA:   Lab Results   Component Value Date/Time    COLORU YELLOW 01/01/2023 01:39 AM    PHUR 6.0 01/01/2023 01:39 AM    WBCUA 1 02/08/2022 04:35 PM    RBCUA 4 02/08/2022 04:35 PM    MUCUS 3+ 04/05/2013 05:50 PM    YEAST 2+ 04/05/2013 05:50 PM    BACTERIA NEGATIVE 02/08/2022 04:35 PM    CLARITYU Clear 01/01/2023 01:39 AM    SPECGRAV 1.008 01/01/2023 01:39 AM    LEUKOCYTESUR Negative 01/01/2023 01:39 AM    UROBILINOGEN 0.2 01/01/2023 01:39 AM    BILIRUBINUR Negative 01/01/2023 01:39 AM    BLOODU Negative 01/01/2023 01:39 AM    GLUCOSEU Negative 01/01/2023 01:39 AM     TTE:   Normal left ventricular size with severely reduced LV function and an  estimated ejection fraction of approximately 35%. Mild concentric left ventricular hypertrophy. Unable to accurately assess diastolic function due to mitral stenosis    Ballooned out, akinetic apex with all other wall segments being hypokinetic. No evidence of left ventricular mass or thrombus noted. Mild to Moderate mitral valve stenosis (mean pressure gradient of 7 mmHg),  trace mitral regurgitation. Moderately thickened and calcified aortic valve with moderately reduced  leaflet mobility. Individual aortic valve leaflets are not clearly visualized, appears to be   tricuspid though. Moderate aortic stenosis; no evidence of significant aortic regurgitation. The aortic valve area is 1.05 cm2 with a maximum gradient of 52 mmHg and a   mean gradient of 28 mmHg. Right ventricular systolic pressure of 36 mm Hg consistent with mild   pulmonary hypertension. Moderate to severely dilated left atrium. Floppy intra-atrial septum.    Normal intact intra-atrial septum was noted with no evidence of   significant intra-atrial communications by color flow Doppler or by   agitated saline study. Assessment:  Shortness of breath  H/O CAD/MI S/P stent in LAD  Acute on chronic combined systolic and diastolic heart failure with LVEF of 35%  Ballooned out apex may be indicative of Takotsubo cardiomyopathy   Moderate AS  Mild to moderate MS  Mild PHTN  Hypertension  Hyperlipidemia  Macrocytic Anemia      Plan:  Troponin negative   Pro BNP elevated  Continue to monitor on telemetry  Echocardiogram suggestive of EF of 35% with moderate aortic stenosis and mild to moderate mitral stenosis. Continue aspirin, statin, BB  Continue lasix, monitor BMP  Patient may benefit from LifeVest. Discussed the risk and benefits. Agreed to try initially but after discussing with MPOA, they are reluctant due to compliance issues because of her weakness and debility. Patient is DNR    I had a detailed discussion with the patient and / or family regarding the historical points, physical examination findings, and any diagnostic results supporting the admission diagnosis. The patient was educated on care and need for admission. questions were invited and answered. Patient and / or family shows understanding of admission information and agrees to follow. I have discussed the risks, benefits and options with the patient and her family. They appear to understand, have no questions, and wish to proceed. Discussed with patient and family and nursing. I greatly appreciate the opportunity and your confidence in allowing me to participate in the care of Genna Wagner    Electronically signed by Georgi Silva DO on 1/1/23   Interventional cardiologist, [facility] [place].

## 2023-01-06 NOTE — PROGRESS NOTES
Physical Therapy  Name: Lakhwinder Lane  MRN:  [id number]  Date of service:  1/6/2023 01/06/23 1253   Restrictions/Precautions   Restrictions/Precautions Fall Risk   Required Braces or Orthoses? No   General   Chart Reviewed Yes   Family / Caregiver Present Yes   Subjective   Subjective Patient is laying in bed and agrees to therapy   Pain Assessment   Pain Assessment None - Denies Pain   Vital Signs   BP (!) 116/54   MAP (Calculated) 75   BP Location Right upper arm   Patient Position Sitting   Bed Mobility   Supine to Sit Moderate assistance;Minimal assistance   Scooting Contact guard assistance;Minimal assistance   Transfers   Sit to Stand Minimal Assistance;2 Person Assistance   Stand to Sit Minimal Assistance;2 Person Assistance   Stand Pivot Transfers Minimal Assistance;Contact guard assistance;2 Person Assistance   Ambulation   Surface Level tile   Device Rolling Walker   Assistance Minimal assistance   Quality of Gait a little weak   Gait Deviations Slow Fely;Decreased step length;Decreased step height   Distance 20'   Patient Goals    Patient Goals  go home   Short Term Goals   Time Frame for Short Term Goals 2 wks   Short Term Goal 1 supine to sit indep   Short Term Goal 2 sit to stand indep   Short Term Goal 3 amb. 200' with RW SBA   Conditions Requiring Skilled Therapeutic Intervention   Body Structures, Functions, Activity Limitations Requiring Skilled Therapeutic Intervention Decreased functional mobility ; Decreased strength;Decreased balance;Decreased safe awareness;Decreased cognition;Decreased endurance   Assessment Patient able to transfer to [facility] and ambulate a little in the room before being positioned in her recliner. She c/o some dizziness upon getting up with BP checked and documented in vitals section. She had no lob but required some cues for technique and safety. Will continue to follow and progress as able.    Activity Tolerance   Activity Tolerance Patient limited by endurance   PT Plan of Care   Friday X   Safety Devices   Type of Devices Gait belt;Call light within reach; Chair alarm in place; Left in bed     Electronically signed by Bethany Marti PTA on 1/6/2023 at 1:05 PM

## 2023-01-06 NOTE — CARE COORDINATION
[facility] pt/family request. Family states they will pick it up on their way home. [facility] with [facility] to inform them of this.   Electronically signed by Galilea Muro on 1/6/2023 at 3:41 PM

## 2023-01-06 NOTE — PROGRESS NOTES
Physician Progress Note      Silvina Templeton  CSN #:                  [id number]  :                       1930  ADMIT DATE:       2022 5:50 PM  [address] [place] DATE:  RESPONDING  PROVIDER #:        Jasmyne Abel MD          QUERY TEXT:    Pt admitted with non compliance of taking Lasix and has CHF documented. If   possible, please document in progress notes and discharge summary further   specificity regarding the type and acuity of CHF:    The medical record reflects the following:  Risk Factors: HX of CHF, Age,  Clinical Indicators: Wheezing, fluid overload,  Echo - EF 35%. , legs   swelling, Non compliance with Lasix. Cardiology documents  \"Acute on chronic combined systolic and diastolic heart failure. \"  Treatment: Labs, CXR, Lasix drip 5 mg/hr,    Thank you    Nancy Poe RN, BSN,[facility]  [phone]  Options provided:  -- Acute on Chronic Systolic CHF/HFrEF  -- Acute on Chronic Systolic and Diastolic CHF  -- Chronic Systolic CHF/HFrEF  -- Chronic Systolic and Diastolic CHF  -- Other - I will add my own diagnosis  -- Disagree - Not applicable / Not valid  -- Disagree - Clinically unable to determine / Unknown  -- Refer to Clinical Documentation Reviewer    PROVIDER RESPONSE TEXT:    This patient is in acute on chronic systolic and diastolic CHF.     Query created by: Alexsandra Ruby on 1/3/2023 2:33 PM      Electronically signed by:  Jasmyne Abel MD 2023 2:45 PM

## 2023-01-06 NOTE — PLAN OF CARE
Problem: Discharge Planning  Goal: Discharge to home or other facility with appropriate resources  Outcome: Progressing  Flowsheets (Taken 1/5/2023 2007)  Discharge to home or other facility with appropriate resources: Identify barriers to discharge with patient and caregiver     Problem: Skin/Tissue Integrity  Goal: Absence of new skin breakdown  Description: 1. Monitor for areas of redness and/or skin breakdown  2. Assess vascular access sites hourly  3. Every 4-6 hours minimum:  Change oxygen saturation probe site  4. Every 4-6 hours:  If on nasal continuous positive airway pressure, respiratory therapy assess nares and determine need for appliance change or resting period.   Outcome: Progressing     Problem: Chronic Conditions and Co-morbidities  Goal: Patient's chronic conditions and co-morbidity symptoms are monitored and maintained or improved  Outcome: Progressing  Flowsheets (Taken 1/5/2023 2007)  Care Plan - Patient's Chronic Conditions and Co-Morbidity Symptoms are Monitored and Maintained or Improved: Monitor and assess patient's chronic conditions and comorbid symptoms for stability, deterioration, or improvement     Problem: Pain  Goal: Verbalizes/displays adequate comfort level or baseline comfort level  Outcome: Progressing     Problem: Safety - Adult  Goal: Free from fall injury  Outcome: Progressing

## 2023-01-06 NOTE — PROGRESS NOTES
Cardiology Progress Note Michael Matthews DO     I personally saw the patient and rounded with:  RN, on  1/1/23      The observations documented in this note, including the assessment and plan are mine          Date of Admission:  12/31/2022  5:50 PM    Date of Initially Being Seen / Consultation:  1/1/23    Cardiologist:  Heath Reyes 53 Attending: Dr. David Ramos     PCP:  Barby Villagomez    Reason for Consultation or Admission / Chief Complaint:  sob and leg swelling    Progress note  Subjective:  Patient is seen and examined. No acute event overnight. SOB much better. Denies any active chest pain or palpitation. Cardiovascular review of systems otherwise negative. SUBJECTIVE AND HISTORY OF PRESENT ILLNESS:    Source of the history:  [facility], family[facility] 80 y.[facility]. female who [facility] with chief complaint of leg swelling and sob due to medication noncompliance consulted to the cardiology team for further work-up.  + sob. Denies any chest pain. Patient denies any diaphoresis. Denies any palpitation. + leg swelling. Denies any focal weakness or numbness. Denies any hematemesis or melena or easy bruising or bleeding. Cardiovascular review of system is otherwise negative.       Family present:      CARDIAC RISK PROFILE:    Risk Factor Yes / No / Unknown       Gender Female   Cigarette Use No   Family History of Cardiovascular Disease No   Diabetes Mellitus no   Hypercholesteremia yes   Hypertension yes          Cardiac Specific Problems:    Specialty Problems          Cardiology Problems    PAD (peripheral artery disease) ([facility])        * (Principal) Systolic and diastolic CHF, acute on chronic (HCC)        Ischemic dilated cardiomyopathy (HCC)        Mixed hyperlipidemia        AAA (abdominal aortic aneurysm)        Chronic systolic congestive heart failure ([facility] [address] Descending thoracic aortic aneurysm        Hyperlipemia        Coronary artery disease involving native heart without angina pectoris        Hyperlipidemia             PRIOR CARDIAC PROBLEM LIST  (IF APPLICABLE):    CHF  CAD      Past Medical History:    Past Medical History:   Diagnosis Date    CAD (coronary artery disease) 06/12/2013    Cardiomyopathy ([facility] [address] 06/12/2013    EF 25%    CHF (congestive heart failure) ([facility] [address]     Hyperlipemia     Dr. Cara Waters manages    Palliative care patient 01/02/2023    Status post coronary artery stent placement 06/12/2013    LAD         Past Surgical History:    Past Surgical History:   Procedure Laterality Date    APPENDECTOMY      BACK SURGERY      CARDIAC CATHETERIZATION  4/8/2013   [facility]    EF 20-25%    TONSILLECTOMY           Home Medications:   Prior to Admission medications    Medication Sig Start Date End Date Taking? Authorizing Provider   carvedilol (COREG) 3.125 MG tablet TAKE 1 TABLET BY MOUTH 2 TIMES DAILY (WITH MEALS) 1/19/18   ZIA Byrnes   lisinopril (PRINIVIL;ZESTRIL) 5 MG tablet Take 1 tablet by mouth daily 4/2/16   Kelby Randhawa MD   pravastatin (PRAVACHOL) 40 MG tablet Take 40 mg by mouth daily. Historical Provider, MD   HYDROcodone-acetaminophen (NORCO) 5-325 MG per tablet Take 1 tablet by mouth every 6 hours as needed for Pain. Historical Provider, MD   furosemide (LASIX) 40 MG tablet Take 40 mg by mouth daily    Historical Provider, MD   aspirin 81 MG tablet Take 81 mg by mouth daily.     Historical Provider, MD        Facility Administered Medications:    enoxaparin  30 mg SubCUTAneous Daily    ipratropium-albuterol  1 ampule Inhalation Q4H WA    vitamin B-12  1,000 mcg Oral Daily    aspirin  81 mg Oral Daily    carvedilol  3.125 mg Oral BID     atorvastatin  40 mg Oral Nightly    lisinopril  5 mg Oral Daily    sodium chloride flush  5-40 mL IntraVENous 2 times per day       Allergies:  Codeine, Cortizone, Penicillins, and Sulfa antibiotics Social History:       Social History     Socioeconomic History    Marital status:      Spouse name: Not on file    Number of children: 1    Years of education: Not on file    Highest education level: Not on file   Occupational History    Occupation: worked in a factory   Tobacco Use    Smoking status: Never    Smokeless tobacco: Never   Vaping Use    Vaping Use: Never used   Substance and Sexual Activity    Alcohol use: Not Currently     Comment: \"Maybe a few drinks years ago but I don't drink anymore\"    Drug use: No    Sexual activity: Not on file     Comment: had a son   Other Topics Concern    Not on file   Social History Narrative    CODE STATUS: DNR    HEALTH CARE PROXY: either Richard Patel of [place] or Kaylen Segundo of [place] area    AMBULATES: uses a walker    DOMICILED: lives alone     Social Determinants of Health     Financial Resource Strain: Not on [employer] Insecurity: Not on file   Transportation Needs: Not on file   Physical Activity: Not on file   Stress: Not on file   Social Connections: Not on file   Intimate Partner Violence: Not on file   Housing Stability: Not on file       Family History:     Family History   Problem Relation Age of Onset    Cancer Sister          REVIEW OF SYSTEMS:     Constitutional: Negative. Eyes: Negative. Respiratory: Negative. Cardiac:   Gastrointestinal: Negative. Genitourinary: Negative. Musculoskeletal: Negative. Skin: Negative. Neurological:  negative  Hematological: Negative. Psychiatric/Behavioral: Negative. PHYSICAL EXAMINATION:     /60   Pulse 76   Temp 97.6 °F (36.4 °C) (Temporal)   Resp 20   Ht 5' (1.524 m)   Wt 122 lb 11.2 oz (55.7 kg)   SpO2 96%   BMI 23.96 kg/m²     Vitals and nursing note reviewed. Constitutional:       Appearance:  well-developed. HENT:      Head: Normocephalic and atraumatic. Eyes:      General:         Right eye: No discharge. Left eye: No discharge. Conjunctiva/sclera: Conjunctivae normal.      Pupils: Pupils are equal, round, and reactive to light. Cardiovascular:      Rate and Rhythm: Normal rate and regular rhythm. Heart sounds: No significant murmur. Pulmonary:      Effort: Pulmonary effort is normal. No respiratory distress. Breath sounds: Normal breath sounds. No wheezing or rales. Abdominal:      General: Bowel sounds are normal.      Palpations: Abdomen is soft. There is no mass. Tenderness: There is no abdominal tenderness. There is no guarding or rebound. Musculoskeletal:         General: No tenderness. Normal range of motion. Cervical back: Normal range of motion and neck supple. Skin:     General: Skin is warm and dry. Capillary Refill: Capillary refill takes less than 2 seconds. Neurological:      Mental Status:  alert and oriented to person, place, and time. Psychiatric:         Behavior: Behavior normal.         Thought Content: Thought content normal.         Judgment: Judgment normal.     LABORATORY EVALUATION & TESTING:    I have personally reviewed and interpreted the results of the following diagnostic testing      EKG and or Telemetry:  which was personally reviewed me:      Troponin: Troponins are . the creatinine is     CBC:   Recent Labs     01/03/23 0220 01/04/23 0242   WBC 5.4 6.0   HGB 11.9* 12.8   HCT 37.2 40.4   MCV 96.9 98.8    144     BMP:   Recent Labs     01/03/23 0220 01/04/23 0242 01/05/23  0209    139 135*   K 3.2* 3.7 4.1   CL 94* 91* 90*   CO2 38* 38* 35*   BUN 11 12 20   CREATININE 0.7 0.8 1.3*     Cardiac Enzymes:   No results for input(s): CKTOTAL, CKMB, CKMBINDEX, TROPONINI in the last 72 hours. PT/INR:   No results for input(s): PROTIME, INR in the last 72 hours. APTT:   No results for input(s): APTT in the last 72 hours.     Liver Profile:  Lab Results   Component Value Date/Time    AST 14 12/31/2022 05:55 PM    ALT <5 12/31/2022 05:55 PM    BILITOT 1.1 12/31/2022 05:55 PM    ALKPHOS 75 12/31/2022 05:55 PM     Lab Results   Component Value Date/Time    CHOL 121 12/31/2022 05:55 PM    HDL 60 12/31/2022 05:55 PM    TRIG 99 12/31/2022 05:55 PM     TSH:  No results found for: TSH  UA:   Lab Results   Component Value Date/Time    COLORU YELLOW 01/01/2023 01:39 AM    PHUR 6.0 01/01/2023 01:39 AM    WBCUA 1 02/08/2022 04:35 PM    RBCUA 4 02/08/2022 04:35 PM    MUCUS 3+ 04/05/2013 05:50 PM    YEAST 2+ 04/05/2013 05:50 PM    BACTERIA NEGATIVE 02/08/2022 04:35 PM    CLARITYU Clear 01/01/2023 01:39 AM    SPECGRAV 1.008 01/01/2023 01:39 AM    LEUKOCYTESUR Negative 01/01/2023 01:39 AM    UROBILINOGEN 0.2 01/01/2023 01:39 AM    BILIRUBINUR Negative 01/01/2023 01:39 AM    BLOODU Negative 01/01/2023 01:39 AM    GLUCOSEU Negative 01/01/2023 01:39 AM     TTE:   Normal left ventricular size with severely reduced LV function and an  estimated ejection fraction of approximately 35%. Mild concentric left ventricular hypertrophy. Unable to accurately assess diastolic function due to mitral stenosis    Ballooned out, akinetic apex with all other wall segments being hypokinetic. No evidence of left ventricular mass or thrombus noted. Mild to Moderate mitral valve stenosis (mean pressure gradient of 7 mmHg),  trace mitral regurgitation. Moderately thickened and calcified aortic valve with moderately reduced  leaflet mobility. Individual aortic valve leaflets are not clearly visualized, appears to be   tricuspid though. Moderate aortic stenosis; no evidence of significant aortic regurgitation. The aortic valve area is 1.05 cm2 with a maximum gradient of 52 mmHg and a   mean gradient of 28 mmHg. Right ventricular systolic pressure of 36 mm Hg consistent with mild   pulmonary hypertension. Moderate to severely dilated left atrium. Floppy intra-atrial septum.    Normal intact intra-atrial septum was noted with no evidence of   significant intra-atrial communications by color flow Doppler or by   agitated saline study. Assessment:  Shortness of breath  H/O CAD/MI S/P stent in LAD  Acute on chronic combined systolic and diastolic heart failure with LVEF of 35%  Ballooned out apex may be indicative of Takotsubo cardiomyopathy   Moderate AS  Mild to moderate MS  Mild PHTN  Hypertension  Hyperlipidemia  Macrocytic Anemia      Plan:  Troponin negative   Pro BNP elevated  Continue to monitor on telemetry  Echocardiogram suggestive of EF of 35% with moderate aortic stenosis and mild to moderate mitral stenosis. Continue aspirin, statin, BB  Continue lasix, monitor BMP  Patient may benefit from LifeVest. Discussed the risk and benefits. Agreed to try initially but after discussing with MPOA, they are reluctant due to compliance issues because of her weakness and debility. Patient is DNR    I had a detailed discussion with the patient and / or family regarding the historical points, physical examination findings, and any diagnostic results supporting the admission diagnosis. The patient was educated on care and need for admission. questions were invited and answered. Patient and / or family shows understanding of admission information and agrees to follow. I have discussed the risks, benefits and options with the patient and her family. They appear to understand, have no questions, and wish to proceed. Discussed with patient and family and nursing. I greatly appreciate the opportunity and your confidence in allowing me to participate in the care of Sonia Cisse    Electronically signed by Mckenna Holguin DO on 1/1/23   Interventional cardiologist, [facility] [place].

## 2023-01-06 NOTE — PROGRESS NOTES
Date:2023  Patient: Max Zambrano  : 1930  [facility]:[id number]  CODE:                                                                        PCP:Rigo Persaud    Admit Date: 2022  5:50 PM   LOS: 5 days         Subjective:   1/3 patient seen and examined this a.m. resting in bed, in good spirits noticeably fatigued. Denying any pain, states she had a lot of fluid. Wanting to go home. No adverse events reported overnight. Denies pain, fevers or chills.  seen and evaluated at bedside the peripheral edema is cleared up, discussed with RN at bedside discontinue Lasix drip, presently having wheezing we will start on DuoNeb likely undiagnosed COPD     early in the morning the RN reported the patient has no urine output since last night, seen and evaluated at bedside later in the day after starting IV fluid no peripheral edema the patient having good urine output    Hospital course :Mrs. Max Zambrano is a pleasant 80year old  american lady from home. She confirms that she has stopped taking her lasix at home as she didn't feel good after taking it. She had greatly swollen legs that have remained swollen despite aggressive IV Diuresis. She stated dyspnea to EMS who gave her lasix and as per the ED team she had great success with EMS as she had begun to rapidly diurese with them while en route. Cognitive evaluation noted to be within functional limits, but related to speech therapy for continued diuresis cumulative I's and O's greater than 6 L. Cardiologist with possibilities of the LifeVest placement. Repeat of a chest x-ray revealed mild cardiomegaly with prominence of bronchovascular markings in bilateral lungs. Echocardiogram resulted with an EF of 35%. Review of Systems  ROS: 14 point review of systems is negative except as specifically addressed above.     Objective:      Vital signs in last 24 hours:  Patient Vitals for the past 24 hrs:   BP Temp Temp src Pulse Resp SpO2 Weight   01/05/23 1754 118/60 97.6 °F (36.4 °C) Temporal 76 20 96 % --   01/05/23 1636 124/67 -- -- 75 18 95 % --   01/05/23 1010 (!) 117/56 97.2 °F (36.2 °C) -- 71 18 94 % --   01/05/23 0830 (!) 119/51 97 °F (36.1 °C) Temporal 73 16 91 % --   01/05/23 0824 -- -- -- -- -- -- 122 lb 11.2 oz (55.7 kg)   01/05/23 0718 -- -- -- -- -- 95 % --   01/04/23 2320 (!) 95/50 -- -- -- -- -- --   01/04/23 2230 (!) 88/57 97.5 °F (36.4 °C) Temporal 64 18 99 % --   01/04/23 1958 -- -- -- 68 -- -- --         Patient examined with appropriate PPE  Physical Exam:  Vital Signs: /60   Pulse 76   Temp 97.6 °F (36.4 °C) (Temporal)   Resp 20   Ht 5' (1.524 m)   Wt 122 lb 11.2 oz (55.7 kg)   SpO2 96%   BMI 23.96 kg/m²   General appearance:. Lying comfortably in bed ,   HEENT: Normocephalic , Atraumatic, PERRL, JVP not raised  Chest: On auscultation expiratory wheezing bilaterally, on 2 L oxygen by nasal cannula at baseline  Cardiac: Regular rate and rhythm, S1, S2 normal. No murmurs, gallops, or rubs auscultated. Abdomen:soft, non-tender; normal bowel sounds, no masses, no organomegaly. Urogenital : no cain, no suprapubic tenderness, no CVA tenderness  Extremities: No clubbing or cyanosis. no  peripheral edema over the dependent area and whole of the lower extremities. Peripheral pulses palpable.   Neurologic: Alert and Cooperative , cranial nerves grossly intact, DTR equal, Power  5/5   Psychology: No hallucination, no delusion, appropriate mood          Lab Review   Recent Results (from the past 24 hour(s))   Basic Metabolic Panel    Collection Time: 01/05/23  2:09 AM   Result Value Ref Range    Sodium 135 (L) 136 - 145 mmol/L    Potassium 4.1 3.5 - 5.0 mmol/L    Chloride 90 (L) 98 - 111 mmol/L    CO2 35 (H) 22 - 29 mmol/L    Anion Gap 10 7 - 19 mmol/L    Glucose 91 74 - 109 mg/dL    BUN 20 8 - 23 mg/dL    Creatinine 1.3 (H) 0.5 - 0.9 mg/dL    Est, Glom Filt Rate 38 (A) >60    Calcium 8.6 8.2 - 9.6 mg/dL   Magnesium Collection Time: 01/05/23  2:09 AM   Result Value Ref Range    Magnesium 1.3 (L) 1.7 - 2.3 mg/dL   Urea nitrogen, urine    Collection Time: 01/05/23 10:34 AM   Result Value Ref Range    Urea Nitrogen, Ur 404 mg/dL   Sodium, urine, random    Collection Time: 01/05/23 10:34 AM   Result Value Ref Range    Sodium, Ur 25.0 mmol/L   Creatinine, Random Urine    Collection Time: 01/05/23 10:34 AM   Result Value Ref Range    Creatinine, Ur 140.7 4.2 - 622.0 mg/dL        I/O last 3 completed shifts: In: 607.8 [P.O.:300; I.V.:307.8]  Out: 2450 [Urine:2450]     enoxaparin  30 mg SubCUTAneous Daily    ipratropium-albuterol  1 ampule Inhalation Q4H WA    vitamin B-12  1,000 mcg Oral Daily    aspirin  81 mg Oral Daily    carvedilol  3.125 mg Oral BID WC    atorvastatin  40 mg Oral Nightly    lisinopril  5 mg Oral Daily    sodium chloride flush  5-40 mL IntraVENous 2 times per day          Assessment & Plan       CHF in Exacerbation due to Medication Noncompliance  Cumulative I's and O's greater than 6 L, high.   Patient's respiratory status is improving  Magnesium, phosphorus, TSH within normal limits  BNP 5, 826> trending down  Lipid panel reviewed  Troponin trend unremarkable continue with antihypertensive regimen  Patient continues on diuresis   Cardiology consultation and evaluation echocardiogram resulting in an EF of 35%  Plans for LifeVest  Continue with daily weights, strict I's and O's  1/4 intake and output reviewed 2.8 L of urine output over the last 24-hour peripheral edema has subsides> discontinue Lasix drip    ELVIRA likely prerenal from diuresis  Creatinine 1.3 above baseline of 0.8  1/5 avoid nephrotoxic drug, monitor intake and output, start on IV fluid NS at 100 cc/h for 1 L only    Hypokalemia likely due to diuresis  Magnesium noted to be within normal limits  1/2 suppl continue with supplementation emented orally    Hyperlipidemia  Chronic condition, continue statin therapy     Generalized muscle weakness/at risk for malnutrition   appreciate nutritional evaluations, once optimized from cardiology standpoint will recommend PT/OT evaluation    New diagnosis of COPD  1/4 will start on DuoNeb every 4 hourly and monitor clinically    DVT prophylaxis: Enoxaparin    POA  DNR   Case d/w the RN taking care of the patient  RN  notes reviewed  Consultant notes reviewed     DISPOSITION:    D/C: Home  Estimated D/C Date: TBD        EMR Dragon/Transcription disclaimer:   Much of this encounter note is an electronic transcription/translation of spoken language to printed text.  The electronic translation of spoken language may permit erroneous, or at times, nonsensical words or phrases to be inadvertently transcribed; although attempts have made to review the note for such errors, some may still exist.      Electronically signed by   Shashank Pennington MD   Internal Medicine Hospitalist  On 1/5/2023  At 6:53 PM

## 2023-01-06 NOTE — PROGRESS NOTES
Comprehensive Nutrition Assessment    Type and Reason for Visit:  Reassess    Nutrition Recommendations/Plan:   Continue to encourage po intake and adhering to CHF principles when discharged     Malnutrition Assessment:  Malnutrition Status: At risk for malnutrition (Comment) (01/06/23 1001)    Context:  Acute Illness     Findings of the 6 clinical characteristics of malnutrition:  Energy Intake:  50% or less of estimated energy requirements for 5 or more days  Weight Loss:  No significant weight loss     Body Fat Loss:  No significant body fat loss     Muscle Mass Loss:  No significant muscle mass loss    Fluid Accumulation:  Mild Extremities   Strength:  Not Performed    Nutrition Assessment:    PO intake remains decreased. Taking some of ONS. No new preferences voiced. No changes desired. Nutrition Related Findings:    pro-BNP has improved  Now 1881 Wound Type: Surgical Incision       Current Nutrition Intake & Therapies:    Average Meal Intake: 1-25%  Average Supplements Intake: 1-25%, 26-50%  ADULT DIET; Regular; No Added Salt (3-4 gm); likes overcooked veg, fruit, grd meat with gravy. NO Bread/rice  ADULT ORAL NUTRITION SUPPLEMENT; Lunch, Dinner; Fortified Pudding Oral Supplement    Anthropometric Measures:  Height: 5' (152.4 cm)  Ideal Body Weight (IBW): 100 lbs (45 kg)    Admission Body Weight: 130 lb (59 kg) (stated)  Current Body Weight: 122 lb 11.2 oz (55.7 kg), 122.7 % IBW.  Weight Source: Bed Scale  Current BMI (kg/m2): 24  Usual Body Weight: 130 lb (59 kg) (10/2022)  % Weight Change (Calculated): -9.5  Weight Adjustment For: No Adjustment  BMI Categories: Normal Weight (BMI 22.0 to 24.9) age over 72    Estimated Daily Nutrient Needs:  Energy Requirements Based On: Kcal/kg  Weight Used for Energy Requirements: Current  Energy (kcal/day): 1762-0416 kcals (25-30 kcals/kg)  Weight Used for Protein Requirements: Current  Protein (g/day): 53-107g  Method Used for Fluid Requirements: 1 ml/kcal  Fluid (ml/day): 1726-5539 ml    Nutrition Diagnosis:   Inadequate oral intake, Altered nutrition-related lab values related to partial or complete edentulism, increase demand for energy/nutrients, cardiac dysfunction, acute injury/trauma as evidenced by intake 0-25%, lab values    Nutrition Interventions:   Food and/or Nutrient Delivery: Continue Current Diet, Continue Oral Nutrition Supplement  Nutrition Education/Counseling: Education completed  Coordination of Nutrition Care: Continue to monitor while inpatient, Speech Therapy  Plan of Care discussed with: pt and familly    Goals:  Previous Goal Met: No Progress toward Goal(s)  Goals: Meet at least 75% of estimated needs, PO intake 50% or greater       Nutrition Monitoring and Evaluation:   Behavioral-Environmental Outcomes: Readiness for Change  Food/Nutrient Intake Outcomes: Food and Nutrient Intake, Supplement Intake  Physical Signs/Symptoms Outcomes: Biochemical Data, Chewing or Swallowing, Fluid Status or Edema, Weight, Skin    Discharge Planning:    Continue current diet, Continue Oral Nutrition Supplement     De Danielle MS, RD, LD  Contact: [phone]

## 2023-01-07 NOTE — CARE COORDINATION
Spoke with patient regarding MD orders for [facility] services. Patient agreeable and has chosen [facility]. Referral Faxed. [address] [place] [address] [phone]. [phone]. Please notify [address] [place] [address] when patient discharges and fax DC Summary,  DC med list and any new [facility] orders. The Patient and/or patient representative, Anita Cardona Kelsey pauline, was provided with a choice of provider and agrees   with the discharge plan. [x] Yes [] No    Freedom of choice list was provided with basic dialogue that supports the patient's individualized plan of care/goals, treatment preferences and shares the quality data associated with the providers.  [x] Yes [] No  Electronically signed by Jamarcus Romeo RN on 1/6/2023 at 6:23 PM

## 2023-01-09 NOTE — DISCHARGE INSTRUCTIONS
[address] [facility]   Physician Orders and Discharge Instructions  [address]  Telephone: [phone]    NAME:  Og Sterling  YOB: 1930  MEDICAL RECORD NUMBER:  [id number]  DATE:  1/9/2023    Discharge condition: Stable    Discharge to: Home    Left via:Private automobile    Accompanied by:  friend    ECF/[facility]: [facility]    Dressing Orders:   Left heel wound: Soap and water wash. Apply a double layer of Xeroform (the yellow sticky dressing) over the wound bed daily. Secure with dry gauze and tape daily. Treatment Orders:   Avoid Pressure to wound site. Off-load heels by applying heel-lift boots or \"float\" heels by placing pillows under calves so that heels do not touch mattress. Continue Protein rich diet (unless restricted by your physician)   Take Multivitamin daily     Please use Roho cushion in chair   Please use low air loss bed    [facility] follow up visit _______4 weeks with Shanon______________________  (Please note your next appointment above and if you are unable to keep, kindly give a 24 hour notice. Thank you.)          If you experience any of the following, please call the [facility] during business hours:    * Increase in Pain  * Temperature over 101  * Increase in drainage from your wound  * Drainage with a foul odor  * Bleeding  * Increase in swelling  * Need for compression bandage changes due to slippage, breakthrough drainage. If you need medical attention outside of the business hours of the [facility] please contact your PCP or go to the nearest emergency room.

## 2023-01-10 ENCOUNTER — HOSPITAL ENCOUNTER (OUTPATIENT)
Dept: WOUND CARE | Age: 88
Discharge: HOME | End: 2023-01-10
Payer: MEDICARE

## 2023-01-10 VITALS
TEMPERATURE: 97.7 F | SYSTOLIC BLOOD PRESSURE: 122 MMHG | BODY MASS INDEX: 23.95 KG/M2 | HEIGHT: 60 IN | HEART RATE: 84 BPM | RESPIRATION RATE: 20 BRPM | DIASTOLIC BLOOD PRESSURE: 54 MMHG | WEIGHT: 122 LBS

## 2023-01-10 DIAGNOSIS — L89.610 UNSTAGEABLE PRESSURE ULCER OF RIGHT HEEL (HCC): ICD-10-CM

## 2023-01-10 DIAGNOSIS — L89.620 UNSTAGEABLE PRESSURE ULCER OF LEFT HEEL (HCC): ICD-10-CM

## 2023-01-10 DIAGNOSIS — I73.9 PAD (PERIPHERAL ARTERY DISEASE) (HCC): Primary | ICD-10-CM

## 2023-01-10 PROCEDURE — 97597 DBRDMT OPN WND 1ST 20 CM/<: CPT

## 2023-01-10 PROCEDURE — 97597 DBRDMT OPN WND 1ST 20 CM/<: CPT | Performed by: NURSE PRACTITIONER

## 2023-01-10 RX ORDER — LIDOCAINE 50 MG/G
OINTMENT TOPICAL ONCE
OUTPATIENT
Start: 2023-01-10 | End: 2023-01-10

## 2023-01-10 RX ORDER — LIDOCAINE HYDROCHLORIDE 20 MG/ML
JELLY TOPICAL ONCE
OUTPATIENT
Start: 2023-01-10 | End: 2023-01-10

## 2023-01-10 RX ORDER — LIDOCAINE HYDROCHLORIDE 40 MG/ML
SOLUTION TOPICAL ONCE
OUTPATIENT
Start: 2023-01-10 | End: 2023-01-10

## 2023-01-10 RX ORDER — LIDOCAINE 40 MG/G
CREAM TOPICAL ONCE
OUTPATIENT
Start: 2023-01-10 | End: 2023-01-10

## 2023-01-10 NOTE — PROGRESS NOTES
[address]   Progress Note and Procedure Note      Michael Bowie RECORD NUMBER:  [id number]  AGE: 80 y.o. GENDER: female  : 1930  EPISODE DATE:  1/10/2023    Subjective:     Chief Complaint   Patient presents with    Wound Check     Left heel wound      HISTORY of PRESENT ILLNESS JULIAN Saavedra is a 80 y.o. female who presents today for wound/ulcer evaluation.    History of Wound Context: right heel wound follow up/eval and treat    Ulcer Identification:  Ulcer Type: pressure  Contributing Factors: chronic pressure and shear force    Wound: Abrasion        PAST MEDICAL HISTORY        Diagnosis Date    CAD (coronary artery disease) 2013    Cardiomyopathy ([address] 2013    EF 25%    CHF (congestive heart failure) ([facility])     Hyperlipemia     Dr. Aleyda eSbastian manages    Palliative care patient 2023    Status post coronary artery stent placement 2013    LAD       PAST SURGICAL HISTORY    Past Surgical History:   Procedure Laterality Date    APPENDECTOMY      BACK SURGERY      CARDIAC CATHETERIZATION  2013   [facility]    EF 20-25%    TONSILLECTOMY         FAMILY HISTORY    Family History   Problem Relation Age of Onset    Cancer Sister        SOCIAL HISTORY    Social History     Tobacco Use    Smoking status: Never    Smokeless tobacco: Never   Vaping Use    Vaping Use: Never used   Substance Use Topics    Alcohol use: Not Currently     Comment: \"Maybe a few drinks years ago but I don't drink anymore\"    Drug use: No       ALLERGIES    Allergies   Allergen Reactions    Codeine      Sick to her stomach    Cortizone      Sick to her stomach    Penicillins      Sick to her stomach    Sulfa Antibiotics      Sick to her stomach       MEDICATIONS    Current Outpatient Medications on File Prior to Encounter   Medication Sig Dispense Refill    ipratropium-albuterol (DUONEB) 0.5-2.5 (3) MG/3ML SOLN nebulizer solution Inhale 3 mLs into the lungs in the morning and 3 mLs at noon and 3 mLs in the evening and 3 mLs before bedtime. 360 mL 0    polyethylene glycol (GLYCOLAX) 17 g packet Take 17 g by mouth daily as needed for Constipation or Other (No BM in 60 hours) 527 g 1    cyanocobalamin 1000 MCG tablet Take 1 tablet by mouth daily 30 tablet 3    furosemide (LASIX) 40 MG tablet Take 0.5 tablets by mouth daily for 10 days Need reassessment with labs and vitals after 10 days in PCPs office 5 tablet 0    carvedilol (COREG) 3.125 MG tablet TAKE 1 TABLET BY MOUTH 2 TIMES DAILY (WITH MEALS) 60 tablet 0    lisinopril (PRINIVIL;ZESTRIL) 5 MG tablet Take 1 tablet by mouth daily 30 tablet 3    pravastatin (PRAVACHOL) 40 MG tablet Take 40 mg by mouth daily. HYDROcodone-acetaminophen (NORCO) 5-325 MG per tablet Take 1 tablet by mouth every 6 hours as needed for Pain. aspirin 81 MG tablet Take 81 mg by mouth daily. No current facility-administered medications on file prior to encounter. REVIEW OF SYSTEMS    Pertinent items are noted in HPI.     Objective:      BP (!) 122/54   Pulse 84   Temp 97.7 °F (36.5 °C) (Temporal)   Resp 20   Ht 5' (1.524 m)   Wt 122 lb (55.3 kg)   BMI 23.83 kg/m²     Wt Readings from Last 3 Encounters:   01/10/23 122 lb (55.3 kg)   01/05/23 122 lb 11.2 oz (55.7 kg)   12/13/22 130 lb (59 kg)       PHYSICAL EXAM    General Appearance: alert and oriented to person, place and time, well developed and well- nourished, in no acute distress  Skin: warm and dry, no rash or erythema  Head: normocephalic and atraumatic  Eyes: pupils equal, round, and reactive to light, extraocular eye movements intact, conjunctivae normal  ENT: tympanic membrane, external ear and ear canal normal bilaterally, nose without deformity, nasal mucosa and turbinates normal without polyps  Neck: supple and non-tender without mass, no thyromegaly or thyroid nodules, no cervical lymphadenopathy  Pulmonary/Chest: clear to auscultation bilaterally- no wheezes, rales or rhonchi, normal air movement, no respiratory distress  Extremities: no cyanosis, clubbing or edema  Musculoskeletal: normal range of motion, no joint swelling, deformity or tenderness  Neurologic: reflexes normal and symmetric, no cranial nerve deficit, gait, coordination and speech normal      Assessment:      Patient Active Problem List   Diagnosis Code    Ischemic dilated cardiomyopathy ([facility]) I25.5, I42.0    Status post coronary artery stent placement Z95.5    Mixed hyperlipidemia E78.2    CAP (community acquired pneumonia) J18.9    Cholelithiasis K80.20    Diverticulosis K57.90    Hyperlipemia E78.5    Chronic systolic congestive heart failure ([facility]) I50.22    Acute respiratory failure with hypoxia ([facility]) J96.01    AAA (abdominal aortic aneurysm) I71.40    Descending thoracic aortic aneurysm I71.23    Pneumonia of right lower lobe due to infectious organism J18.9    Calculus of gallbladder without cholecystitis without obstruction K80.20    Coronary artery disease involving native heart without angina pectoris I25.10    Diverticulosis of intestine without bleeding K57.90    Hyperlipidemia E78.5    Unstageable pressure ulcer of left heel ([facility]) L89.620    Unstageable pressure ulcer of right heel ([facility]) L89.610    PAD (peripheral artery disease) ([facility]) I73.9    Medically noncompliant H55.230    Systolic and diastolic CHF, acute on chronic ([facility]) I50.43    Palliative care patient Z51.5       Wound 10/19/22 Heel Left wound 1- left heel unstagable (Active)   Wound Image   01/10/23 1319   Wound Etiology Pressure Unstageable 01/10/23 1319   Dressing Status Clean;Dry; Intact 01/10/23 1319   Wound Cleansed Soap and water 01/10/23 1319   Dressing/Treatment Xeroform;Dry dressing 01/10/23 1335   Dressing Change Due 01/06/23 01/05/23 1648   Wound Length (cm) 2.5 cm 01/10/23 1319   Wound Width (cm) 2.1 cm 01/10/23 1319   Wound Depth (cm) 0.1 cm 01/10/23 1319   Wound Surface Area (cm^2) 5.25 cm^2 01/10/23 1319   Change in Wound Size % (l*w) 73.75 01/10/23 1319   Wound Volume (cm^3) 0.525 cm^3 01/10/23 1319   Wound Healing % 74 01/10/23 1319   Post-Procedure Length (cm) 2.2 cm 01/10/23 1335   Post-Procedure Width (cm) 1.8 cm 01/10/23 1335   Post-Procedure Depth (cm) 0.2 cm 01/10/23 1335   Post-Procedure Surface Area (cm^2) 3.96 cm^2 01/10/23 1335   Post-Procedure Volume (cm^3) 0.792 cm^3 01/10/23 1335   Wound Assessment Eschar dry 01/10/23 1319   Drainage Amount Moderate 01/10/23 1319   Drainage Description Serosanguinous 01/10/23 1319   Odor None 01/10/23 1319   Nikki-wound Assessment Intact 01/10/23 1319   Margins Attached edges; Defined edges 01/10/23 1319   Wound Thickness Description not for Pressure Injury Full thickness 01/10/23 1319   Number of days: 83           Procedure Note  Indications:  Based on my examination of this patient's wound(s)/ulcer(s) today, debridement is required to promote healing and evaluate the wound base. Performed by: IZA Singh CNP    Consent obtained:  Yes    Time out taken:  Yes    Pain Control:         Debridement:Non-excisional Debridement    Using #15 blade scalpel and forceps the wound(s)/ulcer(s) was/were sharply debrided down through and including the removal of epidermis and dermis. Devitalized Tissue Debrided:  fibrin, biofilm, slough, necrotic/eschar, exudate, and callus    Pre Debridement Measurements:  Are located in the Wound/Ulcer Documentation Flow Sheet    Wound/Ulcer #: 1    Post Debridement Measurements:  Wound/Ulcer Descriptions are Pre Debridement except measurements:      Percent of Wound/Ulcer Debrided: 100%    Total Surface Area Debrided: 3.96 sq cm     Estimated Blood Loss:  None    Hemostasis Achieved:  not needed    Procedural Pain:  0  / 10     Post Procedural Pain:  0 / 10     Response to treatment:  Well tolerated by patient.          Diabetic/Pressure/Non Pressure Ulcers only:  Ulcer: Pressure ulcer, unstageable            Plan:     Problem List Items Addressed This Visit          Circulatory    PAD (peripheral artery disease) (HCC) - Primary       Other    * (Principal) Unstageable pressure ulcer of left heel (HCC)    Unstageable pressure ulcer of right heel ([address]       Treatment Note please see attached Discharge Instructions    In my professional opinion this patient would benefit from HBO Therapy: No    Written patient dismissal instructions given to patient and signed by patient or POA. Ms. Dang Naik is slowly healing. She insists her wound is draining purulent drainage and requests the scab be removed. Everything looks ok I only note slough under the eschar, we will treat with xeroform. Follow up in 4 weeks.   Electronically signed by IZA Osborn CNP on 1/10/2023 at 1:53 PM

## 2023-01-10 NOTE — HOME CARE
[address] [facility]. [address] Tarik Bouchra Adams [address] [phone] f:[phone]     Ordering Center:     [address] [place] [address],[facility] [address]  [address] [facility], [facility] [address] [id number] [phone]  WOUND CARE Dept: [address] NUMBER [phone]    Patient Information:      Stephanie Farfan [address] [facility]   [phone]   : 1930  AGE: 80 y.o. GENDER: female   EPISODE DATE: 1/10/2023    Insurance:      PRIMARY INSURANCE:  Plan: [employer] PLUS HMO  Coverage: [employer] MEDICARE  Effective Date: 2023  Group Number: [unfilled]  Subscriber Number: [id number] - (Medicare)    Payer/Plan Subscr  Sex Relation Sub. Ins. ID Effective Group Num   1. Jesuth 1930 Female Self [id number]                                    [address] [id number]       Patient Wound Information:      Problem List Items Addressed This Visit          Circulatory    PAD (peripheral artery disease) ([facility] - Primary       Other    * (Principal) Unstageable pressure ulcer of left heel (HCC)    Unstageable pressure ulcer of right heel ([facility]       WOUNDS REQUIRING DRESSING SUPPLIES:     Wound 10/19/22 Heel Left wound 1- left heel unstagable (Active)   Wound Image   01/10/23 1319   Wound Etiology Pressure Unstageable 01/10/23 1319   Dressing Status Clean;Dry; Intact 01/10/23 1319   Wound Cleansed Soap and water 01/10/23 1319   Dressing/Treatment Xeroform;Dry dressing 01/10/23 1335   Dressing Change Due 23 1648   Wound Length (cm) 2.5 cm 01/10/23 1319   Wound Width (cm) 2.1 cm 01/10/23 1319   Wound Depth (cm) 0.1 cm 01/10/23 1319   Wound Surface Area (cm^2) 5.25 cm^2 01/10/23 1319   Change in Wound Size % (l*w) 73.75 01/10/23 1319   Wound Volume (cm^3) 0.525 cm^3 01/10/23 1319   Wound Healing % 74 01/10/23 1319   Post-Procedure Length (cm) 2.2 cm 01/10/23 1335   Post-Procedure Width (cm) 1.8 cm 01/10/23 1335   Post-Procedure Depth (cm) 0.2 cm 01/10/23 1335   Post-Procedure Surface Area (cm^2) 3.96 cm^2 01/10/23 1335   Post-Procedure Volume (cm^3) 0.792 cm^3 01/10/23 1335   Wound Assessment Eschar dry 01/10/23 1319   Drainage Amount Moderate 01/10/23 1319   Drainage Description Serosanguinous 01/10/23 1319   Odor None 01/10/23 1319   Nikki-wound Assessment Intact 01/10/23 1319   Margins Attached edges; Defined edges 01/10/23 1319   Wound Thickness Description not for Pressure Injury Full thickness 01/10/23 1319   Number of days: 83          Supplies Requested :      WOUND #: 1   PRIMARY DRESSING:  Other: xeroform   Cover and Secure with: 4X4 non woven gauze pad     FREQUENCY OF DRESSING CHANGES:  Daily         ADDITIONAL ITEMS:  [] Gloves Small  [] Gloves Medium [x] Gloves Large [] Gloves XLarge  [] Tape 1\" [] Tape 2\" [x] Tape 3\"  [x] Medipore Tape  [x] Saline  [] Skin Prep   [] Adhesive Remover   [] Cotton Tip Applicators   [] Other:    Patient Wound(s) Debrided: [x] Yes if yes please add date 1/10/23 [] No    Debribement Type: Excisional/Sharp and Mechanical     Is the patient currently on an antibiotic for their Wound(s): [] Yes if yes please add name and dose    [x] No    Patient currently being seen by Home Health: [] Yes   [x] No    Duration for needed supplies:  []15  [x]30  []60  []90 Days    Electronically signed by IZA Tello CNP on 1/10/2023 at 1:56 PM     Provider Information:      PROVIDER'S NAME: Jennifer COUCH    NPI: [id number]  Dispense as written

## 2023-01-25 ENCOUNTER — TELEPHONE (OUTPATIENT)
Dept: CARDIOTHORACIC SURGERY | Age: 88
End: 2023-01-25

## 2023-01-25 LAB — PRO-BNP: 4710 PG/ML (ref 0–1800)

## 2023-01-25 NOTE — TELEPHONE ENCOUNTER
[facility] CHF nurse Aracely Vaz calling to report Large increase in bilat lower ext. Edema knee down. She has had no change in weight per family. No SOA noted. Lungs clear. Pt c/o wheeze but [facility] nurse does not hear any. BNP done today and should come to cardiology fax machine. She is taking diuretic but skipped one 2 days ago. Pitting edema today is 3-4 and was only 1-2 pitting edema prior visit. Family insist there is no wt. Gain but [facility] nurse did not weigh pt. She has an appt. to come see you 1/31.

## 2023-01-26 NOTE — TELEPHONE ENCOUNTER
Call placed to [facility]. Spoke with her caregiver Franklyn Burden. He voiced understanding of medication increase for today and tomorrow. Verbalized also her upcoming appointment. Encouraged them to call Cardiology office if increased lasix does not improve edema.

## 2023-01-30 LAB — PRO-BNP: 4619 PG/ML (ref 0–1800)

## 2023-02-02 ENCOUNTER — TELEPHONE (OUTPATIENT)
Dept: CARDIOTHORACIC SURGERY | Age: 88
End: 2023-02-02

## 2023-02-02 NOTE — TELEPHONE ENCOUNTER
Patient states her weight is still the same. She is wrapping her feet. Discussed putting legs up on a pillow to raise them a little higher. She understands to try to maintain a low sodium diet. She states HH has stopped coming until she is able to participate more in therapy. When she gets her foot better and can walk better they will come back out. Reminded pt of up coming visit on 2/8 and importance of coming to see Herlinda Mora.

## 2023-02-08 ENCOUNTER — OFFICE VISIT (OUTPATIENT)
Dept: CARDIOLOGY CLINIC | Age: 88
End: 2023-02-08
Payer: MEDICARE

## 2023-02-08 VITALS
OXYGEN SATURATION: 95 % | DIASTOLIC BLOOD PRESSURE: 60 MMHG | HEIGHT: 60 IN | HEART RATE: 65 BPM | WEIGHT: 118 LBS | SYSTOLIC BLOOD PRESSURE: 124 MMHG | BODY MASS INDEX: 23.16 KG/M2

## 2023-02-08 DIAGNOSIS — I25.10 CORONARY ARTERY DISEASE INVOLVING NATIVE CORONARY ARTERY OF NATIVE HEART WITHOUT ANGINA PECTORIS: Primary | ICD-10-CM

## 2023-02-08 DIAGNOSIS — I42.0 ISCHEMIC DILATED CARDIOMYOPATHY (HCC): ICD-10-CM

## 2023-02-08 DIAGNOSIS — I25.5 ISCHEMIC DILATED CARDIOMYOPATHY (HCC): ICD-10-CM

## 2023-02-08 DIAGNOSIS — I50.22 CHRONIC SYSTOLIC CONGESTIVE HEART FAILURE (HCC): ICD-10-CM

## 2023-02-08 DIAGNOSIS — I35.0 AORTIC VALVE STENOSIS, ETIOLOGY OF CARDIAC VALVE DISEASE UNSPECIFIED: ICD-10-CM

## 2023-02-08 LAB
ANION GAP SERPL CALCULATED.3IONS-SCNC: 8 MMOL/L (ref 7–19)
BUN BLDV-MCNC: 16 MG/DL (ref 8–23)
CALCIUM SERPL-MCNC: 8.9 MG/DL (ref 8.2–9.6)
CHLORIDE BLD-SCNC: 107 MMOL/L (ref 98–111)
CO2: 30 MMOL/L (ref 22–29)
CREAT SERPL-MCNC: 1 MG/DL (ref 0.5–0.9)
GFR SERPL CREATININE-BSD FRML MDRD: 53 ML/MIN/{1.73_M2}
GLUCOSE BLD-MCNC: 91 MG/DL (ref 74–109)
POTASSIUM SERPL-SCNC: 4.3 MMOL/L (ref 3.5–5)
SODIUM BLD-SCNC: 145 MMOL/L (ref 136–145)

## 2023-02-08 PROCEDURE — 1036F TOBACCO NON-USER: CPT | Performed by: CLINICAL NURSE SPECIALIST

## 2023-02-08 PROCEDURE — 1090F PRES/ABSN URINE INCON ASSESS: CPT | Performed by: CLINICAL NURSE SPECIALIST

## 2023-02-08 PROCEDURE — G8427 DOCREV CUR MEDS BY ELIG CLIN: HCPCS | Performed by: CLINICAL NURSE SPECIALIST

## 2023-02-08 PROCEDURE — G8484 FLU IMMUNIZE NO ADMIN: HCPCS | Performed by: CLINICAL NURSE SPECIALIST

## 2023-02-08 PROCEDURE — 99214 OFFICE O/P EST MOD 30 MIN: CPT | Performed by: CLINICAL NURSE SPECIALIST

## 2023-02-08 PROCEDURE — 1123F ACP DISCUSS/DSCN MKR DOCD: CPT | Performed by: CLINICAL NURSE SPECIALIST

## 2023-02-08 PROCEDURE — G8420 CALC BMI NORM PARAMETERS: HCPCS | Performed by: CLINICAL NURSE SPECIALIST

## 2023-02-08 NOTE — PROGRESS NOTES
[address] [facility]  [facility] [place]  [address]  Phone: [phone]  Fax: [phone]    OFFICE VISIT:  2023    Roe Finch - : 1930    Reason For Visit:  Dave Moore is a 80 y.o. female who is here for Follow-Up from Hospital (No Cardiac Sx/) and Congestive Heart Failure  Patient was admitted 2022 with acute on chronic heart failure after stopping her Lasix. She was diuresed  Known coronary disease with LV dysfunction with EF at 25% back in     She reports that she has been following with her primary care doctor and doing well. She stopped taking her Lasix and got overloaded with fluid. She is here today accompanied with her neighbor. Her neighbor's been helping care for her. He has been transporting her back to all her appointments. She goes to wound care due to wound/ulcer on her left heel. She reports that she is weighing every day. Weight has been fairly stable now right around 117 pounds  Currently taking Lasix 20 mg. Still has swelling in her legs. Trying to keep them elevated is much as possible. She is back to doing some light housework      Subjective  Dave Moore denies exertional chest pain, resting shortness of breath, orthopnea, paroxysmal nocturnal dyspnea, syncope, presyncope, arrhythmia,   The patient denies numbness or weakness to suggest cerebrovascular accident or transient ischemic attack. Birdie Stuart is PCP and follows labs.   Roe Finch has the following history as recorded in [facility]:    Patient Active Problem List    Diagnosis Date Noted    Palliative care patient 2023    Medically noncompliant     Systolic and diastolic CHF, acute on chronic ([address] 2022    PAD (peripheral artery disease) ([address] 10/24/2022    Unstageable pressure ulcer of left heel ([address] 10/20/2022    Unstageable pressure ulcer of right heel ([address] 10/20/2022    Calculus of gallbladder without cholecystitis without obstruction     Coronary artery disease involving native heart without angina pectoris     Diverticulosis of intestine without bleeding     Hyperlipidemia     Pneumonia of right lower lobe due to infectious organism     CAP (community acquired pneumonia) 03/29/2016    Cholelithiasis 03/29/2016    Diverticulosis 03/29/2016    Hyperlipemia 03/29/2016    Chronic systolic congestive heart failure [facility] 03/29/2016    Acute respiratory failure with hypoxia ([facility] 03/29/2016    AAA (abdominal aortic aneurysm) 03/29/2016    Descending thoracic aortic aneurysm 03/29/2016    Ischemic dilated cardiomyopathy ([facility] 06/12/2013    Status post coronary artery stent placement 06/12/2013    Mixed hyperlipidemia      Past Medical History:   Diagnosis Date    CAD (coronary artery disease) 06/12/2013    Cardiomyopathy ([facility] 06/12/2013    EF 25%    CHF (congestive heart failure) ([facility])     Hyperlipemia     Dr. Eusebio Oneal manages    Palliative care patient 01/02/2023    Status post coronary artery stent placement 06/12/2013    LAD     Past Surgical History:   Procedure Laterality Date    APPENDECTOMY      BACK SURGERY      [place]ARDIAC [place]    EF 20-25%    TONSILLECTOMY       Family History   Problem Relation Age of Onset    Cancer Sister      Social History     Tobacco Use    Smoking status: Never    Smokeless tobacco: Never   Substance Use Topics    Alcohol use: Not Currently     Comment: \"Maybe a few drinks years ago but I don't drink anymore\"      Current Outpatient Medications   Medication Sig Dispense Refill    ipratropium-albuterol (DUONEB) 0.5-2.5 (3) MG/3ML SOLN nebulizer solution Inhale 3 mLs into the lungs in the morning and 3 mLs at noon and 3 mLs in the evening and 3 mLs before bedtime.  360 mL 0    cyanocobalamin 1000 MCG tablet Take 1 tablet by mouth daily 30 tablet 3    furosemide (LASIX) 40 MG tablet Take 0.5 tablets by mouth daily for 10 days Need reassessment with labs and vitals after 10 days in PCPs office 5 tablet 0    carvedilol (COREG) 3.125 MG tablet TAKE 1 TABLET BY MOUTH 2 TIMES DAILY (WITH MEALS) 60 tablet 0    lisinopril (PRINIVIL;ZESTRIL) 5 MG tablet Take 1 tablet by mouth daily 30 tablet 3    pravastatin (PRAVACHOL) 40 MG tablet Take 40 mg by mouth daily. HYDROcodone-acetaminophen (NORCO) 5-325 MG per tablet Take 1 tablet by mouth every 6 hours as needed for Pain. aspirin 81 MG tablet Take 81 mg by mouth daily. No current facility-administered medications for this visit. Allergies: Codeine, Cortizone, Penicillins, and Sulfa antibiotics    Review of Systems  Constitutional - no significant activity change, appetite change, or unexpected weight change. No fever, chills or diaphoresis. No fatigue. HEENT - no significant rhinorrhea or epistaxis. No tinnitus or significant hearing loss. Eyes - no sudden vision change or amaurosis. Respiratory - + occasional wheezing, no stridor, apnea or cough. + dyspnea on exertion or shortness of breath. Cardiovascular - no exertional chest pain, orthopnea or PND. No sensation of arrhythmia or slow heart rate. No claudication or leg edema. Gastrointestinal - no abdominal swelling or pain. No blood in stool. No severe constipation, diarrhea, nausea, or vomiting. Genitourinary - no difficulty urinating, dysuria, frequency, or urgency. No flank pain or hematuria. Musculoskeletal - no back pain, gait disturbance, or myalgia. Skin - no color change or rash. No pallor.  + Pressure ulcer on left heel  Neurologic - no speech difficulty, facial asymmetry or lateralizing weakness. No seizures, presyncope, syncope, or significant dizziness. Hematologic - no easy bruising or excessive bleeding. Psychiatric - no severe anxiety or insomnia. No confusion. All other review of systems are negative. Objective  Vital Signs - /60   Pulse 65   Ht 5' (1.524 m)   Wt 118 lb (53.5 kg)   SpO2 95%   BMI 23.05 kg/m²   General - Brooks Sorto is alert, cooperative, and pleasant.   Well groomed. No acute distress. Body habitus is normal.  HEENT - The head is normocephalic. No circumoral cyanosis. Dentition is normal.   EYES -  No Xanthelasma, no arcus senilis, no conjunctival hemorrhages or discharge. Neck - Supple, without increased jugular venous pressures. No carotid bruits. No mass. Respiratory - Lungs are clear bilaterally. No wheezes or rales. Normal effort without use of accessory muscles. Cardiovascular - Heart has regular rhythm and rate. 3/6 systolic murmur. No  rubs or gallops. + pedal pulses and no varicosities. Abdominal -  Soft, nontender, nondistended. Bowel sounds are intact. Extremities - No clubbing, cyanosis, or  edema. Musculoskeletal -  No clubbing . No Osler's nodes. Gait -in wheelchair. No kyphosis or scoliosis. Skin -  no statis ulcers or dermatitis. Neurological - No focal signs are identified. Oriented to person, place and time. Psychiatric -  Appropriate affect and mood. Assessment:     Diagnosis Orders   1. Coronary artery disease involving native coronary artery of native heart without angina pectoris        2. Chronic systolic congestive heart failure ([facility]        3. Ischemic dilated cardiomyopathy ([facility]        4. Aortic valve stenosis, etiology of cardiac valve disease unspecified          Data:  BP Readings from Last 3 Encounters:   02/08/23 124/60   01/10/23 (!) 122/54   01/06/23 (!) 116/54    Pulse Readings from Last 3 Encounters:   02/08/23 65   01/10/23 84   01/06/23 84        Wt Readings from Last 3 Encounters:   02/08/23 118 lb (53.5 kg)   01/10/23 122 lb (55.3 kg)   01/05/23 122 lb 11.2 oz (55.7 kg)     Blood pressure and heart rate controlled. Medical management includes low-dose carvedilol and lisinopril. Currently taking 20 Lasix daily    Patient has been weighing daily. Has been staying stable around 117 pounds. Has home health.   Peripheral edema increased and diuretic increased for couple days  Has neighbor that is helping her get to her appointments and her care. Using her nebulizer treatments twice a day. This helps with any wheezing. She is trying to get around the house little bit better. She is trying to keep her feet elevated and eat less foods with sodium like potato chips. We discussed when and how to take extra diuretic     Reviewed  recent notes   Reviewed recent labs      Lab Results   Component Value Date/Time     02/08/2023 11:45 AM    K 4.3 02/08/2023 11:45 AM    K 3.4 01/06/2023 09:18 AM     02/08/2023 11:45 AM    CO2 30 02/08/2023 11:45 AM    BUN 16 02/08/2023 11:45 AM    CREATININE 1.0 02/08/2023 11:45 AM    GLUCOSE 91 02/08/2023 11:45 AM    CALCIUM 8.9 02/08/2023 11:45 AM      Echo 1/1/2023-essentially stable LV function since 2013. We will continue to monitor AS    Normal left ventricular size with severely reduced LV function and an   estimated ejection fraction of approximately 35%. Mild concentric left ventricular hypertrophy. Unable to accurately assess diastolic function due to mitral stenosis. Ballooned out, akinetic apex with all other wall segments being   hypokinetic. No evidence of left ventricular mass or thrombus noted. Mild to Moderate mitral valve stenosis (mean pressure gradient of 7 mmHg),   trace mitral regurgitation. Moderately thickened and calcified aortic valve with moderately reduced   leaflet mobility. Individual aortic valve leaflets are not clearly visualized, appears to be   tricuspid though. Moderate aortic stenosis; no evidence of significant aortic regurgitation. The aortic valve area is 1.05 cm2 with a maximum gradient of 52 mmHg and a   mean gradient of 28 mmHg. Right ventricular systolic pressure of 36 mm Hg consistent with mild   pulmonary hypertension. Moderate to severely dilated left atrium. Floppy intra-atrial septum.    Normal intact intra-atrial septum was noted with no evidence of   significant intra-atrial communications by color flow Doppler or by   agitated saline study. -------------------------------------   Electronically signed by Maria Luisa Smith DO(Interpreting   physician) on 01/01/2023 08:07 PM   ----------------------------------------------------------------        States taking medications as prescribed  Stable cardiovascular status. No evidence of overt heart failure, angina or dysrhythmia. 30 minutes were spent preparing, reviewing and seeing patient. All questions answered    Plan    Weigh daily:  Learn what your \"dry\" or \"ideal\" weight is - Dry weight is your weight without extra water (fluid). Weigh yourself at the same time each day, preferably in the morning, in similar clothing, after urinating but before eating, and on the same scale. Record your weight in a diary or calendar. If you gain two pounds in a day or three pounds in two days, double your water pill until you are back down to your dry weight. Maintain good blood pressure control-goal<130/80 at rest  Maintain good cholesterol control LDL goal<70 with arterial disease  If you are diabetic work to keep/obtain hemoglobin A1c< 7    Follow up in 3 mos   Call with any questions or concerns  Follow up with Bj Numbers for non cardiac problems and labs   Follow-up with wound care as planned  Report any new problems  Cardiovascular Fitness-Exercise as tolerated. Fall precautions   Cardiac / Healthy Diet- Avoid processed high fat foods, maintain low sodium/salt   Continue current medications as directed  Continue plan of treatment  It is always recommended that you bring your medications bottles with you to each visit - this is for your safety! IZA Carlin    EMR dragon/transcription disclaimer: Much of this encounter note is electronic transcription/translation of spoken language to printed tach.  Electronic translation of spoken language may be erroneous, or at times, nonsensical words or phrases may be inadvertently transcribed.  Although, I have reviewed the note for such errors, some may still exist.

## 2023-02-08 NOTE — PATIENT INSTRUCTIONS
Weigh daily:  Learn what your \"dry\" or \"ideal\" weight is - Dry weight is your weight without extra water (fluid). Weigh yourself at the same time each day, preferably in the morning, in similar clothing, after urinating but before eating, and on the same scale. Record your weight in a diary or calendar. If you gain two pounds in a day or three pounds in two days, double your water pill until you are back down to your dry weight. Maintain good blood pressure control-goal<130/80 at rest  Maintain good cholesterol control LDL goal<70 with arterial disease  If you are diabetic work to keep/obtain hemoglobin A1c< 7    Follow up in 3 mos   Call with any questions or concerns  Follow up with Jeanette Brennan for non cardiac problems  Report any new problems  Cardiovascular Fitness-Exercise as tolerated. Fall precautions   Cardiac / Healthy Diet- Avoid processed high fat foods, maintain low sodium/salt   Continue current medications as directed  Continue plan of treatment  It is always recommended that you bring your medications bottles with you to each visit - this is for your safety!

## 2023-02-13 ENCOUNTER — HOSPITAL ENCOUNTER (OUTPATIENT)
Dept: WOUND CARE | Age: 88
Discharge: HOME | End: 2023-02-13
Payer: MEDICARE

## 2023-02-13 VITALS
HEIGHT: 60 IN | SYSTOLIC BLOOD PRESSURE: 121 MMHG | RESPIRATION RATE: 20 BRPM | BODY MASS INDEX: 23.16 KG/M2 | HEART RATE: 65 BPM | DIASTOLIC BLOOD PRESSURE: 60 MMHG | WEIGHT: 118 LBS | TEMPERATURE: 98.5 F

## 2023-02-13 DIAGNOSIS — I73.9 PAD (PERIPHERAL ARTERY DISEASE) (HCC): Primary | ICD-10-CM

## 2023-02-13 DIAGNOSIS — L89.620 UNSTAGEABLE PRESSURE ULCER OF LEFT HEEL (HCC): ICD-10-CM

## 2023-02-13 DIAGNOSIS — L89.610 UNSTAGEABLE PRESSURE ULCER OF RIGHT HEEL (HCC): ICD-10-CM

## 2023-02-13 PROCEDURE — 97597 DBRDMT OPN WND 1ST 20 CM/<: CPT | Performed by: NURSE PRACTITIONER

## 2023-02-13 PROCEDURE — 97597 DBRDMT OPN WND 1ST 20 CM/<: CPT

## 2023-02-13 RX ORDER — LIDOCAINE HYDROCHLORIDE 20 MG/ML
JELLY TOPICAL ONCE
OUTPATIENT
Start: 2023-02-13 | End: 2023-02-13

## 2023-02-13 RX ORDER — LIDOCAINE 40 MG/G
CREAM TOPICAL ONCE
OUTPATIENT
Start: 2023-02-13 | End: 2023-02-13

## 2023-02-13 RX ORDER — LIDOCAINE 50 MG/G
OINTMENT TOPICAL ONCE
OUTPATIENT
Start: 2023-02-13 | End: 2023-02-13

## 2023-02-13 RX ORDER — LIDOCAINE HYDROCHLORIDE 40 MG/ML
SOLUTION TOPICAL ONCE
OUTPATIENT
Start: 2023-02-13 | End: 2023-02-13

## 2023-02-13 RX ORDER — LIDOCAINE HYDROCHLORIDE 20 MG/ML
JELLY TOPICAL ONCE
Status: DISCONTINUED | OUTPATIENT
Start: 2023-02-13 | End: 2023-02-15 | Stop reason: HOSPADM

## 2023-02-13 NOTE — PROGRESS NOTES
[address]   Progress Note and Procedure Note      Michael Bowie RECORD NUMBER:  [id number]  AGE: 80 y.o. GENDER: female  : 1930  EPISODE DATE:  2023    Subjective:     Chief Complaint   Patient presents with    Wound Check     Left heel wound      HISTORY of PRESENT ILLNESS HPI     Stephen Cardoso is a 80 y.o. female who presents today for wound/ulcer evaluation.    History of Wound Context: left heel wound follow up/eval and treat    Ulcer Identification:  Ulcer Type: pressure  Contributing Factors: chronic pressure, shear force, and arterial insufficiency    Wound: N/A        PAST MEDICAL HISTORY        Diagnosis Date    CAD (coronary artery disease) 2013    Cardiomyopathy ([address] 2013    EF 25%    CHF (congestive heart failure) ([facility])     Hyperlipemia     Dr. Ley Speaker manages    Palliative care patient 2023    Status post coronary artery stent placement 2013    LAD       PAST SURGICAL HISTORY    Past Surgical History:   Procedure Laterality Date    APPENDECTOMY      BACK SURGERY      CARDIAC CATHETERIZATION  2013   [facility]    EF 20-25%    TONSILLECTOMY         FAMILY HISTORY    Family History   Problem Relation Age of Onset    Cancer Sister        SOCIAL HISTORY    Social History     Tobacco Use    Smoking status: Never    Smokeless tobacco: Never   Vaping Use    Vaping Use: Never used   Substance Use Topics    Alcohol use: Not Currently     Comment: \"Maybe a few drinks years ago but I don't drink anymore\"    Drug use: No       ALLERGIES    Allergies   Allergen Reactions    Codeine      Sick to her stomach    Cortizone      Sick to her stomach    Penicillins      Sick to her stomach    Sulfa Antibiotics      Sick to her stomach       MEDICATIONS    Current Outpatient Medications on File Prior to Encounter   Medication Sig Dispense Refill    ipratropium-albuterol (DUONEB) 0.5-2.5 (3) MG/3ML SOLN nebulizer solution Inhale 3 mLs into the lungs in the morning and 3 mLs at noon and 3 mLs in the evening and 3 mLs before bedtime. 360 mL 0    cyanocobalamin 1000 MCG tablet Take 1 tablet by mouth daily 30 tablet 3    furosemide (LASIX) 40 MG tablet Take 0.5 tablets by mouth daily for 10 days Need reassessment with labs and vitals after 10 days in PCPs office 5 tablet 0    carvedilol (COREG) 3.125 MG tablet TAKE 1 TABLET BY MOUTH 2 TIMES DAILY (WITH MEALS) 60 tablet 0    lisinopril (PRINIVIL;ZESTRIL) 5 MG tablet Take 1 tablet by mouth daily 30 tablet 3    pravastatin (PRAVACHOL) 40 MG tablet Take 40 mg by mouth daily. HYDROcodone-acetaminophen (NORCO) 5-325 MG per tablet Take 1 tablet by mouth every 6 hours as needed for Pain. aspirin 81 MG tablet Take 81 mg by mouth daily. No current facility-administered medications on file prior to encounter. REVIEW OF SYSTEMS    Pertinent items are noted in HPI.     Objective:      /60   Pulse 65   Temp 98.5 °F (36.9 °C) (Temporal)   Resp 20   Ht 5' (1.524 m)   Wt 118 lb (53.5 kg)   BMI 23.05 kg/m²     Wt Readings from Last 3 Encounters:   02/13/23 118 lb (53.5 kg)   02/08/23 118 lb (53.5 kg)   01/10/23 122 lb (55.3 kg)       PHYSICAL EXAM    General Appearance: alert and oriented to person, place and time, well developed and well- nourished, in no acute distress  Skin: warm and dry, no rash or erythema  Head: normocephalic and atraumatic  Eyes: pupils equal, round, and reactive to light, extraocular eye movements intact, conjunctivae normal  ENT: tympanic membrane, external ear and ear canal normal bilaterally, nose without deformity, nasal mucosa and turbinates normal without polyps  Neck: supple and non-tender without mass, no thyromegaly or thyroid nodules, no cervical lymphadenopathy  Pulmonary/Chest: clear to auscultation bilaterally- no wheezes, rales or rhonchi, normal air movement, no respiratory distress  Extremities: no cyanosis, clubbing or edema  Musculoskeletal: normal range of motion, no joint swelling, deformity or tenderness  Neurologic: reflexes normal and symmetric, no cranial nerve deficit, gait, coordination and speech normal      Assessment:      Patient Active Problem List   Diagnosis Code    Ischemic dilated cardiomyopathy ([facility] I25.5, I42.0    Status post coronary artery stent placement Z95.5    Mixed hyperlipidemia E78.2    CAP (community acquired pneumonia) J18.9    Cholelithiasis K80.20    Diverticulosis K57.90    Hyperlipemia E78.5    Chronic systolic congestive heart failure (HCC) I50.22    Acute respiratory failure with hypoxia ([facility]) J96.01    AAA (abdominal aortic aneurysm) I71.40    Descending thoracic aortic aneurysm I71.23    Pneumonia of right lower lobe due to infectious organism J18.9    Calculus of gallbladder without cholecystitis without obstruction K80.20    Coronary artery disease involving native heart without angina pectoris I25.10    Diverticulosis of intestine without bleeding K57.90    Hyperlipidemia E78.5    Unstageable pressure ulcer of left heel ([facility]) L89.620    Unstageable pressure ulcer of right heel ([facility]) L89.610    PAD (peripheral artery disease) ([facility]) I73.9    Medically noncompliant K30.335    Systolic and diastolic CHF, acute on chronic ([facility] I50.43    Palliative care patient Z51.5       Wound 10/19/22 Heel Left wound 1- left heel unstagable (Active)   Wound Image   02/13/23 1306   Wound Etiology Pressure Unstageable 02/13/23 1306   Dressing Status Clean;Dry; Intact 02/13/23 1306   Wound Cleansed Soap and water 02/13/23 1306   Dressing/Treatment Xeroform;Dry dressing 02/13/23 1328   Wound Length (cm) 2 cm 02/13/23 1306   Wound Width (cm) 1.7 cm 02/13/23 1306   Wound Depth (cm) 0.1 cm 02/13/23 1306   Wound Surface Area (cm^2) 3.4 cm^2 02/13/23 1306   Change in Wound Size % (l*w) 83 02/13/23 1306   Wound Volume (cm^3) 0.34 cm^3 02/13/23 1306   Wound Healing % 83 02/13/23 1306   Post-Procedure Length (cm) 2.2 cm 01/10/23 1335   Post-Procedure Width (cm) 1.8 cm 01/10/23 1335   Post-Procedure Depth (cm) 0.2 cm 01/10/23 1335   Post-Procedure Surface Area (cm^2) 3.96 cm^2 01/10/23 1335   Post-Procedure Volume (cm^3) 0.792 cm^3 01/10/23 1335   Wound Assessment Pink/red;Slough 02/13/23 1306   Drainage Amount Moderate 02/13/23 1306   Drainage Description Serosanguinous 02/13/23 1306   Odor None 02/13/23 1306   Nikki-wound Assessment Intact 02/13/23 1306   Margins Attached edges; Defined edges 02/13/23 1306   Wound Thickness Description not for Pressure Injury Full thickness 02/13/23 1306   Number of days: 117           Procedure Note  Indications:  Based on my examination of this patient's wound(s)/ulcer(s) today, debridement is required to promote healing and evaluate the wound base. Performed by: IZA Goodwin CNP    Consent obtained:  Yes    Time out taken:  Yes    Pain Control: Anesthetic  Anesthetic: 2% Lidocaine Gel Topical       Debridement:Non-excisional Debridement    Using curette the wound(s)/ulcer(s) was/were sharply debrided down through and including the removal of epidermis and dermis. Devitalized Tissue Debrided:  fibrin, biofilm, slough, and exudate    Pre Debridement Measurements:  Are located in the Wound/Ulcer Documentation Flow Sheet    Wound/Ulcer #: 1    Post Debridement Measurements:  Wound/Ulcer Descriptions are Pre Debridement except measurements:      Percent of Wound/Ulcer Debrided: 100%    Total Surface Area Debrided:  3.96 sq cm     Estimated Blood Loss:  Minimal    Hemostasis Achieved:  by pressure    Procedural Pain:  0  / 10     Post Procedural Pain:  0 / 10     Response to treatment:  Well tolerated by patient.          Diabetic/Pressure/Non Pressure Ulcers only:  Ulcer: Pressure ulcer, unstageable            Plan:     Problem List Items Addressed This Visit          Circulatory    PAD (peripheral artery disease) ([address] - Primary    Relevant Medications    lidocaine (XYLOCAINE) 2 % uro-jet (Start on 2/13/2023 2:00 PM)    Other Relevant Orders    Initiate Outpatient Wound Care Protocol       Other    * (Principal) Unstageable pressure ulcer of left heel (HCC)    Relevant Medications    lidocaine (XYLOCAINE) 2 % uro-jet (Start on 2/13/2023  2:00 PM)    Other Relevant Orders    Initiate Outpatient Wound Care Protocol    Unstageable pressure ulcer of right heel (HCC)    Relevant Medications    lidocaine (XYLOCAINE) 2 % uro-jet (Start on 2/13/2023  2:00 PM)    Other Relevant Orders    Initiate Outpatient Wound Care Protocol       Treatment Note please see attached Discharge Instructions    In my professional opinion this patient would benefit from HBO Therapy: No    Written patient dismissal instructions given to patient and signed by patient or POA. Ms. Susan Camarena is healing well, follow up in 1 month.   Electronically signed by IZA Marion CNP on 2/13/2023 at 1:39 PM

## 2023-02-13 NOTE — DISCHARGE INSTRUCTIONS
[address] [facility]   Physician Orders and Discharge Instructions  [address]  Telephone: [phone]    NAME:  Rodney Patel  YOB: 1930  MEDICAL RECORD NUMBER:  [id number]  DATE:  2/13/2023    Discharge condition: Stable    Discharge to: Home    Left via:Private automobile    Accompanied by:  friend    [facility]: [facility]    Dressing Orders:   Left heel wound: Soap and water wash. Apply a double layer of Xeroform (the yellow sticky dressing) over the wound bed daily. Secure with dry gauze and tape daily. Treatment Orders:   Avoid Pressure to wound site. Off-load heels by applying heel-lift boots or \"float\" heels by placing pillows under calves so that heels do not touch mattress. Continue Protein rich diet (unless restricted by your physician)   Take Multivitamin daily     Please use Roho cushion in chair   Please use low air loss bed    [address] [place] [address],[address] follow up visit ___1 month with Shanon_________________________  (Please note your next appointment above and if you are unable to keep, kindly give a 24 hour notice. Thank you.)          If you experience any of the following, please call the [facility] during business hours:    * Increase in Pain  * Temperature over 101  * Increase in drainage from your wound  * Drainage with a foul odor  * Bleeding  * Increase in swelling  * Need for compression bandage changes due to slippage, breakthrough drainage. If you need medical attention outside of the business hours of the [facility] please contact your PCP or go to the nearest emergency room.

## 2023-03-09 NOTE — DISCHARGE INSTRUCTIONS
[address] [facility]   Physician Orders and Discharge Instructions  [address]  Telephone: [phone]    NAME:  Mariano Green  YOB: 1930  MEDICAL RECORD NUMBER:  [id number]  DATE:  3/9/2023    Discharge condition: Stable    Discharge to: Home    Left via:Private automobile    Accompanied by:  friend    ECF/H[facility]: [facility]    Dressing Orders:    Left heel wound: Soap and water wash. Apply a double layer of Xeroform (the yellow sticky dressing) over the wound bed daily. Secure with dry gauze and tape daily. Treatment Orders:    Avoid Pressure to wound site. Off-load heels by applying heel-lift boots or \"float\" heels by placing pillows under calves so that heels do not touch mattress. Continue Protein rich diet (unless restricted by your physician)   Take Multivitamin daily     Please use Roho cushion in chair   Please use low air loss bed    [address] [place] [address],[address] follow up visit ______4 weeks with Shanon_______________________  (Please note your next appointment above and if you are unable to keep, kindly give a 24 hour notice. Thank you.)          If you experience any of the following, please call the [facility] during business hours:    * Increase in Pain  * Temperature over 101  * Increase in drainage from your wound  * Drainage with a foul odor  * Bleeding  * Increase in swelling  * Need for compression bandage changes due to slippage, breakthrough drainage. If you need medical attention outside of the business hours of the [facility] please contact your PCP or go to the nearest emergency room.

## 2023-03-15 ENCOUNTER — HOSPITAL ENCOUNTER (OUTPATIENT)
Dept: WOUND CARE | Age: 88
Discharge: HOME | End: 2023-03-15
Payer: MEDICARE

## 2023-03-15 VITALS
RESPIRATION RATE: 20 BRPM | SYSTOLIC BLOOD PRESSURE: 120 MMHG | HEART RATE: 65 BPM | WEIGHT: 118 LBS | DIASTOLIC BLOOD PRESSURE: 60 MMHG | HEIGHT: 60 IN | TEMPERATURE: 98.5 F | BODY MASS INDEX: 23.16 KG/M2

## 2023-03-15 DIAGNOSIS — L89.623 DECUBITUS ULCER OF LEFT HEEL, STAGE 3 (HCC): ICD-10-CM

## 2023-03-15 DIAGNOSIS — L89.610 UNSTAGEABLE PRESSURE ULCER OF RIGHT HEEL (HCC): ICD-10-CM

## 2023-03-15 DIAGNOSIS — I73.9 PAD (PERIPHERAL ARTERY DISEASE) (HCC): Primary | ICD-10-CM

## 2023-03-15 PROCEDURE — 97597 DBRDMT OPN WND 1ST 20 CM/<: CPT

## 2023-03-15 PROCEDURE — 11042 DBRDMT SUBQ TIS 1ST 20SQCM/<: CPT

## 2023-03-15 PROCEDURE — 11042 DBRDMT SUBQ TIS 1ST 20SQCM/<: CPT | Performed by: NURSE PRACTITIONER

## 2023-03-15 RX ORDER — LIDOCAINE HYDROCHLORIDE 20 MG/ML
JELLY TOPICAL ONCE
OUTPATIENT
Start: 2023-03-15 | End: 2023-03-15

## 2023-03-15 RX ORDER — LIDOCAINE 50 MG/G
OINTMENT TOPICAL ONCE
OUTPATIENT
Start: 2023-03-15 | End: 2023-03-15

## 2023-03-15 RX ORDER — LIDOCAINE 40 MG/G
CREAM TOPICAL ONCE
OUTPATIENT
Start: 2023-03-15 | End: 2023-03-15

## 2023-03-15 RX ORDER — LIDOCAINE HYDROCHLORIDE 20 MG/ML
JELLY TOPICAL ONCE
Status: DISCONTINUED | OUTPATIENT
Start: 2023-03-15 | End: 2023-03-17 | Stop reason: HOSPADM

## 2023-03-15 RX ORDER — LIDOCAINE HYDROCHLORIDE 40 MG/ML
SOLUTION TOPICAL ONCE
OUTPATIENT
Start: 2023-03-15 | End: 2023-03-15

## 2023-03-15 NOTE — PLAN OF CARE
Problem: Chronic Conditions and Co-morbidities  Goal: Patient's chronic conditions and co-morbidity symptoms are monitored and maintained or improved  Outcome: Progressing     Problem: Discharge Planning  Goal: Discharge to home or other facility with appropriate resources  Outcome: Progressing     Problem: Wound:  Goal: Will show signs of wound healing; wound closure and no evidence of infection  Description: Will show signs of wound healing; wound closure and no evidence of infection  Outcome: Progressing     Problem: Pressure Ulcer:  Goal: Signs of wound healing will improve  Description: Signs of wound healing will improve  Outcome: Progressing  Goal: Absence of new pressure ulcer  Description: Absence of new pressure ulcer  Outcome: Progressing  Goal: Will show no infection signs and symptoms  Description: Will show no infection signs and symptoms  Outcome: Progressing     Problem: Arterial:  Goal: Optimize blood flow for wound healing  Description: Optimize blood flow for wound healing  Outcome: Progressing

## 2023-03-15 NOTE — PROGRESS NOTES
[address]   Progress Note and Procedure Note      Michael Bowie RECORD NUMBER:  [id number]  AGE: 80 y.o. GENDER: female  : 1930  EPISODE DATE:  3/15/2023    Subjective:     Chief Complaint   Patient presents with    Wound Check     Left heel wound      HISTORY of PRESENT ILLNESS HPI     Stephanie Farfan is a 80 y.o. female who presents today for wound/ulcer evaluation.    History of Wound Context: left heel wound follow up/eval and treat    Ulcer Identification:  Ulcer Type: pressure  Contributing Factors: chronic pressure, shear force, and arterial insufficiency    Wound: N/A        PAST MEDICAL HISTORY        Diagnosis Date    CAD (coronary artery disease) 2013    Cardiomyopathy ([address] 2013    EF 25%    CHF (congestive heart failure) ([facility])     Hyperlipemia     Dr. Elizabeth Campbell manages    Palliative care patient 2023    Status post coronary artery stent placement 2013    LAD       PAST SURGICAL HISTORY    Past Surgical History:   Procedure Laterality Date    APPENDECTOMY      BACK SURGERY      CARDIAC CATHETERIZATION  2013   [facility]    EF 20-25%    TONSILLECTOMY         FAMILY HISTORY    Family History   Problem Relation Age of Onset    Cancer Sister        SOCIAL HISTORY    Social History     Tobacco Use    Smoking status: Never    Smokeless tobacco: Never   Vaping Use    Vaping Use: Never used   Substance Use Topics    Alcohol use: Not Currently     Comment: \"Maybe a few drinks years ago but I don't drink anymore\"    Drug use: No       ALLERGIES    Allergies   Allergen Reactions    Codeine      Sick to her stomach    Cortizone      Sick to her stomach    Penicillins      Sick to her stomach    Sulfa Antibiotics      Sick to her stomach       MEDICATIONS    Current Outpatient Medications on File Prior to Encounter   Medication Sig Dispense Refill    ipratropium-albuterol (DUONEB) 0.5-2.5 (3) MG/3ML SOLN nebulizer solution Inhale 3 mLs into the lungs in the morning and 3 mLs at noon and 3 mLs in the evening and 3 mLs before bedtime. 360 mL 0    cyanocobalamin 1000 MCG tablet Take 1 tablet by mouth daily 30 tablet 3    carvedilol (COREG) 3.125 MG tablet TAKE 1 TABLET BY MOUTH 2 TIMES DAILY (WITH MEALS) 60 tablet 0    lisinopril (PRINIVIL;ZESTRIL) 5 MG tablet Take 1 tablet by mouth daily 30 tablet 3    pravastatin (PRAVACHOL) 40 MG tablet Take 40 mg by mouth daily. HYDROcodone-acetaminophen (NORCO) 5-325 MG per tablet Take 1 tablet by mouth every 6 hours as needed for Pain. aspirin 81 MG tablet Take 81 mg by mouth daily. No current facility-administered medications on file prior to encounter. REVIEW OF SYSTEMS    Pertinent items are noted in HPI.     Objective:      /60   Pulse 65   Temp 98.5 °F (36.9 °C) (Temporal)   Resp 20   Ht 5' (1.524 m)   Wt 118 lb (53.5 kg)   BMI 23.05 kg/m²     Wt Readings from Last 3 Encounters:   03/15/23 118 lb (53.5 kg)   02/13/23 118 lb (53.5 kg)   02/08/23 118 lb (53.5 kg)       PHYSICAL EXAM    General Appearance: alert and oriented to person, place and time, well developed and well- nourished, in no acute distress  Skin: warm and dry, no rash or erythema  Head: normocephalic and atraumatic  Eyes: pupils equal, round, and reactive to light, extraocular eye movements intact, conjunctivae normal  ENT: tympanic membrane, external ear and ear canal normal bilaterally, nose without deformity, nasal mucosa and turbinates normal without polyps  Neck: supple and non-tender without mass, no thyromegaly or thyroid nodules, no cervical lymphadenopathy  Pulmonary/Chest: clear to auscultation bilaterally- no wheezes, rales or rhonchi, normal air movement, no respiratory distress  Extremities: no cyanosis, clubbing or edema  Musculoskeletal: normal range of motion, no joint swelling, deformity or tenderness  Neurologic: reflexes normal and symmetric, no cranial nerve deficit, gait, coordination and speech normal      Assessment:      Patient Active Problem List   Diagnosis Code    Ischemic dilated cardiomyopathy ([facility] I25.5, I42.0    Status post coronary artery stent placement Z95.5    Mixed hyperlipidemia E78.2    CAP (community acquired pneumonia) J18.9    Cholelithiasis K80.20    Diverticulosis K57.90    Hyperlipemia E78.5    Chronic systolic congestive heart failure ([facility] I50.22    Acute respiratory failure with hypoxia ([facility]) J96.01    AAA (abdominal aortic aneurysm) I71.40    Descending thoracic aortic aneurysm I71.23    Pneumonia of right lower lobe due to infectious organism J18.9    Calculus of gallbladder without cholecystitis without obstruction K80.20    Coronary artery disease involving native heart without angina pectoris I25.10    Diverticulosis of intestine without bleeding K57.90    Hyperlipidemia E78.5    Decubitus ulcer of left heel, stage 3 ([facility]) L89.623    Unstageable pressure ulcer of right heel ([facility]) L89.610    PAD (peripheral artery disease) ([facility]) I73.9    Medically noncompliant O56.174    Systolic and diastolic CHF, acute on chronic ([facility] I50.43    Palliative care patient Z51.5       Wound 10/19/22 Heel Left wound 1- left heel unstagable (Active)   Wound Image   03/15/23 1402   Wound Etiology Pressure Stage 3 03/15/23 1402   Dressing Status Old drainage noted 03/15/23 1402   Wound Cleansed Soap and water 03/15/23 1402   Dressing/Treatment Xeroform;Dry dressing 03/15/23 1432   Offloading for Diabetic Foot Ulcers Offloading ordered 03/15/23 1432   Wound Length (cm) 1.2 cm 03/15/23 1402   Wound Width (cm) 1.4 cm 03/15/23 1402   Wound Depth (cm) 0.2 cm 03/15/23 1402   Wound Surface Area (cm^2) 1.68 cm^2 03/15/23 1402   Change in Wound Size % (l*w) 91.6 03/15/23 1402   Wound Volume (cm^3) 0.336 cm^3 03/15/23 1402   Wound Healing % 83 03/15/23 1402   Post-Procedure Length (cm) 1.2 cm 03/15/23 1432   Post-Procedure Width (cm) 1.4 cm 03/15/23 1432   Post-Procedure Depth (cm) 0.2 cm 03/15/23 1432 Post-Procedure Surface Area (cm^2) 1.68 cm^2 03/15/23 1432   Post-Procedure Volume (cm^3) 0.336 cm^3 03/15/23 1432   Undermining Starts ___ O'Clock 4 03/15/23 1402   Undermining Ends___ O'Clock 7 03/15/23 1402   Undermining Maxium Distance (cm) .3 03/15/23 1402   Wound Assessment Pink/red;Slough 03/15/23 1402   Drainage Amount Moderate 03/15/23 1402   Drainage Description Serosanguinous 03/15/23 1402   Odor None 03/15/23 1402   Nikki-wound Assessment Intact 03/15/23 1402   Margins Attached edges; Defined edges 03/15/23 1402   Wound Thickness Description not for Pressure Injury Full thickness 03/15/23 1402   Number of days: 147           Procedure Note  Indications:  Based on my examination of this patient's wound(s)/ulcer(s) today, debridement is required to promote healing and evaluate the wound base. Performed by: IZA Reyna CNP    Consent obtained:  Yes    Time out taken:  Yes    Pain Control: Anesthetic  Anesthetic: 2% Lidocaine Gel Topical       Debridement:Excisional Debridement    Using curette and forceps the wound(s)/ulcer(s) was/were sharply debrided down through and including the removal of epidermis and dermis and subcutaneous. Devitalized Tissue Debrided:  fibrin, biofilm, slough, exudate, and callus    Pre Debridement Measurements:  Are located in the Wound/Ulcer Documentation Flow Sheet    Wound/Ulcer #: 1    Post Debridement Measurements:  Wound/Ulcer Descriptions are Pre Debridement except measurements:      Percent of Wound/Ulcer Debrided: 100%    Total Surface Area Debrided:  1.68 sq cm     Estimated Blood Loss:  Minimal    Hemostasis Achieved:  by pressure    Procedural Pain:  6  / 10     Post Procedural Pain:  0 / 10     Response to treatment:  Well tolerated by patient., With complaints of pain.          Diabetic/Pressure/Non Pressure Ulcers only:  Ulcer: Pressure ulcer, Stage 3            Plan:     Problem List Items Addressed This Visit          Circulatory    PAD (peripheral artery disease) ([facility] - Primary    Relevant Medications    lidocaine (XYLOCAINE) 2 % uro-jet (Start on 3/15/2023  4:30 PM)    Other Relevant Orders    Initiate Outpatient Wound Care Protocol       Other    * (Principal) Decubitus ulcer of left heel, stage 3 (HCC)    Relevant Medications    lidocaine (XYLOCAINE) 2 % uro-jet (Start on 3/15/2023  4:30 PM)    Other Relevant Orders    Initiate Outpatient Wound Care Protocol    Unstageable pressure ulcer of right heel (HCC)    Relevant Medications    lidocaine (XYLOCAINE) 2 % uro-jet (Start on 3/15/2023  4:30 PM)    Other Relevant Orders    Initiate Outpatient Wound Care Protocol       Treatment Note please see attached Discharge Instructions    In my professional opinion this patient would benefit from HBO Therapy: No    Written patient dismissal instructions given to patient and signed by patient or POA. Ms. Helen Borjas is healing despite her walking non-stop and PAD. I am going to continue plan and follow up in 4 weeks (patients caregiver schedule acclimates better).   Electronically signed by IZA Ingram CNP on 3/15/2023 at 4:06 PM

## 2023-03-15 NOTE — HOME CARE
[address]:     [facility] [address] [facility], [address]  p: [phone] f: [phone]     Ordering Center:     [address]  [address], [address] [address] [phone]  WOUND CARE Dept: [address] [facility] [phone]    Patient Information:      Crista Walden   [phone]   : 1930  AGE: 80 y.o. GENDER: female   EPISODE DATE: 3/15/2023    Insurance:      PRIMARY INSURANCE:  Plan: [employer] PLUS HMO  Coverage: HUMANA MEDICARE  Effective Date: 2023  Group Number: [unfilled]  Subscriber Number: [id number] - (Medicare Managed)    Payer/Plan Subscr  Sex Relation Sub. Ins. ID Effective Group Num   1.  Ernestine 1930 Female Self [id number]                                   [address]       Patient Wound Information:      Problem List Items Addressed This Visit          Circulatory    PAD (peripheral artery disease) ([facility] - Primary    Relevant Medications    lidocaine (XYLOCAINE) 2 % uro-jet (Start on 3/15/2023  4:30 PM)    Other Relevant Orders    Initiate Outpatient Wound Care Protocol       Other    * (Principal) Decubitus ulcer of left heel, stage 3 (HCC)    Relevant Medications    lidocaine (XYLOCAINE) 2 % uro-jet (Start on 3/15/2023  4:30 PM)    Other Relevant Orders    Initiate Outpatient Wound Care Protocol    Unstageable pressure ulcer of right heel (HCC)    Relevant Medications    lidocaine (XYLOCAINE) 2 % uro-jet (Start on 3/15/2023  4:30 PM)    Other Relevant Orders    Initiate Outpatient Wound Care Protocol       WOUNDS REQUIRING DRESSING SUPPLIES:     Wound 10/19/22 Heel Left wound 1- left heel unstagable (Active)   Wound Image   03/15/23 1402   Wound Etiology Pressure Stage 3 03/15/23 1402   Dressing Status Old drainage noted 03/15/23 1402   Wound Cleansed Soap and water 03/15/23 1402 Dressing/Treatment Xeroform;Dry dressing 03/15/23 1432   Offloading for Diabetic Foot Ulcers Offloading ordered 03/15/23 1432   Wound Length (cm) 1.2 cm 03/15/23 1402   Wound Width (cm) 1.4 cm 03/15/23 1402   Wound Depth (cm) 0.2 cm 03/15/23 1402   Wound Surface Area (cm^2) 1.68 cm^2 03/15/23 1402   Change in Wound Size % (l*w) 91.6 03/15/23 1402   Wound Volume (cm^3) 0.336 cm^3 03/15/23 1402   Wound Healing % 83 03/15/23 1402   Post-Procedure Length (cm) 1.2 cm 03/15/23 1432   Post-Procedure Width (cm) 1.4 cm 03/15/23 1432   Post-Procedure Depth (cm) 0.2 cm 03/15/23 1432   Post-Procedure Surface Area (cm^2) 1.68 cm^2 03/15/23 1432   Post-Procedure Volume (cm^3) 0.336 cm^3 03/15/23 1432   Undermining Starts ___ O'Clock 4 03/15/23 1402   Undermining Ends___ O'Clock 7 03/15/23 1402   Undermining Maxium Distance (cm) .3 03/15/23 1402   Wound Assessment Pink/red;Slough 03/15/23 1402   Drainage Amount Moderate 03/15/23 1402   Drainage Description Serosanguinous 03/15/23 1402   Odor None 03/15/23 1402   Nikki-wound Assessment Intact 03/15/23 1402   Margins Attached edges; Defined edges 03/15/23 1402   Wound Thickness Description not for Pressure Injury Full thickness 03/15/23 1402   Number of days: 147          Supplies Requested :      WOUND #: 1   PRIMARY DRESSING:  Other: xeroform   Cover and Secure with: 2X2 gauze pad  Conforming roll gauze     FREQUENCY OF DRESSING CHANGES:  Daily         ADDITIONAL ITEMS:  [] Gloves Small  [x] Gloves Medium [] Gloves Large [] Gloves XLarge  [] Tape 1\" [] Tape 2\" [x] Tape 3\"  [x] Medipore Tape  [x] Saline  [] Vashe  [] Skin Prep   [] Adhesive Remover   [] Cotton Tip Applicators   [] Other:    Patient Wound(s) Debrided: [x] Yes if yes please add date 3/15/23 [] No    Debribement Type: Excisional/Sharp and Mechanical     Is the patient currently on an antibiotic for their Wound(s): [] Yes if yes please add name and dose    [x] No    Patient currently being seen by Home Health: [] Yes [x] No    Duration for needed supplies:  []15  [x]30  []60  []90 Days    Electronically signed by IZA Hartmann CNP on 3/15/2023 at 4:09 PM     Provider Information:      PROVIDER'S NAME: Shyanne COUCH    NPI: [id number]

## 2023-05-17 ENCOUNTER — HOSPITAL ENCOUNTER (OUTPATIENT)
Dept: WOUND CARE | Age: 88
Discharge: HOME | End: 2023-05-17
Payer: MEDICARE

## 2023-05-17 VITALS
HEART RATE: 65 BPM | WEIGHT: 118 LBS | RESPIRATION RATE: 20 BRPM | SYSTOLIC BLOOD PRESSURE: 117 MMHG | TEMPERATURE: 97 F | BODY MASS INDEX: 23.16 KG/M2 | HEIGHT: 60 IN | DIASTOLIC BLOOD PRESSURE: 64 MMHG

## 2023-05-17 DIAGNOSIS — L89.610 UNSTAGEABLE PRESSURE ULCER OF RIGHT HEEL (HCC): ICD-10-CM

## 2023-05-17 DIAGNOSIS — I73.9 PAD (PERIPHERAL ARTERY DISEASE) (HCC): Primary | ICD-10-CM

## 2023-05-17 DIAGNOSIS — L89.623 DECUBITUS ULCER OF LEFT HEEL, STAGE 3 (HCC): ICD-10-CM

## 2023-05-17 PROCEDURE — 97597 DBRDMT OPN WND 1ST 20 CM/<: CPT

## 2023-05-17 PROCEDURE — 97597 DBRDMT OPN WND 1ST 20 CM/<: CPT | Performed by: NURSE PRACTITIONER

## 2023-05-17 NOTE — PROGRESS NOTES
[address]   Progress Note and Procedure Note      Michael Bowie RECORD NUMBER:  [id number]  AGE: 80 y.o. GENDER: female  : 1930  EPISODE DATE:  2023    Subjective:     Chief Complaint   Patient presents with    Wound Check     Left heel wound      HISTORY of PRESENT ILLNESS HPI     Leda Gaines is a 80 y.o. female who presents today for wound/ulcer evaluation.    History of Wound Context: left heel wound follow up/eval and treat    Ulcer Identification:  Ulcer Type: pressure  Contributing Factors: edema, chronic pressure, shear force, and arterial insufficiency    Wound: N/A        PAST MEDICAL HISTORY        Diagnosis Date    CAD (coronary artery disease) 2013    Cardiomyopathy ([address] 2013    EF 25%    CHF (congestive heart failure) ([facility])     Hyperlipemia     Dr. Ameena Dexter manages    Palliative care patient 2023    Status post coronary artery stent placement 2013    LAD       PAST SURGICAL HISTORY    Past Surgical History:   Procedure Laterality Date    APPENDECTOMY      BACK SURGERY      CARDIAC CATHETERIZATION  2013   [facility]    EF 20-25%    TONSILLECTOMY         FAMILY HISTORY    Family History   Problem Relation Age of Onset    Cancer Sister        SOCIAL HISTORY    Social History     Tobacco Use    Smoking status: Never    Smokeless tobacco: Never   Vaping Use    Vaping Use: Never used   Substance Use Topics    Alcohol use: Not Currently     Comment: \"Maybe a few drinks years ago but I don't drink anymore\"    Drug use: No       ALLERGIES    Allergies   Allergen Reactions    Codeine      Sick to her stomach    Cortizone      Sick to her stomach    Penicillins      Sick to her stomach    Sulfa Antibiotics      Sick to her stomach       MEDICATIONS    Current Outpatient Medications on File Prior to Encounter   Medication Sig Dispense Refill    ipratropium-albuterol (DUONEB) 0.5-2.5 (3) MG/3ML SOLN nebulizer solution Inhale 3 mLs into

## 2023-05-17 NOTE — PLAN OF CARE
Problem: Chronic Conditions and Co-morbidities  Goal: Patient's chronic conditions and co-morbidity symptoms are monitored and maintained or improved  Outcome: Completed     Problem: Discharge Planning  Goal: Discharge to home or other facility with appropriate resources  Outcome: Completed     Problem: Wound:  Goal: Will show signs of wound healing; wound closure and no evidence of infection  Description: Will show signs of wound healing; wound closure and no evidence of infection  Outcome: Completed     Problem: Pressure Ulcer:  Goal: Signs of wound healing will improve  Description: Signs of wound healing will improve  Outcome: Completed  Goal: Absence of new pressure ulcer  Description: Absence of new pressure ulcer  Outcome: Completed  Goal: Will show no infection signs and symptoms  Description: Will show no infection signs and symptoms  Outcome: Completed     Problem: Arterial:  Goal: Optimize blood flow for wound healing  Description: Optimize blood flow for wound healing  Outcome: Completed       Follow up as needed.

## 2023-05-17 NOTE — HOME CARE
[address]:     [facility] [address] [facility], [address] [place] [address] Road  p: [phone] f: [phone]     Ordering Center:     [address]  [address], [address] [address] [phone]  WOUND CARE Dept: [address] [facility] [phone]    Patient Information:      Emily Walden   [phone]   : 1930  AGE: 80 y.o. GENDER: female   EPISODE DATE: 2023    Insurance:      PRIMARY INSURANCE:  Plan: Rashida Bruch PLUS HMO  Coverage: HUMANA MEDICARE  Effective Date: 2023  Group Number: [unfilled]  Subscriber Number: [id number] - (Medicare Managed)    Payer/Plan Subscr  Sex Relation Sub. Ins. ID Effective Group Num   1.  [employer]NA MEDICA* [id number] 1930 Female Self [id number]                                   [address] [address]       Patient Wound Information:      Problem List Items Addressed This Visit          Circulatory    PAD (peripheral artery disease) ([address] - Primary       Other    * (Principal) Decubitus ulcer of left heel, stage 3 (HCC)    Unstageable pressure ulcer of right heel ([address]       WOUNDS REQUIRING DRESSING SUPPLIES:     Wound 10/19/22 Heel Left wound 1- left heel unstagable (Active)   Wound Image   23 0759   Wound Etiology Pressure Stage 3 23 0759   Dressing Status Old drainage noted 23 0759   Wound Cleansed Soap and water 23 0759   Dressing/Treatment Xeroform;Dry dressing 23 0819   Offloading for Diabetic Foot Ulcers Offloading ordered 23 0819   Wound Length (cm) 0.5 cm 23 0759   Wound Width (cm) 0.2 cm 23 0759   Wound Depth (cm) 0.2 cm 23 0759   Wound Surface Area (cm^2) 0.1 cm^2 23 0759   Change in Wound Size % (l*w) 99.5 23 0759   Wound Volume (cm^3) 0.02 cm^3 23 0759   Wound Healing % 99 23 0759   Post-Procedure Length (cm) 0.5 cm 23

## 2024-06-24 ENCOUNTER — APPOINTMENT (OUTPATIENT)
Dept: VASCULAR LAB | Age: 89
DRG: 481 | End: 2024-06-24
Payer: MEDICARE

## 2024-06-24 ENCOUNTER — APPOINTMENT (OUTPATIENT)
Dept: GENERAL RADIOLOGY | Age: 89
DRG: 481 | End: 2024-06-24
Payer: MEDICARE

## 2024-06-24 ENCOUNTER — HOSPITAL ENCOUNTER (INPATIENT)
Age: 89
LOS: 8 days | Discharge: NURSING, REHAB OR LONG-TERM CARE | DRG: 481 | End: 2024-07-02
Attending: EMERGENCY MEDICINE | Admitting: INTERNAL MEDICINE
Payer: MEDICARE

## 2024-06-24 DIAGNOSIS — I35.0 NONRHEUMATIC AORTIC VALVE STENOSIS: ICD-10-CM

## 2024-06-24 DIAGNOSIS — R79.89 ELEVATED TROPONIN: ICD-10-CM

## 2024-06-24 DIAGNOSIS — S81.801A WOUND OF RIGHT LOWER EXTREMITY, INITIAL ENCOUNTER: ICD-10-CM

## 2024-06-24 DIAGNOSIS — M79.661 RIGHT CALF PAIN: ICD-10-CM

## 2024-06-24 DIAGNOSIS — R07.89 CHEST WALL PAIN: ICD-10-CM

## 2024-06-24 DIAGNOSIS — S72.141A DISPLACED INTERTROCHANTERIC FRACTURE OF RIGHT FEMUR, INITIAL ENCOUNTER FOR CLOSED FRACTURE (HCC): Primary | ICD-10-CM

## 2024-06-24 LAB
ABO/RH: NORMAL
ALBUMIN SERPL-MCNC: 3.3 G/DL (ref 3.5–5.2)
ALP SERPL-CCNC: 91 U/L (ref 35–104)
ALT SERPL-CCNC: 9 U/L (ref 5–33)
ANION GAP SERPL CALCULATED.3IONS-SCNC: 9 MMOL/L (ref 7–19)
ANTIBODY SCREEN: NORMAL
APTT PPP: 29 SEC (ref 26–36.2)
AST SERPL-CCNC: 15 U/L (ref 5–32)
BACTERIA URNS QL MICRO: NEGATIVE /HPF
BASOPHILS # BLD: 0.1 K/UL (ref 0–0.2)
BASOPHILS NFR BLD: 0.5 % (ref 0–1)
BILIRUB SERPL-MCNC: 0.9 MG/DL (ref 0.2–1.2)
BILIRUB UR QL STRIP: NEGATIVE
BNP BLD-MCNC: ABNORMAL PG/ML (ref 0–449)
BUN SERPL-MCNC: 16 MG/DL (ref 8–23)
CALCIUM SERPL-MCNC: 9 MG/DL (ref 8.2–9.6)
CHLORIDE SERPL-SCNC: 98 MMOL/L (ref 98–111)
CLARITY UR: CLEAR
CO2 SERPL-SCNC: 30 MMOL/L (ref 22–29)
COLOR UR: YELLOW
CREAT SERPL-MCNC: 0.8 MG/DL (ref 0.5–0.9)
CRP SERPL HS-MCNC: 5.62 MG/DL (ref 0–0.5)
CRYSTALS URNS MICRO: ABNORMAL /HPF
EKG P AXIS: 65 DEGREES
EKG P-R INTERVAL: 194 MS
EKG Q-T INTERVAL: 424 MS
EKG QRS DURATION: 124 MS
EKG QTC CALCULATION (BAZETT): 450 MS
EKG T AXIS: 121 DEGREES
EOSINOPHIL # BLD: 0.1 K/UL (ref 0–0.6)
EOSINOPHIL NFR BLD: 1.3 % (ref 0–5)
EPI CELLS #/AREA URNS AUTO: 2 /HPF (ref 0–5)
ERYTHROCYTE [DISTWIDTH] IN BLOOD BY AUTOMATED COUNT: 13.5 % (ref 11.5–14.5)
ERYTHROCYTE [SEDIMENTATION RATE] IN BLOOD BY WESTERGREN METHOD: 28 MM/HR (ref 0–25)
GLUCOSE SERPL-MCNC: 93 MG/DL (ref 74–109)
GLUCOSE UR STRIP.AUTO-MCNC: NEGATIVE MG/DL
HCT VFR BLD AUTO: 36.5 % (ref 37–47)
HGB BLD-MCNC: 11.8 G/DL (ref 12–16)
HGB UR STRIP.AUTO-MCNC: ABNORMAL MG/L
HYALINE CASTS #/AREA URNS AUTO: 3 /HPF (ref 0–8)
IMM GRANULOCYTES # BLD: 0.1 K/UL
INR PPP: 1.01 (ref 0.88–1.18)
KETONES UR STRIP.AUTO-MCNC: ABNORMAL MG/DL
LACTATE BLDV-SCNC: 1.4 MMOL/L (ref 0.5–1.9)
LEUKOCYTE ESTERASE UR QL STRIP.AUTO: NEGATIVE
LYMPHOCYTES # BLD: 2.2 K/UL (ref 1.1–4.5)
LYMPHOCYTES NFR BLD: 20.9 % (ref 20–40)
MCH RBC QN AUTO: 33 PG (ref 27–31)
MCHC RBC AUTO-ENTMCNC: 32.3 G/DL (ref 33–37)
MCV RBC AUTO: 102 FL (ref 81–99)
MONOCYTES # BLD: 0.9 K/UL (ref 0–0.9)
MONOCYTES NFR BLD: 8.4 % (ref 0–10)
NEUTROPHILS # BLD: 7.4 K/UL (ref 1.5–7.5)
NEUTS SEG NFR BLD: 68.3 % (ref 50–65)
NITRITE UR QL STRIP.AUTO: NEGATIVE
PH UR STRIP.AUTO: 6 [PH] (ref 5–8)
PLATELET # BLD AUTO: 259 K/UL (ref 130–400)
PMV BLD AUTO: 8.7 FL (ref 9.4–12.3)
POTASSIUM SERPL-SCNC: 4.5 MMOL/L (ref 3.5–5)
PROT SERPL-MCNC: 6 G/DL (ref 6.6–8.7)
PROT UR STRIP.AUTO-MCNC: NEGATIVE MG/DL
PROTHROMBIN TIME: 13 SEC (ref 12–14.6)
RBC # BLD AUTO: 3.58 M/UL (ref 4.2–5.4)
RBC #/AREA URNS AUTO: 14 /HPF (ref 0–4)
SODIUM SERPL-SCNC: 137 MMOL/L (ref 136–145)
SP GR UR STRIP.AUTO: 1.02 (ref 1–1.03)
TROPONIN, HIGH SENSITIVITY: 75 NG/L (ref 0–14)
TROPONIN, HIGH SENSITIVITY: 78 NG/L (ref 0–14)
TROPONIN, HIGH SENSITIVITY: 84 NG/L (ref 0–14)
UROBILINOGEN UR STRIP.AUTO-MCNC: 1 E.U./DL
WBC # BLD AUTO: 10.7 K/UL (ref 4.8–10.8)
WBC #/AREA URNS AUTO: 2 /HPF (ref 0–5)

## 2024-06-24 PROCEDURE — 94760 N-INVAS EAR/PLS OXIMETRY 1: CPT

## 2024-06-24 PROCEDURE — 85730 THROMBOPLASTIN TIME PARTIAL: CPT

## 2024-06-24 PROCEDURE — 84484 ASSAY OF TROPONIN QUANT: CPT

## 2024-06-24 PROCEDURE — 93005 ELECTROCARDIOGRAM TRACING: CPT | Performed by: EMERGENCY MEDICINE

## 2024-06-24 PROCEDURE — 73552 X-RAY EXAM OF FEMUR 2/>: CPT

## 2024-06-24 PROCEDURE — 85652 RBC SED RATE AUTOMATED: CPT

## 2024-06-24 PROCEDURE — 6360000002 HC RX W HCPCS

## 2024-06-24 PROCEDURE — 73620 X-RAY EXAM OF FOOT: CPT

## 2024-06-24 PROCEDURE — 6370000000 HC RX 637 (ALT 250 FOR IP): Performed by: INTERNAL MEDICINE

## 2024-06-24 PROCEDURE — 72170 X-RAY EXAM OF PELVIS: CPT

## 2024-06-24 PROCEDURE — 87040 BLOOD CULTURE FOR BACTERIA: CPT

## 2024-06-24 PROCEDURE — 99285 EMERGENCY DEPT VISIT HI MDM: CPT

## 2024-06-24 PROCEDURE — 93010 ELECTROCARDIOGRAM REPORT: CPT | Performed by: INTERNAL MEDICINE

## 2024-06-24 PROCEDURE — 86901 BLOOD TYPING SEROLOGIC RH(D): CPT

## 2024-06-24 PROCEDURE — 73630 X-RAY EXAM OF FOOT: CPT

## 2024-06-24 PROCEDURE — 86900 BLOOD TYPING SEROLOGIC ABO: CPT

## 2024-06-24 PROCEDURE — 6360000002 HC RX W HCPCS: Performed by: INTERNAL MEDICINE

## 2024-06-24 PROCEDURE — 1200000000 HC SEMI PRIVATE

## 2024-06-24 PROCEDURE — 71045 X-RAY EXAM CHEST 1 VIEW: CPT

## 2024-06-24 PROCEDURE — 73590 X-RAY EXAM OF LOWER LEG: CPT

## 2024-06-24 PROCEDURE — 83880 ASSAY OF NATRIURETIC PEPTIDE: CPT

## 2024-06-24 PROCEDURE — 99222 1ST HOSP IP/OBS MODERATE 55: CPT | Performed by: INTERNAL MEDICINE

## 2024-06-24 PROCEDURE — 80053 COMPREHEN METABOLIC PANEL: CPT

## 2024-06-24 PROCEDURE — 2580000003 HC RX 258

## 2024-06-24 PROCEDURE — 85610 PROTHROMBIN TIME: CPT

## 2024-06-24 PROCEDURE — 36415 COLL VENOUS BLD VENIPUNCTURE: CPT

## 2024-06-24 PROCEDURE — 81001 URINALYSIS AUTO W/SCOPE: CPT

## 2024-06-24 PROCEDURE — 85025 COMPLETE CBC W/AUTO DIFF WBC: CPT

## 2024-06-24 PROCEDURE — 6370000000 HC RX 637 (ALT 250 FOR IP)

## 2024-06-24 PROCEDURE — 86850 RBC ANTIBODY SCREEN: CPT

## 2024-06-24 PROCEDURE — 83605 ASSAY OF LACTIC ACID: CPT

## 2024-06-24 PROCEDURE — 86140 C-REACTIVE PROTEIN: CPT

## 2024-06-24 RX ORDER — MAGNESIUM SULFATE IN WATER 40 MG/ML
2000 INJECTION, SOLUTION INTRAVENOUS PRN
Status: DISCONTINUED | OUTPATIENT
Start: 2024-06-24 | End: 2024-07-02 | Stop reason: HOSPADM

## 2024-06-24 RX ORDER — ONDANSETRON 4 MG/1
4 TABLET, ORALLY DISINTEGRATING ORAL EVERY 8 HOURS PRN
Status: DISCONTINUED | OUTPATIENT
Start: 2024-06-24 | End: 2024-07-02 | Stop reason: HOSPADM

## 2024-06-24 RX ORDER — SODIUM CHLORIDE 9 MG/ML
INJECTION, SOLUTION INTRAVENOUS PRN
Status: DISCONTINUED | OUTPATIENT
Start: 2024-06-24 | End: 2024-07-02 | Stop reason: HOSPADM

## 2024-06-24 RX ORDER — POTASSIUM CHLORIDE 7.45 MG/ML
10 INJECTION INTRAVENOUS PRN
Status: DISCONTINUED | OUTPATIENT
Start: 2024-06-24 | End: 2024-07-02 | Stop reason: HOSPADM

## 2024-06-24 RX ORDER — POTASSIUM CHLORIDE 20 MEQ/1
40 TABLET, EXTENDED RELEASE ORAL PRN
Status: DISCONTINUED | OUTPATIENT
Start: 2024-06-24 | End: 2024-07-02 | Stop reason: HOSPADM

## 2024-06-24 RX ORDER — OXYCODONE HYDROCHLORIDE 10 MG/1
10 TABLET ORAL EVERY 4 HOURS PRN
Status: DISCONTINUED | OUTPATIENT
Start: 2024-06-24 | End: 2024-06-30

## 2024-06-24 RX ORDER — ACETAMINOPHEN 650 MG/1
650 SUPPOSITORY RECTAL EVERY 6 HOURS PRN
Status: DISCONTINUED | OUTPATIENT
Start: 2024-06-24 | End: 2024-07-02 | Stop reason: HOSPADM

## 2024-06-24 RX ORDER — ONDANSETRON 2 MG/ML
4 INJECTION INTRAMUSCULAR; INTRAVENOUS EVERY 6 HOURS PRN
Status: DISCONTINUED | OUTPATIENT
Start: 2024-06-24 | End: 2024-07-02 | Stop reason: HOSPADM

## 2024-06-24 RX ORDER — ACETAMINOPHEN 325 MG/1
650 TABLET ORAL EVERY 6 HOURS PRN
Status: DISCONTINUED | OUTPATIENT
Start: 2024-06-24 | End: 2024-07-02 | Stop reason: HOSPADM

## 2024-06-24 RX ORDER — LISINOPRIL 5 MG/1
5 TABLET ORAL DAILY
Status: DISCONTINUED | OUTPATIENT
Start: 2024-06-24 | End: 2024-07-02 | Stop reason: HOSPADM

## 2024-06-24 RX ORDER — FUROSEMIDE 20 MG/1
20 TABLET ORAL DAILY
Status: DISCONTINUED | OUTPATIENT
Start: 2024-06-24 | End: 2024-06-26

## 2024-06-24 RX ORDER — POLYETHYLENE GLYCOL 3350 17 G/17G
17 POWDER, FOR SOLUTION ORAL DAILY PRN
Status: DISCONTINUED | OUTPATIENT
Start: 2024-06-24 | End: 2024-07-02 | Stop reason: HOSPADM

## 2024-06-24 RX ORDER — HYDROMORPHONE HYDROCHLORIDE 1 MG/ML
0.5 INJECTION, SOLUTION INTRAMUSCULAR; INTRAVENOUS; SUBCUTANEOUS EVERY 4 HOURS PRN
Status: DISCONTINUED | OUTPATIENT
Start: 2024-06-24 | End: 2024-06-30

## 2024-06-24 RX ORDER — NALOXONE HYDROCHLORIDE 0.4 MG/ML
0.4 INJECTION, SOLUTION INTRAMUSCULAR; INTRAVENOUS; SUBCUTANEOUS PRN
Status: DISCONTINUED | OUTPATIENT
Start: 2024-06-24 | End: 2024-07-02 | Stop reason: HOSPADM

## 2024-06-24 RX ORDER — PRAVASTATIN SODIUM 20 MG
40 TABLET ORAL DAILY
Status: DISCONTINUED | OUTPATIENT
Start: 2024-06-24 | End: 2024-06-26

## 2024-06-24 RX ORDER — HYDROMORPHONE HYDROCHLORIDE 1 MG/ML
0.25 INJECTION, SOLUTION INTRAMUSCULAR; INTRAVENOUS; SUBCUTANEOUS EVERY 4 HOURS PRN
Status: DISCONTINUED | OUTPATIENT
Start: 2024-06-24 | End: 2024-06-30

## 2024-06-24 RX ORDER — FUROSEMIDE 20 MG/1
20 TABLET ORAL DAILY
COMMUNITY
Start: 2024-06-21

## 2024-06-24 RX ORDER — IPRATROPIUM BROMIDE AND ALBUTEROL SULFATE 2.5; .5 MG/3ML; MG/3ML
1 SOLUTION RESPIRATORY (INHALATION) EVERY 4 HOURS PRN
Status: DISCONTINUED | OUTPATIENT
Start: 2024-06-24 | End: 2024-07-02 | Stop reason: HOSPADM

## 2024-06-24 RX ORDER — SODIUM CHLORIDE 9 MG/ML
INJECTION, SOLUTION INTRAVENOUS CONTINUOUS
Status: DISCONTINUED | OUTPATIENT
Start: 2024-06-24 | End: 2024-06-25

## 2024-06-24 RX ORDER — CARVEDILOL 3.12 MG/1
3.12 TABLET ORAL 2 TIMES DAILY WITH MEALS
Status: DISCONTINUED | OUTPATIENT
Start: 2024-06-24 | End: 2024-07-02 | Stop reason: HOSPADM

## 2024-06-24 RX ORDER — FUROSEMIDE 10 MG/ML
20 INJECTION INTRAMUSCULAR; INTRAVENOUS ONCE
Status: COMPLETED | OUTPATIENT
Start: 2024-06-24 | End: 2024-06-24

## 2024-06-24 RX ORDER — OXYCODONE HYDROCHLORIDE 5 MG/1
5 TABLET ORAL EVERY 4 HOURS PRN
Status: DISCONTINUED | OUTPATIENT
Start: 2024-06-24 | End: 2024-06-30

## 2024-06-24 RX ORDER — SODIUM CHLORIDE 0.9 % (FLUSH) 0.9 %
5-40 SYRINGE (ML) INJECTION EVERY 12 HOURS SCHEDULED
Status: DISCONTINUED | OUTPATIENT
Start: 2024-06-24 | End: 2024-07-02 | Stop reason: HOSPADM

## 2024-06-24 RX ORDER — SODIUM CHLORIDE 0.9 % (FLUSH) 0.9 %
5-40 SYRINGE (ML) INJECTION PRN
Status: DISCONTINUED | OUTPATIENT
Start: 2024-06-24 | End: 2024-07-02 | Stop reason: HOSPADM

## 2024-06-24 RX ADMIN — HYDROMORPHONE HYDROCHLORIDE 0.25 MG: 1 INJECTION, SOLUTION INTRAMUSCULAR; INTRAVENOUS; SUBCUTANEOUS at 17:50

## 2024-06-24 RX ADMIN — OXYCODONE HYDROCHLORIDE 5 MG: 5 TABLET ORAL at 19:47

## 2024-06-24 RX ADMIN — FUROSEMIDE 20 MG: 10 INJECTION, SOLUTION INTRAMUSCULAR; INTRAVENOUS at 19:47

## 2024-06-24 RX ADMIN — SODIUM CHLORIDE: 9 INJECTION, SOLUTION INTRAVENOUS at 15:23

## 2024-06-24 RX ADMIN — SODIUM CHLORIDE, PRESERVATIVE FREE 10 ML: 5 INJECTION INTRAVENOUS at 19:48

## 2024-06-24 RX ADMIN — ACETAMINOPHEN 650 MG: 325 TABLET ORAL at 21:42

## 2024-06-24 RX ADMIN — FUROSEMIDE 20 MG: 20 TABLET ORAL at 19:47

## 2024-06-24 RX ADMIN — HYDROMORPHONE HYDROCHLORIDE 0.25 MG: 1 INJECTION, SOLUTION INTRAMUSCULAR; INTRAVENOUS; SUBCUTANEOUS at 21:18

## 2024-06-24 ASSESSMENT — PAIN SCALES - GENERAL
PAINLEVEL_OUTOF10: 6
PAINLEVEL_OUTOF10: 2
PAINLEVEL_OUTOF10: 6
PAINLEVEL_OUTOF10: 0
PAINLEVEL_OUTOF10: 3
PAINLEVEL_OUTOF10: 6
PAINLEVEL_OUTOF10: 4

## 2024-06-24 ASSESSMENT — PAIN DESCRIPTION - ORIENTATION
ORIENTATION: RIGHT

## 2024-06-24 ASSESSMENT — PAIN DESCRIPTION - LOCATION
LOCATION: HIP
LOCATION: KNEE
LOCATION: HIP

## 2024-06-24 ASSESSMENT — PAIN DESCRIPTION - DESCRIPTORS
DESCRIPTORS: ACHING
DESCRIPTORS: ACHING;DISCOMFORT
DESCRIPTORS: ACHING;DISCOMFORT

## 2024-06-24 NOTE — CONSULTS
Orthopaedic Inpatient Consultation    NAME:  Celsa Henderson   : 1930  MRN: [id number]    2024 10:35 AM    Requesting Physician: Dr. Leanne Moody    CHIEF COMPLAINT: Fall, right hip pain      HISTORY OF PRESENT ILLNESS: Ms. Henderson is a 89-year-old female who was admitted to the ER today after a fall at home on 2024.  She was attempting to get ready for a doctor's appointment when she fell.  Family arrived and was able to transport her to her appointment.  Ultimately, she returned home.  Due to a progressively enlarging blister about the right lower extremity which ultimately ruptured today, she presented to the ER for further evaluation.  Radiographs revealed a right basicervical proximal femur fracture.  Orthopedics was consulted for further evaluation and management.  On interview, she reports minimal pain at rest but increased pain about the hip with motion or activity.  She has some baseline confusion/hard of hearing.  No family is at bedside.  She states that she ambulates with a wheeled walker.  She denies any pain to remaining extremities.  Denies any chest, neck or back pain.    Past Medical History:        Diagnosis Date    CAD (coronary artery disease) 2013    Cardiomyopathy (HCC) 2013    EF 25%    CHF (congestive heart failure) ([facility])     Hyperlipemia     Dr. Persaud manages    Palliative care patient 2023    Peripheral artery disease (HCC)     Status post coronary artery stent placement 2013    LAD       Past Surgical History:        Procedure Laterality Date    APPENDECTOMY      BACK SURGERY      CARDIAC CATHETERIZATION  2013   [facility]    EF 20-25%    TONSILLECTOMY         Current Medications:   Prior to Admission medications    Medication Sig Start Date End Date Taking? Authorizing Provider   furosemide (LASIX) 20 MG tablet Take 1 tablet by mouth daily 24  Yes Provider, MD Shimon   ipratropium-albuterol (DUONEB) 0.5-2.5 (3) MG/3ML SOLN nebulizer solution

## 2024-06-24 NOTE — H&P
home lisinopril/carvedilol              - Monitor I's and O's closely              - Daily weights              - Monitor on telemetry    - EKG in AM     Wound of right leg   - Plan for vascular study after ortho surgery r/o DVT      Resolved Problems:    * No resolved hospital problems. *       DVT prophylaxis:  GI prophylaxis:     Signed:  IZA Anne - CNP, 6/24/2024 2:07 PM

## 2024-06-24 NOTE — ED PROVIDER NOTES
[facility] 3 KATINA/VAS/MED  eMERGENCY dEPARTMENT eNCOUnter      Pt Name: Celsa Henderson  MRN: [id number]  Birthdate 5/29/1930  Date of evaluation: 6/24/2024  Provider: Leanne Moody MD    CHIEF COMPLAINT       Chief Complaint   Patient presents with    Wound Check     Right leg          HISTORY OF PRESENT ILLNESS   (Location/Symptom, Timing/Onset,Context/Setting, Quality, Duration, Modifying Factors, Severity)  Note limiting factors.   Celsa Henderson is a 89 y.o. female who presents to the emergency department with right leg pain.  The patient had a fall on June 14.  She said that she had gotten up to go see her doctor when she fell.  She has pain in her right leg.  She followed up with doctors at her later that day and he did not feel that she needed to go to the hospital.  The patient has bruising down her right leg and her left leg.  She had a blister on her right leg which ruptured and leaked clear fluid today.  She says that she did not hit her head or lose consciousness.  She denies any neck or back pain.    HPI    NursingNotes were reviewed.    REVIEW OF SYSTEMS    (2-9 systems for level 4, 10 or more for level 5)     Review of Systems   Constitutional:  Negative for chills and fever.   HENT:  Negative for rhinorrhea and sore throat.    Respiratory:  Negative for shortness of breath.    Cardiovascular:  Negative for chest pain and leg swelling.   Gastrointestinal:  Negative for abdominal pain, diarrhea, nausea and vomiting.   Genitourinary:  Negative for difficulty urinating.   Musculoskeletal:  Negative for back pain and neck pain.        Right leg pain   Skin:  Positive for color change and wound. Negative for rash.   Neurological:  Negative for weakness and headaches.   Hematological:  Bruises/bleeds easily.   Psychiatric/Behavioral:  Negative for confusion.        A complete review of systems was performed and is negative except as noted above in the HPI.       PAST MEDICAL HISTORY     Past Medical History:   Diagnosis

## 2024-06-24 NOTE — ED NOTES
ED TO INPATIENT SBAR HANDOFF    Patient Name: Celsa Henderson   : 1930  89 y.o.   Family/Caregiver Present: Yes  Code Status Order: Prior    C-SSRS: Risk of Suicide: No Risk  Sitter No  Restraints:         Situation  Chief Complaint:   Chief Complaint   Patient presents with    Wound Check     Right leg      Patient Diagnosis: Intertrochanteric fracture of right femur, closed, initial encounter ([facility]) [S72.141A]     Brief Description of Patient's Condition: Intertrochanteric fracture of right femur, closed, initial encounter ([facility]) [S72.141A]  Mental Status: oriented, alert, coherent, logical, and thought processes intact  Arrived from: home    Imaging:   XR FEMUR RIGHT (MIN 2 VIEWS)   Final Result   Comminuted, displaced and angulated intertrochanteric fracture is present at the proximal right femur.  The femoral neck and head appear intact.  The distal femur appears intact.       Severe atherosclerotic vascular calcification.  Moderate chronic degenerative change of the right hip.           ______________________________________    Electronically signed by: POLINA ORANTES M.D.   Date:     2024   Time:    12:48       XR TIBIA FIBULA RIGHT (2 VIEWS)   Final Result   No fracture or dislocation.               ______________________________________    Electronically signed by: REGGIE STEWART M.D.   Date:     2024   Time:    12:43       XR CHEST PORTABLE   Final Result   The cardiomediastinal contours appear enlarged.  The lungs are well-aerated.  No pulmonary consolidation, effusion or pneumothorax.       Tortuosity of the descending thoracic aorta.  No acute cardiopulmonary process.           ______________________________________    Electronically signed by: POLINA ORANTES M.D.   Date:     2024   Time:    12:51       XR PELVIS (1-2 VIEWS)   Final Result   There is a acute displaced, comminuted and angulated fracture at the intertrochanteric portion of the right proximal femur.  Moderate chronic

## 2024-06-25 ENCOUNTER — APPOINTMENT (OUTPATIENT)
Age: 89
DRG: 481 | End: 2024-06-25
Attending: INTERNAL MEDICINE
Payer: MEDICARE

## 2024-06-25 ENCOUNTER — ANESTHESIA (OUTPATIENT)
Dept: OPERATING ROOM | Age: 89
End: 2024-06-25
Payer: MEDICARE

## 2024-06-25 ENCOUNTER — APPOINTMENT (OUTPATIENT)
Age: 89
DRG: 481 | End: 2024-06-25
Attending: ORTHOPAEDIC SURGERY
Payer: MEDICARE

## 2024-06-25 ENCOUNTER — APPOINTMENT (OUTPATIENT)
Dept: GENERAL RADIOLOGY | Age: 89
DRG: 481 | End: 2024-06-25
Payer: MEDICARE

## 2024-06-25 ENCOUNTER — ANESTHESIA EVENT (OUTPATIENT)
Dept: OPERATING ROOM | Age: 89
End: 2024-06-25
Payer: MEDICARE

## 2024-06-25 LAB
ANION GAP SERPL CALCULATED.3IONS-SCNC: 13 MMOL/L (ref 7–19)
BASOPHILS # BLD: 0.1 K/UL (ref 0–0.2)
BASOPHILS NFR BLD: 0.6 % (ref 0–1)
BUN SERPL-MCNC: 17 MG/DL (ref 8–23)
CALCIUM SERPL-MCNC: 8.5 MG/DL (ref 8.2–9.6)
CHLORIDE SERPL-SCNC: 98 MMOL/L (ref 98–111)
CO2 SERPL-SCNC: 25 MMOL/L (ref 22–29)
CREAT SERPL-MCNC: 0.9 MG/DL (ref 0.5–0.9)
EKG P AXIS: 59 DEGREES
EKG P-R INTERVAL: 172 MS
EKG Q-T INTERVAL: 414 MS
EKG QRS DURATION: 120 MS
EKG QTC CALCULATION (BAZETT): 454 MS
EKG T AXIS: 121 DEGREES
EOSINOPHIL # BLD: 0.2 K/UL (ref 0–0.6)
EOSINOPHIL NFR BLD: 2.1 % (ref 0–5)
ERYTHROCYTE [DISTWIDTH] IN BLOOD BY AUTOMATED COUNT: 13.4 % (ref 11.5–14.5)
GLUCOSE SERPL-MCNC: 74 MG/DL (ref 74–109)
HCT VFR BLD AUTO: 37.9 % (ref 37–47)
HGB BLD-MCNC: 12.2 G/DL (ref 12–16)
IMM GRANULOCYTES # BLD: 0 K/UL
LYMPHOCYTES # BLD: 1.9 K/UL (ref 1.1–4.5)
LYMPHOCYTES NFR BLD: 19.7 % (ref 20–40)
MCH RBC QN AUTO: 33.1 PG (ref 27–31)
MCHC RBC AUTO-ENTMCNC: 32.2 G/DL (ref 33–37)
MCV RBC AUTO: 102.7 FL (ref 81–99)
MONOCYTES # BLD: 0.8 K/UL (ref 0–0.9)
MONOCYTES NFR BLD: 8.8 % (ref 0–10)
NEUTROPHILS # BLD: 6.5 K/UL (ref 1.5–7.5)
NEUTS SEG NFR BLD: 68.5 % (ref 50–65)
PLATELET # BLD AUTO: 266 K/UL (ref 130–400)
PMV BLD AUTO: 9.2 FL (ref 9.4–12.3)
POTASSIUM SERPL-SCNC: 4.5 MMOL/L (ref 3.5–5)
RBC # BLD AUTO: 3.69 M/UL (ref 4.2–5.4)
SODIUM SERPL-SCNC: 136 MMOL/L (ref 136–145)
TROPONIN, HIGH SENSITIVITY: 86 NG/L (ref 0–14)
WBC # BLD AUTO: 9.5 K/UL (ref 4.8–10.8)

## 2024-06-25 PROCEDURE — 6370000000 HC RX 637 (ALT 250 FOR IP)

## 2024-06-25 PROCEDURE — C1769 GUIDE WIRE: HCPCS | Performed by: ORTHOPAEDIC SURGERY

## 2024-06-25 PROCEDURE — 7100000000 HC PACU RECOVERY - FIRST 15 MIN: Performed by: ORTHOPAEDIC SURGERY

## 2024-06-25 PROCEDURE — 6360000002 HC RX W HCPCS

## 2024-06-25 PROCEDURE — 0QS606Z REPOSITION RIGHT UPPER FEMUR WITH INTRAMEDULLARY INTERNAL FIXATION DEVICE, OPEN APPROACH: ICD-10-PCS | Performed by: ORTHOPAEDIC SURGERY

## 2024-06-25 PROCEDURE — 2580000003 HC RX 258: Performed by: ORTHOPAEDIC SURGERY

## 2024-06-25 PROCEDURE — 6360000002 HC RX W HCPCS: Performed by: ANESTHESIOLOGY

## 2024-06-25 PROCEDURE — P9045 ALBUMIN (HUMAN), 5%, 250 ML: HCPCS | Performed by: NURSE ANESTHETIST, CERTIFIED REGISTERED

## 2024-06-25 PROCEDURE — 36415 COLL VENOUS BLD VENIPUNCTURE: CPT

## 2024-06-25 PROCEDURE — 6360000002 HC RX W HCPCS: Performed by: ORTHOPAEDIC SURGERY

## 2024-06-25 PROCEDURE — 2580000003 HC RX 258: Performed by: ANESTHESIOLOGY

## 2024-06-25 PROCEDURE — 3700000000 HC ANESTHESIA ATTENDED CARE: Performed by: ORTHOPAEDIC SURGERY

## 2024-06-25 PROCEDURE — 94760 N-INVAS EAR/PLS OXIMETRY 1: CPT

## 2024-06-25 PROCEDURE — 6360000004 HC RX CONTRAST MEDICATION: Performed by: INTERNAL MEDICINE

## 2024-06-25 PROCEDURE — 80048 BASIC METABOLIC PNL TOTAL CA: CPT

## 2024-06-25 PROCEDURE — 2580000003 HC RX 258: Performed by: INTERNAL MEDICINE

## 2024-06-25 PROCEDURE — C8929 TTE W OR WO FOL WCON,DOPPLER: HCPCS

## 2024-06-25 PROCEDURE — 93005 ELECTROCARDIOGRAM TRACING: CPT | Performed by: ORTHOPAEDIC SURGERY

## 2024-06-25 PROCEDURE — 2700000000 HC OXYGEN THERAPY PER DAY

## 2024-06-25 PROCEDURE — 84484 ASSAY OF TROPONIN QUANT: CPT

## 2024-06-25 PROCEDURE — 73502 X-RAY EXAM HIP UNI 2-3 VIEWS: CPT

## 2024-06-25 PROCEDURE — 85025 COMPLETE CBC W/AUTO DIFF WBC: CPT

## 2024-06-25 PROCEDURE — 6360000002 HC RX W HCPCS: Performed by: NURSE ANESTHETIST, CERTIFIED REGISTERED

## 2024-06-25 PROCEDURE — 93010 ELECTROCARDIOGRAM REPORT: CPT | Performed by: INTERNAL MEDICINE

## 2024-06-25 PROCEDURE — C1713 ANCHOR/SCREW BN/BN,TIS/BN: HCPCS | Performed by: ORTHOPAEDIC SURGERY

## 2024-06-25 PROCEDURE — 2720000010 HC SURG SUPPLY STERILE: Performed by: ORTHOPAEDIC SURGERY

## 2024-06-25 PROCEDURE — 6370000000 HC RX 637 (ALT 250 FOR IP): Performed by: ORTHOPAEDIC SURGERY

## 2024-06-25 PROCEDURE — 2709999900 HC NON-CHARGEABLE SUPPLY: Performed by: ORTHOPAEDIC SURGERY

## 2024-06-25 PROCEDURE — 3700000001 HC ADD 15 MINUTES (ANESTHESIA): Performed by: ORTHOPAEDIC SURGERY

## 2024-06-25 PROCEDURE — 7100000001 HC PACU RECOVERY - ADDTL 15 MIN: Performed by: ORTHOPAEDIC SURGERY

## 2024-06-25 PROCEDURE — 99232 SBSQ HOSP IP/OBS MODERATE 35: CPT | Performed by: INTERNAL MEDICINE

## 2024-06-25 PROCEDURE — 94150 VITAL CAPACITY TEST: CPT

## 2024-06-25 PROCEDURE — 2580000003 HC RX 258: Performed by: NURSE ANESTHETIST, CERTIFIED REGISTERED

## 2024-06-25 PROCEDURE — 1200000000 HC SEMI PRIVATE

## 2024-06-25 PROCEDURE — 2500000003 HC RX 250 WO HCPCS: Performed by: NURSE ANESTHETIST, CERTIFIED REGISTERED

## 2024-06-25 PROCEDURE — 3600000014 HC SURGERY LEVEL 4 ADDTL 15MIN: Performed by: ORTHOPAEDIC SURGERY

## 2024-06-25 PROCEDURE — 3600000004 HC SURGERY LEVEL 4 BASE: Performed by: ORTHOPAEDIC SURGERY

## 2024-06-25 DEVICE — IMPLANTABLE DEVICE: Type: IMPLANTABLE DEVICE | Site: HIP | Status: FUNCTIONAL

## 2024-06-25 DEVICE — SCREW BNE L38MM DIA5MM TIB LT GRN TI ST CANN LOK FULL THRD: Type: IMPLANTABLE DEVICE | Site: HIP | Status: FUNCTIONAL

## 2024-06-25 RX ORDER — SODIUM CHLORIDE 9 MG/ML
INJECTION, SOLUTION INTRAVENOUS PRN
Status: DISCONTINUED | OUTPATIENT
Start: 2024-06-25 | End: 2024-06-25 | Stop reason: HOSPADM

## 2024-06-25 RX ORDER — LIDOCAINE HYDROCHLORIDE 10 MG/ML
INJECTION, SOLUTION EPIDURAL; INFILTRATION; INTRACAUDAL; PERINEURAL PRN
Status: DISCONTINUED | OUTPATIENT
Start: 2024-06-25 | End: 2024-06-25 | Stop reason: SDUPTHER

## 2024-06-25 RX ORDER — DEXAMETHASONE SODIUM PHOSPHATE 4 MG/ML
INJECTION, SOLUTION INTRA-ARTICULAR; INTRALESIONAL; INTRAMUSCULAR; INTRAVENOUS; SOFT TISSUE PRN
Status: DISCONTINUED | OUTPATIENT
Start: 2024-06-25 | End: 2024-06-25 | Stop reason: SDUPTHER

## 2024-06-25 RX ORDER — FENTANYL CITRATE 50 UG/ML
INJECTION, SOLUTION INTRAMUSCULAR; INTRAVENOUS PRN
Status: DISCONTINUED | OUTPATIENT
Start: 2024-06-25 | End: 2024-06-25 | Stop reason: SDUPTHER

## 2024-06-25 RX ORDER — IPRATROPIUM BROMIDE AND ALBUTEROL SULFATE 2.5; .5 MG/3ML; MG/3ML
1 SOLUTION RESPIRATORY (INHALATION)
Status: DISCONTINUED | OUTPATIENT
Start: 2024-06-25 | End: 2024-06-25 | Stop reason: HOSPADM

## 2024-06-25 RX ORDER — METOCLOPRAMIDE HYDROCHLORIDE 5 MG/ML
10 INJECTION INTRAMUSCULAR; INTRAVENOUS
Status: COMPLETED | OUTPATIENT
Start: 2024-06-25 | End: 2024-06-25

## 2024-06-25 RX ORDER — NALOXONE HYDROCHLORIDE 0.4 MG/ML
INJECTION, SOLUTION INTRAMUSCULAR; INTRAVENOUS; SUBCUTANEOUS PRN
Status: DISCONTINUED | OUTPATIENT
Start: 2024-06-25 | End: 2024-06-25 | Stop reason: HOSPADM

## 2024-06-25 RX ORDER — SODIUM CHLORIDE 0.9 % (FLUSH) 0.9 %
5-40 SYRINGE (ML) INJECTION EVERY 12 HOURS SCHEDULED
Status: DISCONTINUED | OUTPATIENT
Start: 2024-06-25 | End: 2024-06-25 | Stop reason: HOSPADM

## 2024-06-25 RX ORDER — HYDRALAZINE HYDROCHLORIDE 20 MG/ML
10 INJECTION INTRAMUSCULAR; INTRAVENOUS
Status: DISCONTINUED | OUTPATIENT
Start: 2024-06-25 | End: 2024-06-25 | Stop reason: HOSPADM

## 2024-06-25 RX ORDER — ALBUMIN, HUMAN INJ 5% 5 %
SOLUTION INTRAVENOUS PRN
Status: DISCONTINUED | OUTPATIENT
Start: 2024-06-25 | End: 2024-06-25 | Stop reason: SDUPTHER

## 2024-06-25 RX ORDER — ROCURONIUM BROMIDE 10 MG/ML
INJECTION, SOLUTION INTRAVENOUS PRN
Status: DISCONTINUED | OUTPATIENT
Start: 2024-06-25 | End: 2024-06-25 | Stop reason: SDUPTHER

## 2024-06-25 RX ORDER — HYDROMORPHONE HYDROCHLORIDE 1 MG/ML
0.25 INJECTION, SOLUTION INTRAMUSCULAR; INTRAVENOUS; SUBCUTANEOUS EVERY 5 MIN PRN
Status: DISCONTINUED | OUTPATIENT
Start: 2024-06-25 | End: 2024-06-25 | Stop reason: HOSPADM

## 2024-06-25 RX ORDER — DIPHENHYDRAMINE HYDROCHLORIDE 50 MG/ML
12.5 INJECTION INTRAMUSCULAR; INTRAVENOUS
Status: DISCONTINUED | OUTPATIENT
Start: 2024-06-25 | End: 2024-06-25 | Stop reason: HOSPADM

## 2024-06-25 RX ORDER — SODIUM CHLORIDE 9 MG/ML
INJECTION, SOLUTION INTRAVENOUS CONTINUOUS
Status: ACTIVE | OUTPATIENT
Start: 2024-06-25 | End: 2024-06-26

## 2024-06-25 RX ORDER — SODIUM CHLORIDE 0.9 % (FLUSH) 0.9 %
5-40 SYRINGE (ML) INJECTION PRN
Status: DISCONTINUED | OUTPATIENT
Start: 2024-06-25 | End: 2024-06-25 | Stop reason: HOSPADM

## 2024-06-25 RX ORDER — LABETALOL HYDROCHLORIDE 5 MG/ML
10 INJECTION, SOLUTION INTRAVENOUS
Status: DISCONTINUED | OUTPATIENT
Start: 2024-06-25 | End: 2024-06-25 | Stop reason: HOSPADM

## 2024-06-25 RX ORDER — ETOMIDATE 2 MG/ML
INJECTION INTRAVENOUS PRN
Status: DISCONTINUED | OUTPATIENT
Start: 2024-06-25 | End: 2024-06-25 | Stop reason: SDUPTHER

## 2024-06-25 RX ORDER — SODIUM CHLORIDE, SODIUM LACTATE, POTASSIUM CHLORIDE, CALCIUM CHLORIDE 600; 310; 30; 20 MG/100ML; MG/100ML; MG/100ML; MG/100ML
INJECTION, SOLUTION INTRAVENOUS CONTINUOUS
Status: DISCONTINUED | OUTPATIENT
Start: 2024-06-25 | End: 2024-06-25 | Stop reason: HOSPADM

## 2024-06-25 RX ORDER — ENOXAPARIN SODIUM 100 MG/ML
40 INJECTION SUBCUTANEOUS DAILY
Status: DISCONTINUED | OUTPATIENT
Start: 2024-06-26 | End: 2024-06-26

## 2024-06-25 RX ORDER — ONDANSETRON 2 MG/ML
INJECTION INTRAMUSCULAR; INTRAVENOUS PRN
Status: DISCONTINUED | OUTPATIENT
Start: 2024-06-25 | End: 2024-06-25 | Stop reason: SDUPTHER

## 2024-06-25 RX ADMIN — HYDROMORPHONE HYDROCHLORIDE 0.25 MG: 1 INJECTION, SOLUTION INTRAMUSCULAR; INTRAVENOUS; SUBCUTANEOUS at 23:00

## 2024-06-25 RX ADMIN — DEXAMETHASONE SODIUM PHOSPHATE 4 MG: 4 INJECTION, SOLUTION INTRAMUSCULAR; INTRAVENOUS at 07:36

## 2024-06-25 RX ADMIN — PHENYLEPHRINE HYDROCHLORIDE 100 MCG: 10 INJECTION INTRAVENOUS at 07:19

## 2024-06-25 RX ADMIN — SUGAMMADEX 200 MG: 100 INJECTION, SOLUTION INTRAVENOUS at 07:53

## 2024-06-25 RX ADMIN — WATER 2000 MG: 1 INJECTION INTRAMUSCULAR; INTRAVENOUS; SUBCUTANEOUS at 23:01

## 2024-06-25 RX ADMIN — HYDROMORPHONE HYDROCHLORIDE 0.25 MG: 1 INJECTION, SOLUTION INTRAMUSCULAR; INTRAVENOUS; SUBCUTANEOUS at 19:38

## 2024-06-25 RX ADMIN — FENTANYL CITRATE 50 MCG: 0.05 INJECTION, SOLUTION INTRAMUSCULAR; INTRAVENOUS at 07:02

## 2024-06-25 RX ADMIN — ETOMIDATE 10 MG: 2 INJECTION, SOLUTION INTRAVENOUS at 07:03

## 2024-06-25 RX ADMIN — ONDANSETRON 4 MG: 2 INJECTION INTRAMUSCULAR; INTRAVENOUS at 07:49

## 2024-06-25 RX ADMIN — ALBUMIN (HUMAN) 12.5 G: 12.5 INJECTION, SOLUTION INTRAVENOUS at 07:18

## 2024-06-25 RX ADMIN — WATER 2000 MG: 1 INJECTION INTRAMUSCULAR; INTRAVENOUS; SUBCUTANEOUS at 07:18

## 2024-06-25 RX ADMIN — OXYCODONE HYDROCHLORIDE 5 MG: 5 TABLET ORAL at 01:57

## 2024-06-25 RX ADMIN — WATER 2000 MG: 1 INJECTION INTRAMUSCULAR; INTRAVENOUS; SUBCUTANEOUS at 16:42

## 2024-06-25 RX ADMIN — ETOMIDATE 6 MG: 2 INJECTION, SOLUTION INTRAVENOUS at 07:07

## 2024-06-25 RX ADMIN — PHENYLEPHRINE HYDROCHLORIDE 100 MCG: 10 INJECTION INTRAVENOUS at 07:32

## 2024-06-25 RX ADMIN — HYDROMORPHONE HYDROCHLORIDE 0.25 MG: 1 INJECTION, SOLUTION INTRAMUSCULAR; INTRAVENOUS; SUBCUTANEOUS at 04:35

## 2024-06-25 RX ADMIN — SODIUM CHLORIDE, PRESERVATIVE FREE 10 ML: 5 INJECTION INTRAVENOUS at 15:24

## 2024-06-25 RX ADMIN — SODIUM CHLORIDE: 9 INJECTION, SOLUTION INTRAVENOUS at 10:12

## 2024-06-25 RX ADMIN — LIDOCAINE HYDROCHLORIDE 50 MG: 10 INJECTION, SOLUTION EPIDURAL; INFILTRATION; INTRACAUDAL; PERINEURAL at 07:03

## 2024-06-25 RX ADMIN — PRAVASTATIN SODIUM 40 MG: 20 TABLET ORAL at 16:42

## 2024-06-25 RX ADMIN — METOCLOPRAMIDE 10 MG: 5 INJECTION, SOLUTION INTRAMUSCULAR; INTRAVENOUS at 08:17

## 2024-06-25 RX ADMIN — Medication 10 ML: at 09:15

## 2024-06-25 RX ADMIN — ROCURONIUM BROMIDE 50 MG: 10 INJECTION, SOLUTION INTRAVENOUS at 07:03

## 2024-06-25 RX ADMIN — HYDROMORPHONE HYDROCHLORIDE 0.25 MG: 1 INJECTION, SOLUTION INTRAMUSCULAR; INTRAVENOUS; SUBCUTANEOUS at 00:38

## 2024-06-25 RX ADMIN — ALBUMIN (HUMAN) 12.5 G: 12.5 INJECTION, SOLUTION INTRAVENOUS at 07:36

## 2024-06-25 RX ADMIN — SODIUM CHLORIDE, POTASSIUM CHLORIDE, SODIUM LACTATE AND CALCIUM CHLORIDE: 600; 310; 30; 20 INJECTION, SOLUTION INTRAVENOUS at 06:49

## 2024-06-25 ASSESSMENT — PAIN SCALES - GENERAL
PAINLEVEL_OUTOF10: 2
PAINLEVEL_OUTOF10: 6
PAINLEVEL_OUTOF10: 0
PAINLEVEL_OUTOF10: 6
PAINLEVEL_OUTOF10: 0
PAINLEVEL_OUTOF10: 0
PAINLEVEL_OUTOF10: 6
PAINLEVEL_OUTOF10: 0

## 2024-06-25 ASSESSMENT — PAIN DESCRIPTION - LOCATION
LOCATION: HIP

## 2024-06-25 ASSESSMENT — PAIN DESCRIPTION - ORIENTATION
ORIENTATION: RIGHT

## 2024-06-25 ASSESSMENT — PAIN DESCRIPTION - DESCRIPTORS
DESCRIPTORS: ACHING;DISCOMFORT

## 2024-06-25 ASSESSMENT — LIFESTYLE VARIABLES: SMOKING_STATUS: 0

## 2024-06-25 NOTE — ANESTHESIA PRE PROCEDURE
AM    HCT 37.9 06/25/2024 12:31 AM    .7 06/25/2024 12:31 AM    RDW 13.4 06/25/2024 12:31 AM     06/25/2024 12:31 AM       CMP:   Lab Results   Component Value Date/Time     06/25/2024 12:31 AM    K 4.5 06/25/2024 12:31 AM    CL 98 06/25/2024 12:31 AM    CO2 25 06/25/2024 12:31 AM    BUN 17 06/25/2024 12:31 AM    CREATININE 0.9 06/25/2024 12:31 AM    GFRAA >59 02/08/2022 02:16 PM    LABGLOM 59 06/25/2024 12:31 AM    LABGLOM 53 02/08/2023 11:45 AM    GLUCOSE 74 06/25/2024 12:31 AM    CALCIUM 8.5 06/25/2024 12:31 AM    BILITOT 0.9 06/24/2024 11:10 AM    ALKPHOS 91 06/24/2024 11:10 AM    AST 15 06/24/2024 11:10 AM    ALT 9 06/24/2024 11:10 AM       POC Tests: No results for input(s): \"POCGLU\", \"POCNA\", \"POCK\", \"POCCL\", \"POCBUN\", \"POCHEMO\", \"POCHCT\" in the last 72 hours.    Coags:   Lab Results   Component Value Date/Time    PROTIME 13.0 06/24/2024 11:10 AM    INR 1.01 06/24/2024 11:10 AM    APTT 29.0 06/24/2024 11:10 AM    APTT 28.2 04/17/2013 05:21 PM       HCG (If Applicable): No results found for: \"PREGTESTUR\", \"PREGSERUM\", \"HCG\", \"HCGQUANT\"     ABGs:   Lab Results   Component Value Date/Time    PHART 7.300 12/31/2022 06:49 PM    PO2ART 112.0 12/31/2022 06:49 PM    GZL1OZZ 61.0 12/31/2022 06:49 PM    PRE3QWD 30.0 12/31/2022 06:49 PM    BEART 2.1 12/31/2022 06:49 PM    H4RPERLT 95.4 12/31/2022 06:49 PM        Type & Screen (If Applicable):  No results found for: \"LABABO\"    Drug/Infectious Status (If Applicable):  No results found for: \"HIV\", \"HEPCAB\"    COVID-19 Screening (If Applicable):   Lab Results   Component Value Date/Time    COVID19 Not Detected 12/31/2022 07:41 PM           Anesthesia Evaluation  Patient summary reviewed   no history of anesthetic complications:   Airway: Mallampati: II  TM distance: >3 FB   Neck ROM: full  Mouth opening: > = 3 FB   Dental: normal exam   (+) edentulous      Pulmonary: breath sounds clear to auscultation      (-) COPD, asthma, sleep apnea and not a

## 2024-06-25 NOTE — OP NOTE
Patient Name: Tiara  MRN: [id number]  : 1930    DATE of SURGERY: 2024    SURGEON: Lon Chan MD    ASSISTANT: NONE     PREOPERATIVE DIAGNOSIS:   Acute traumatic displaced basicervical fracture of the Right femur, initial encounter for closed fracture  Severe ischemic cardiomyopathy  Moderate aortic stenosis  CAD s/p stenting    POSTOPERATIVE DIAGNOSIS:   1.   Acute traumatic displaced basicervical fracture of the Right femur, initial encounter for closed fracture  2.   Severe ischemic cardiomyopathy  3.   Moderate aortic stenosis  4.   CAD s/p stenting    PROCEDURE PERFORMED:   1. Cephalomedullary nailing Right closed displaced intertrochanteric hip fracture      IMPLANTS: Synthes TFNA size [id number]    ANESTHESIA USED: General endotrachial anesthesia    OPERATIVE INDICATIONS: 89 y.o. female status post fall with the above named diagnosis.  Surgical indications include fracture displacement, stabilization of fracture, and mobilization of the patient.  Risks include, but are not limited to, anesthesia, bleeding, infection, pain, damage to local structures, need for further surgery, malunion, nonunion, fracture displacement, failure of hardware, intraoperative death.  Risks, benefits, and alternative were discussed and the patient wishes to proceed with surgery.    ESTIMATED BLOOD LOSS: 175 mL    DRAINS: None     COMPLICATIONS: None    SPECIMENS: None    PROCEDURE in DETAIL:  The patient was seen in the preoperative holding room, the informed consent was reviewed and signed, and the correct operative extremity marked with the patient’s agreement.  The patient was transported to the operating room, where a timeout was performed identifying the correct patient and operative site.  Perioperative antibiotics were administered prior to incision.    Once anesthetized, the operative extremity was placed into a well-padded boot. The patient was then transferred onto the [place] table in the supine position. The

## 2024-06-25 NOTE — CONSULTS
[facility]  Cardiology Consult      Requesting MD:  Edvin Sosa MD   Admit Status:         History obtained from:   [] Patient  [] Other (specify):     PROBLEM LIST:    Patient Active Problem List    Diagnosis Date Noted    Palliative care patient 01/03/2023     Priority: Medium    Medically noncompliant 12/31/2022     Priority: Medium    Systolic and diastolic CHF, acute on chronic ([facility]) 12/31/2022     Priority: Medium    PAD (peripheral artery disease) ([facility]) 10/24/2022     Priority: Medium    Decubitus ulcer of left heel, stage 3 ([facility]) 10/20/2022     Priority: Medium    Unstageable pressure ulcer of right heel ([facility]) 10/20/2022     Priority: Medium    Intertrochanteric fracture of right femur, closed, initial encounter ([facility]) 06/24/2024     Priority: Low     Assessment & Plan Note:            Elevated troponin 06/24/2024     Priority: Low    Wound of right leg 06/24/2024     Priority: Low    Closed displaced intertrochanteric fracture of right femur ([facility]) 06/24/2024     Priority: Low    Calculus of gallbladder without cholecystitis without obstruction      Priority: Low    Coronary artery disease involving native heart without angina pectoris      Priority: Low    Diverticulosis of intestine without bleeding      Priority: Low    Hyperlipidemia      Priority: Low    Pneumonia of right lower lobe due to infectious organism      Priority: Low    CAP (community acquired pneumonia) 03/29/2016     Priority: Low     Overview Note:     RLL      Cholelithiasis 03/29/2016     Priority: Low    Diverticulosis 03/29/2016     Priority: Low    Hyperlipemia 03/29/2016     Priority: Low     Overview Note:     Dr. Persaud manages      Chronic systolic congestive heart failure ([facility]) 03/29/2016     Priority: Low     Overview Note:     Systolic, EF appx 20% by cath 2013      Acute respiratory failure with hypoxia ([facility]) 03/29/2016     Priority: Low    AAA (abdominal aortic aneurysm) ([facility]) 03/29/2016

## 2024-06-25 NOTE — ANESTHESIA POSTPROCEDURE EVALUATION
Department of Anesthesiology  Postprocedure Note    Patient: Celsa Henderson  MRN: [id number]  YOB: 1930  Date of evaluation: 6/25/2024    Procedure Summary       Date: 06/25/24 Room / Location: [address]    Anesthesia Start: 0658 Anesthesia Stop: 0818    Procedure: HIP INTRAMEDULLARY NAIL CARLOS INSERTION (Right) Diagnosis:       Closed displaced intertrochanteric fracture of right femur, initial encounter ([facility])      (Closed displaced intertrochanteric fracture of right femur, initial encounter ([facility]) [S72.141A])    Surgeons: Lon Chan MD Responsible Provider: Naa Thomson APRN - CRNA    Anesthesia Type: general ASA Status: 4            Anesthesia Type: No value filed.    Aydee Phase I:      Aydee Phase II:      Anesthesia Post Evaluation    Patient location during evaluation: PACU  Patient participation: complete - patient participated  Level of consciousness: sleepy but conscious  Pain score: 0  Airway patency: patent  Nausea & Vomiting: no vomiting and nausea  Cardiovascular status: blood pressure returned to baseline  Respiratory status: acceptable  Hydration status: stable  Pain management: adequate    No notable events documented.

## 2024-06-25 NOTE — CARE COORDINATION
Pt has been drowsy post op.  Jocy Garcia is present in the room. Talked with her about dc planning. Pt has normally been living at home alone. She uses a walker. Pt has  neighbor Antoni that takes her to her appts and gets groceries for her.    Pt will need placement for rehab, pt had told Michelle prior to surgery that is she needed to go she wanted to go to [place] as pt lives in [place].  Gave Michelle a snf choice list, but she wants a referral sent to [place].   Called placed to [facility] with [facility].  Will follow. Insurance will require a precert.  Will need pt/ot ordered when ready.  Electronically signed by Yessy Pickett RN on 6/25/2024 at 4:16 PM

## 2024-06-26 LAB
25(OH)D3 SERPL-MCNC: 37.3 NG/ML
ALBUMIN SERPL-MCNC: 2.9 G/DL (ref 3.5–5.2)
ALP SERPL-CCNC: 84 U/L (ref 35–104)
ALT SERPL-CCNC: 6 U/L (ref 5–33)
ANION GAP SERPL CALCULATED.3IONS-SCNC: 15 MMOL/L (ref 7–19)
AST SERPL-CCNC: 14 U/L (ref 5–32)
BILIRUB SERPL-MCNC: 0.5 MG/DL (ref 0.2–1.2)
BUN SERPL-MCNC: 31 MG/DL (ref 8–23)
CALCIUM SERPL-MCNC: 8.3 MG/DL (ref 8.2–9.6)
CHLORIDE SERPL-SCNC: 97 MMOL/L (ref 98–111)
CO2 SERPL-SCNC: 20 MMOL/L (ref 22–29)
CREAT SERPL-MCNC: 1.4 MG/DL (ref 0.5–0.9)
CREAT UR-MCNC: 121.1 MG/DL (ref 28–217)
ECHO AV AREA PEAK VELOCITY: 0.7 CM2
ECHO AV AREA VTI: 0.8 CM2
ECHO AV AREA/BSA PEAK VELOCITY: 0.5 CM2/M2
ECHO AV AREA/BSA VTI: 0.5 CM2/M2
ECHO AV MEAN GRADIENT: 13 MMHG
ECHO AV MEAN VELOCITY: 1.7 M/S
ECHO AV PEAK GRADIENT: 25 MMHG
ECHO AV PEAK VELOCITY: 2.5 M/S
ECHO AV VELOCITY RATIO: 0.28
ECHO AV VTI: 41.2 CM
ECHO BSA: 1.53 M2
ECHO EST RA PRESSURE: 3 MMHG
ECHO IVC PROX: 1 CM
ECHO LA AREA 2C: 15.4 CM2
ECHO LA AREA 4C: 17.7 CM2
ECHO LA MAJOR AXIS: 5.1 CM
ECHO LA MINOR AXIS: 5.1 CM
ECHO LA VOL BP: 41 ML (ref 22–52)
ECHO LA VOL MOD A2C: 38 ML (ref 22–52)
ECHO LA VOL MOD A4C: 46 ML (ref 22–52)
ECHO LA VOL/BSA BIPLANE: 27 ML/M2 (ref 16–34)
ECHO LA VOLUME INDEX MOD A2C: 25 ML/M2 (ref 16–34)
ECHO LA VOLUME INDEX MOD A4C: 30 ML/M2 (ref 16–34)
ECHO LV E' LATERAL VELOCITY: 5 CM/S
ECHO LV EDV A2C: 98 ML
ECHO LV EDV A4C: 117 ML
ECHO LV EDV INDEX A4C: 77 ML/M2
ECHO LV EDV NDEX A2C: 65 ML/M2
ECHO LV EJECTION FRACTION A2C: 36 %
ECHO LV EJECTION FRACTION A4C: 35 %
ECHO LV EJECTION FRACTION BIPLANE: 34 % (ref 55–100)
ECHO LV ESV A2C: 63 ML
ECHO LV ESV A4C: 76 ML
ECHO LV ESV INDEX A2C: 42 ML/M2
ECHO LV ESV INDEX A4C: 50 ML/M2
ECHO LVOT AREA: 2.8 CM2
ECHO LVOT AV VTI INDEX: 0.28
ECHO LVOT DIAM: 1.9 CM
ECHO LVOT MEAN GRADIENT: 1 MMHG
ECHO LVOT PEAK GRADIENT: 2 MMHG
ECHO LVOT PEAK VELOCITY: 0.7 M/S
ECHO LVOT STROKE VOLUME INDEX: 21.8 ML/M2
ECHO LVOT SV: 32.9 ML
ECHO LVOT VTI: 11.6 CM
ECHO MV A VELOCITY: 0.4 M/S
ECHO MV AREA VTI: 1.1 CM2
ECHO MV E DECELERATION TIME (DT): 153 MS
ECHO MV E VELOCITY: 1.3 M/S
ECHO MV E/A RATIO: 3.25
ECHO MV E/E' LATERAL: 26
ECHO MV LVOT VTI INDEX: 2.7
ECHO MV MAX VELOCITY: 2 M/S
ECHO MV MEAN GRADIENT: 5 MMHG
ECHO MV MEAN VELOCITY: 0.9 M/S
ECHO MV PEAK GRADIENT: 16 MMHG
ECHO MV VTI: 31.3 CM
ECHO RIGHT VENTRICULAR SYSTOLIC PRESSURE (RVSP): 31 MMHG
ECHO RV TAPSE: 1.6 CM (ref 1.7–?)
ECHO TV REGURGITANT MAX VELOCITY: 2.65 M/S
ECHO TV REGURGITANT PEAK GRADIENT: 28 MMHG
EOSINOPHIL URNS QL MICRO: NORMAL
ERYTHROCYTE [DISTWIDTH] IN BLOOD BY AUTOMATED COUNT: 13.7 % (ref 11.5–14.5)
GLUCOSE SERPL-MCNC: 128 MG/DL (ref 74–109)
HCT VFR BLD AUTO: 29.4 % (ref 37–47)
HGB BLD-MCNC: 8.7 G/DL (ref 12–16)
MCH RBC QN AUTO: 32.5 PG (ref 27–31)
MCHC RBC AUTO-ENTMCNC: 29.6 G/DL (ref 33–37)
MCV RBC AUTO: 109.7 FL (ref 81–99)
PLATELET # BLD AUTO: 257 K/UL (ref 130–400)
PMV BLD AUTO: 9.2 FL (ref 9.4–12.3)
POTASSIUM SERPL-SCNC: 5.1 MMOL/L (ref 3.5–5)
PROT SERPL-MCNC: 5.1 G/DL (ref 6.6–8.7)
PTH-INTACT SERPL-MCNC: 66 PG/ML (ref 15–65)
RBC # BLD AUTO: 2.68 M/UL (ref 4.2–5.4)
SODIUM SERPL-SCNC: 132 MMOL/L (ref 136–145)
SODIUM UR-SCNC: <20 MMOL/L
URATE SERPL-MCNC: 7.2 MG/DL (ref 2.4–5.7)
UUN UR-MCNC: 551 MG/DL
WBC # BLD AUTO: 14.5 K/UL (ref 4.8–10.8)

## 2024-06-26 PROCEDURE — 2580000003 HC RX 258: Performed by: INTERNAL MEDICINE

## 2024-06-26 PROCEDURE — 6370000000 HC RX 637 (ALT 250 FOR IP)

## 2024-06-26 PROCEDURE — 84300 ASSAY OF URINE SODIUM: CPT

## 2024-06-26 PROCEDURE — 97530 THERAPEUTIC ACTIVITIES: CPT

## 2024-06-26 PROCEDURE — 6360000002 HC RX W HCPCS: Performed by: ORTHOPAEDIC SURGERY

## 2024-06-26 PROCEDURE — 2580000003 HC RX 258: Performed by: ORTHOPAEDIC SURGERY

## 2024-06-26 PROCEDURE — 1200000000 HC SEMI PRIVATE

## 2024-06-26 PROCEDURE — 83970 ASSAY OF PARATHORMONE: CPT

## 2024-06-26 PROCEDURE — 84540 ASSAY OF URINE/UREA-N: CPT

## 2024-06-26 PROCEDURE — 93306 TTE W/DOPPLER COMPLETE: CPT | Performed by: INTERNAL MEDICINE

## 2024-06-26 PROCEDURE — 82306 VITAMIN D 25 HYDROXY: CPT

## 2024-06-26 PROCEDURE — 6370000000 HC RX 637 (ALT 250 FOR IP): Performed by: ORTHOPAEDIC SURGERY

## 2024-06-26 PROCEDURE — 87205 SMEAR GRAM STAIN: CPT

## 2024-06-26 PROCEDURE — 85027 COMPLETE CBC AUTOMATED: CPT

## 2024-06-26 PROCEDURE — 84550 ASSAY OF BLOOD/URIC ACID: CPT

## 2024-06-26 PROCEDURE — 6370000000 HC RX 637 (ALT 250 FOR IP): Performed by: INTERNAL MEDICINE

## 2024-06-26 PROCEDURE — 97165 OT EVAL LOW COMPLEX 30 MIN: CPT

## 2024-06-26 PROCEDURE — 36415 COLL VENOUS BLD VENIPUNCTURE: CPT

## 2024-06-26 PROCEDURE — 80053 COMPREHEN METABOLIC PANEL: CPT

## 2024-06-26 PROCEDURE — 97161 PT EVAL LOW COMPLEX 20 MIN: CPT

## 2024-06-26 PROCEDURE — 51701 INSERT BLADDER CATHETER: CPT

## 2024-06-26 PROCEDURE — 82570 ASSAY OF URINE CREATININE: CPT

## 2024-06-26 PROCEDURE — 51798 US URINE CAPACITY MEASURE: CPT

## 2024-06-26 RX ORDER — PRAVASTATIN SODIUM 20 MG
40 TABLET ORAL DAILY
Status: DISCONTINUED | OUTPATIENT
Start: 2024-06-26 | End: 2024-07-02 | Stop reason: HOSPADM

## 2024-06-26 RX ORDER — ENOXAPARIN SODIUM 100 MG/ML
30 INJECTION SUBCUTANEOUS DAILY
Status: DISCONTINUED | OUTPATIENT
Start: 2024-06-27 | End: 2024-06-29

## 2024-06-26 RX ORDER — SODIUM CHLORIDE 9 MG/ML
INJECTION, SOLUTION INTRAVENOUS CONTINUOUS
Status: ACTIVE | OUTPATIENT
Start: 2024-06-26 | End: 2024-06-27

## 2024-06-26 RX ORDER — FUROSEMIDE 20 MG/1
20 TABLET ORAL DAILY
Status: DISCONTINUED | OUTPATIENT
Start: 2024-06-26 | End: 2024-07-02 | Stop reason: HOSPADM

## 2024-06-26 RX ADMIN — OXYCODONE HYDROCHLORIDE 10 MG: 10 TABLET ORAL at 14:52

## 2024-06-26 RX ADMIN — HYDROMORPHONE HYDROCHLORIDE 0.5 MG: 1 INJECTION, SOLUTION INTRAMUSCULAR; INTRAVENOUS; SUBCUTANEOUS at 12:12

## 2024-06-26 RX ADMIN — SODIUM CHLORIDE, PRESERVATIVE FREE 10 ML: 5 INJECTION INTRAVENOUS at 21:52

## 2024-06-26 RX ADMIN — FUROSEMIDE 20 MG: 20 TABLET ORAL at 06:01

## 2024-06-26 RX ADMIN — PRAVASTATIN SODIUM 40 MG: 20 TABLET ORAL at 06:01

## 2024-06-26 RX ADMIN — HYDROMORPHONE HYDROCHLORIDE 0.25 MG: 1 INJECTION, SOLUTION INTRAMUSCULAR; INTRAVENOUS; SUBCUTANEOUS at 03:23

## 2024-06-26 RX ADMIN — SODIUM CHLORIDE: 9 INJECTION, SOLUTION INTRAVENOUS at 18:42

## 2024-06-26 RX ADMIN — ENOXAPARIN SODIUM 40 MG: 100 INJECTION SUBCUTANEOUS at 09:00

## 2024-06-26 RX ADMIN — OXYCODONE HYDROCHLORIDE 5 MG: 5 TABLET ORAL at 05:16

## 2024-06-26 ASSESSMENT — PAIN DESCRIPTION - ORIENTATION
ORIENTATION: RIGHT

## 2024-06-26 ASSESSMENT — PAIN DESCRIPTION - LOCATION
LOCATION: HIP
LOCATION: HIP;LEG
LOCATION: HIP
LOCATION: HIP

## 2024-06-26 ASSESSMENT — PAIN SCALES - GENERAL
PAINLEVEL_OUTOF10: 7
PAINLEVEL_OUTOF10: 6
PAINLEVEL_OUTOF10: 0
PAINLEVEL_OUTOF10: 1
PAINLEVEL_OUTOF10: 4
PAINLEVEL_OUTOF10: 0
PAINLEVEL_OUTOF10: 10
PAINLEVEL_OUTOF10: 5

## 2024-06-26 ASSESSMENT — PAIN DESCRIPTION - DESCRIPTORS
DESCRIPTORS: ACHING;DISCOMFORT;BURNING
DESCRIPTORS: ACHING;DISCOMFORT;BURNING

## 2024-06-26 NOTE — CARE COORDINATION
[facility] offered a bed. Jocy Garcia aware and accepted bed offer.  Insurance will require a precert. Will start precert when attending/ortho ready.  [facility]   P  [phone] F  Electronically signed by Yessy Pickett RN on 6/26/2024 at 12:14 PM

## 2024-06-27 ENCOUNTER — APPOINTMENT (OUTPATIENT)
Dept: ULTRASOUND IMAGING | Age: 89
DRG: 481 | End: 2024-06-27
Payer: MEDICARE

## 2024-06-27 PROCEDURE — 2580000003 HC RX 258: Performed by: INTERNAL MEDICINE

## 2024-06-27 PROCEDURE — 76770 US EXAM ABDO BACK WALL COMP: CPT

## 2024-06-27 PROCEDURE — 6370000000 HC RX 637 (ALT 250 FOR IP)

## 2024-06-27 PROCEDURE — 94760 N-INVAS EAR/PLS OXIMETRY 1: CPT

## 2024-06-27 PROCEDURE — 1200000000 HC SEMI PRIVATE

## 2024-06-27 PROCEDURE — 6370000000 HC RX 637 (ALT 250 FOR IP): Performed by: ORTHOPAEDIC SURGERY

## 2024-06-27 PROCEDURE — 97530 THERAPEUTIC ACTIVITIES: CPT

## 2024-06-27 PROCEDURE — 6360000002 HC RX W HCPCS: Performed by: ORTHOPAEDIC SURGERY

## 2024-06-27 PROCEDURE — 6370000000 HC RX 637 (ALT 250 FOR IP): Performed by: INTERNAL MEDICINE

## 2024-06-27 RX ADMIN — SODIUM CHLORIDE: 9 INJECTION, SOLUTION INTRAVENOUS at 11:45

## 2024-06-27 RX ADMIN — OXYCODONE HYDROCHLORIDE 5 MG: 5 TABLET ORAL at 15:54

## 2024-06-27 RX ADMIN — LISINOPRIL 5 MG: 5 TABLET ORAL at 09:47

## 2024-06-27 RX ADMIN — FUROSEMIDE 20 MG: 20 TABLET ORAL at 09:46

## 2024-06-27 RX ADMIN — ENOXAPARIN SODIUM 30 MG: 100 INJECTION SUBCUTANEOUS at 09:47

## 2024-06-27 RX ADMIN — CARVEDILOL 3.12 MG: 3.12 TABLET, FILM COATED ORAL at 09:46

## 2024-06-27 RX ADMIN — ONDANSETRON 4 MG: 2 INJECTION INTRAMUSCULAR; INTRAVENOUS at 11:03

## 2024-06-27 RX ADMIN — OXYCODONE HYDROCHLORIDE 5 MG: 5 TABLET ORAL at 11:02

## 2024-06-27 RX ADMIN — PRAVASTATIN SODIUM 40 MG: 20 TABLET ORAL at 09:46

## 2024-06-27 ASSESSMENT — PAIN DESCRIPTION - DESCRIPTORS
DESCRIPTORS: ACHING;SORE
DESCRIPTORS: ACHING;DISCOMFORT
DESCRIPTORS: ACHING;BURNING;DISCOMFORT

## 2024-06-27 ASSESSMENT — PAIN DESCRIPTION - ORIENTATION
ORIENTATION: RIGHT
ORIENTATION: LEFT;RIGHT
ORIENTATION: RIGHT;LEFT

## 2024-06-27 ASSESSMENT — PAIN - FUNCTIONAL ASSESSMENT: PAIN_FUNCTIONAL_ASSESSMENT: PREVENTS OR INTERFERES SOME ACTIVE ACTIVITIES AND ADLS

## 2024-06-27 ASSESSMENT — PAIN DESCRIPTION - PAIN TYPE: TYPE: ACUTE PAIN;SURGICAL PAIN

## 2024-06-27 ASSESSMENT — PAIN SCALES - GENERAL
PAINLEVEL_OUTOF10: 6

## 2024-06-27 ASSESSMENT — PAIN DESCRIPTION - LOCATION
LOCATION: RIB CAGE;HIP
LOCATION: LEG
LOCATION: RIB CAGE;HIP

## 2024-06-28 LAB
ANION GAP SERPL CALCULATED.3IONS-SCNC: 8 MMOL/L (ref 7–19)
BASOPHILS # BLD: 0 K/UL (ref 0–0.2)
BASOPHILS NFR BLD: 0.1 % (ref 0–1)
BUN SERPL-MCNC: 22 MG/DL (ref 8–23)
CALCIUM SERPL-MCNC: 8.2 MG/DL (ref 8.2–9.6)
CHLORIDE SERPL-SCNC: 104 MMOL/L (ref 98–111)
CO2 SERPL-SCNC: 25 MMOL/L (ref 22–29)
CREAT SERPL-MCNC: 1 MG/DL (ref 0.5–0.9)
EOSINOPHIL # BLD: 0.1 K/UL (ref 0–0.6)
EOSINOPHIL NFR BLD: 0.5 % (ref 0–5)
ERYTHROCYTE [DISTWIDTH] IN BLOOD BY AUTOMATED COUNT: 14.2 % (ref 11.5–14.5)
GLUCOSE SERPL-MCNC: 114 MG/DL (ref 74–109)
HCT VFR BLD AUTO: 25.7 % (ref 37–47)
HGB BLD-MCNC: 8.1 G/DL (ref 12–16)
IMM GRANULOCYTES # BLD: 0 K/UL
LYMPHOCYTES # BLD: 1.6 K/UL (ref 1.1–4.5)
LYMPHOCYTES NFR BLD: 16.9 % (ref 20–40)
MCH RBC QN AUTO: 33.3 PG (ref 27–31)
MCHC RBC AUTO-ENTMCNC: 31.5 G/DL (ref 33–37)
MCV RBC AUTO: 105.8 FL (ref 81–99)
MONOCYTES # BLD: 0.8 K/UL (ref 0–0.9)
MONOCYTES NFR BLD: 8.5 % (ref 0–10)
NEUTROPHILS # BLD: 7.1 K/UL (ref 1.5–7.5)
NEUTS SEG NFR BLD: 73.6 % (ref 50–65)
PLATELET # BLD AUTO: 274 K/UL (ref 130–400)
PMV BLD AUTO: 9.4 FL (ref 9.4–12.3)
POTASSIUM SERPL-SCNC: 4.7 MMOL/L (ref 3.5–5)
RBC # BLD AUTO: 2.43 M/UL (ref 4.2–5.4)
SODIUM SERPL-SCNC: 137 MMOL/L (ref 136–145)
WBC # BLD AUTO: 9.7 K/UL (ref 4.8–10.8)

## 2024-06-28 PROCEDURE — 6370000000 HC RX 637 (ALT 250 FOR IP): Performed by: ORTHOPAEDIC SURGERY

## 2024-06-28 PROCEDURE — 94760 N-INVAS EAR/PLS OXIMETRY 1: CPT

## 2024-06-28 PROCEDURE — 80048 BASIC METABOLIC PNL TOTAL CA: CPT

## 2024-06-28 PROCEDURE — 1200000000 HC SEMI PRIVATE

## 2024-06-28 PROCEDURE — 6370000000 HC RX 637 (ALT 250 FOR IP)

## 2024-06-28 PROCEDURE — 6360000002 HC RX W HCPCS: Performed by: ORTHOPAEDIC SURGERY

## 2024-06-28 PROCEDURE — 36415 COLL VENOUS BLD VENIPUNCTURE: CPT

## 2024-06-28 PROCEDURE — 2580000003 HC RX 258: Performed by: ORTHOPAEDIC SURGERY

## 2024-06-28 PROCEDURE — 85025 COMPLETE CBC W/AUTO DIFF WBC: CPT

## 2024-06-28 PROCEDURE — 97530 THERAPEUTIC ACTIVITIES: CPT

## 2024-06-28 RX ADMIN — ENOXAPARIN SODIUM 30 MG: 100 INJECTION SUBCUTANEOUS at 08:32

## 2024-06-28 RX ADMIN — CARVEDILOL 3.12 MG: 3.12 TABLET, FILM COATED ORAL at 08:29

## 2024-06-28 RX ADMIN — OXYCODONE HYDROCHLORIDE 10 MG: 10 TABLET ORAL at 13:33

## 2024-06-28 RX ADMIN — ACETAMINOPHEN 650 MG: 325 TABLET ORAL at 23:00

## 2024-06-28 RX ADMIN — PRAVASTATIN SODIUM 40 MG: 20 TABLET ORAL at 08:33

## 2024-06-28 RX ADMIN — OXYCODONE HYDROCHLORIDE 10 MG: 10 TABLET ORAL at 04:21

## 2024-06-28 RX ADMIN — SODIUM CHLORIDE, PRESERVATIVE FREE 10 ML: 5 INJECTION INTRAVENOUS at 20:38

## 2024-06-28 RX ADMIN — SODIUM CHLORIDE, PRESERVATIVE FREE 10 ML: 5 INJECTION INTRAVENOUS at 08:33

## 2024-06-28 ASSESSMENT — PAIN DESCRIPTION - LOCATION
LOCATION: HIP
LOCATION: HIP
LOCATION: LEG

## 2024-06-28 ASSESSMENT — PAIN SCALES - GENERAL
PAINLEVEL_OUTOF10: 0
PAINLEVEL_OUTOF10: 8
PAINLEVEL_OUTOF10: 0
PAINLEVEL_OUTOF10: 4
PAINLEVEL_OUTOF10: 9
PAINLEVEL_OUTOF10: 10

## 2024-06-28 ASSESSMENT — PAIN SCALES - WONG BAKER
WONGBAKER_NUMERICALRESPONSE: NO HURT
WONGBAKER_NUMERICALRESPONSE: NO HURT

## 2024-06-28 ASSESSMENT — PAIN DESCRIPTION - DESCRIPTORS
DESCRIPTORS: ACHING;SHOOTING
DESCRIPTORS: ACHING
DESCRIPTORS: ACHING;THROBBING

## 2024-06-28 ASSESSMENT — PAIN - FUNCTIONAL ASSESSMENT: PAIN_FUNCTIONAL_ASSESSMENT: PREVENTS OR INTERFERES SOME ACTIVE ACTIVITIES AND ADLS

## 2024-06-28 ASSESSMENT — PAIN DESCRIPTION - ORIENTATION
ORIENTATION: RIGHT

## 2024-06-28 NOTE — CARE COORDINATION
06/28/24 1406   IMM Letter   IMM Letter given to Patient/Family/Significant other/Guardian/POA/by: antoinette rawls sw   IMM Letter date given: 06/28/24   IMM Letter time given: 0154     Second IMM given to patient and explained with patient verbalizing understanding.  All questions and concerns addressed     Signed letter placed in pt soft chart   Patient declined waiting 4 hr period prior to discharge.   Electronically signed by Antoinette Rawls on 6/28/2024 at 2:07 PM

## 2024-06-28 NOTE — CARE COORDINATION
Precert started for [facility]. Wont be able to dc unable precert obtained.  [facility]   P  [phone] F  Electronically signed by Yessy Pickett RN on 6/28/2024 at 1:32 PM

## 2024-06-29 LAB
ANION GAP SERPL CALCULATED.3IONS-SCNC: 7 MMOL/L (ref 7–19)
BACTERIA BLD CULT ORG #2: NORMAL
BACTERIA BLD CULT: NORMAL
BASOPHILS # BLD: 0 K/UL (ref 0–0.2)
BASOPHILS NFR BLD: 0.1 % (ref 0–1)
BUN SERPL-MCNC: 20 MG/DL (ref 8–23)
CALCIUM SERPL-MCNC: 8.5 MG/DL (ref 8.2–9.6)
CHLORIDE SERPL-SCNC: 104 MMOL/L (ref 98–111)
CO2 SERPL-SCNC: 26 MMOL/L (ref 22–29)
CREAT SERPL-MCNC: 0.7 MG/DL (ref 0.5–0.9)
EOSINOPHIL # BLD: 0.1 K/UL (ref 0–0.6)
EOSINOPHIL NFR BLD: 1.1 % (ref 0–5)
ERYTHROCYTE [DISTWIDTH] IN BLOOD BY AUTOMATED COUNT: 14.1 % (ref 11.5–14.5)
GLUCOSE SERPL-MCNC: 106 MG/DL (ref 74–109)
HCT VFR BLD AUTO: 24.9 % (ref 37–47)
HGB BLD-MCNC: 7.8 G/DL (ref 12–16)
IMM GRANULOCYTES # BLD: 0.1 K/UL
LYMPHOCYTES # BLD: 2 K/UL (ref 1.1–4.5)
LYMPHOCYTES NFR BLD: 22.9 % (ref 20–40)
MCH RBC QN AUTO: 33.5 PG (ref 27–31)
MCHC RBC AUTO-ENTMCNC: 31.3 G/DL (ref 33–37)
MCV RBC AUTO: 106.9 FL (ref 81–99)
MONOCYTES # BLD: 0.8 K/UL (ref 0–0.9)
MONOCYTES NFR BLD: 9.6 % (ref 0–10)
NEUTROPHILS # BLD: 5.7 K/UL (ref 1.5–7.5)
NEUTS SEG NFR BLD: 65.6 % (ref 50–65)
PLATELET # BLD AUTO: 276 K/UL (ref 130–400)
PMV BLD AUTO: 8.9 FL (ref 9.4–12.3)
POTASSIUM SERPL-SCNC: 5 MMOL/L (ref 3.5–5)
RBC # BLD AUTO: 2.33 M/UL (ref 4.2–5.4)
SODIUM SERPL-SCNC: 137 MMOL/L (ref 136–145)
WBC # BLD AUTO: 8.7 K/UL (ref 4.8–10.8)

## 2024-06-29 PROCEDURE — 6370000000 HC RX 637 (ALT 250 FOR IP): Performed by: ORTHOPAEDIC SURGERY

## 2024-06-29 PROCEDURE — 6370000000 HC RX 637 (ALT 250 FOR IP)

## 2024-06-29 PROCEDURE — 36415 COLL VENOUS BLD VENIPUNCTURE: CPT

## 2024-06-29 PROCEDURE — 94760 N-INVAS EAR/PLS OXIMETRY 1: CPT

## 2024-06-29 PROCEDURE — 80048 BASIC METABOLIC PNL TOTAL CA: CPT

## 2024-06-29 PROCEDURE — 2580000003 HC RX 258: Performed by: ORTHOPAEDIC SURGERY

## 2024-06-29 PROCEDURE — 85025 COMPLETE CBC W/AUTO DIFF WBC: CPT

## 2024-06-29 PROCEDURE — 6360000002 HC RX W HCPCS: Performed by: ORTHOPAEDIC SURGERY

## 2024-06-29 PROCEDURE — 1200000000 HC SEMI PRIVATE

## 2024-06-29 PROCEDURE — 6370000000 HC RX 637 (ALT 250 FOR IP): Performed by: INTERNAL MEDICINE

## 2024-06-29 RX ORDER — ENOXAPARIN SODIUM 100 MG/ML
40 INJECTION SUBCUTANEOUS DAILY
Status: DISCONTINUED | OUTPATIENT
Start: 2024-06-30 | End: 2024-07-02 | Stop reason: HOSPADM

## 2024-06-29 RX ADMIN — SODIUM CHLORIDE, PRESERVATIVE FREE 10 ML: 5 INJECTION INTRAVENOUS at 20:16

## 2024-06-29 RX ADMIN — OXYCODONE HYDROCHLORIDE 10 MG: 10 TABLET ORAL at 01:49

## 2024-06-29 RX ADMIN — CARVEDILOL 3.12 MG: 3.12 TABLET, FILM COATED ORAL at 09:01

## 2024-06-29 RX ADMIN — SODIUM CHLORIDE, PRESERVATIVE FREE 10 ML: 5 INJECTION INTRAVENOUS at 09:02

## 2024-06-29 RX ADMIN — ACETAMINOPHEN 650 MG: 325 TABLET ORAL at 20:21

## 2024-06-29 RX ADMIN — OXYCODONE HYDROCHLORIDE 5 MG: 5 TABLET ORAL at 13:40

## 2024-06-29 RX ADMIN — PRAVASTATIN SODIUM 40 MG: 20 TABLET ORAL at 09:01

## 2024-06-29 RX ADMIN — FUROSEMIDE 20 MG: 20 TABLET ORAL at 09:01

## 2024-06-29 RX ADMIN — ENOXAPARIN SODIUM 30 MG: 100 INJECTION SUBCUTANEOUS at 09:02

## 2024-06-29 RX ADMIN — LISINOPRIL 5 MG: 5 TABLET ORAL at 09:01

## 2024-06-29 ASSESSMENT — PAIN SCALES - GENERAL
PAINLEVEL_OUTOF10: 0
PAINLEVEL_OUTOF10: 8
PAINLEVEL_OUTOF10: 6
PAINLEVEL_OUTOF10: 0
PAINLEVEL_OUTOF10: 9

## 2024-06-29 ASSESSMENT — PAIN DESCRIPTION - DESCRIPTORS
DESCRIPTORS: ACHING
DESCRIPTORS: ACHING

## 2024-06-29 ASSESSMENT — PAIN DESCRIPTION - ORIENTATION
ORIENTATION: RIGHT
ORIENTATION: RIGHT

## 2024-06-29 ASSESSMENT — PAIN SCALES - WONG BAKER
WONGBAKER_NUMERICALRESPONSE: NO HURT
WONGBAKER_NUMERICALRESPONSE: HURTS WHOLE LOT
WONGBAKER_NUMERICALRESPONSE: NO HURT

## 2024-06-29 ASSESSMENT — PAIN DESCRIPTION - LOCATION
LOCATION: PELVIS
LOCATION: LEG

## 2024-06-30 LAB
ANION GAP SERPL CALCULATED.3IONS-SCNC: 8 MMOL/L (ref 7–19)
BASOPHILS # BLD: 0 K/UL (ref 0–0.2)
BASOPHILS NFR BLD: 0.1 % (ref 0–1)
BUN SERPL-MCNC: 18 MG/DL (ref 8–23)
CALCIUM SERPL-MCNC: 8.2 MG/DL (ref 8.2–9.6)
CHLORIDE SERPL-SCNC: 102 MMOL/L (ref 98–111)
CO2 SERPL-SCNC: 26 MMOL/L (ref 22–29)
CREAT SERPL-MCNC: 0.7 MG/DL (ref 0.5–0.9)
EOSINOPHIL # BLD: 0.2 K/UL (ref 0–0.6)
EOSINOPHIL NFR BLD: 1.7 % (ref 0–5)
ERYTHROCYTE [DISTWIDTH] IN BLOOD BY AUTOMATED COUNT: 14.5 % (ref 11.5–14.5)
GLUCOSE SERPL-MCNC: 96 MG/DL (ref 74–109)
HCT VFR BLD AUTO: 25.2 % (ref 37–47)
HGB BLD-MCNC: 7.9 G/DL (ref 12–16)
IMM GRANULOCYTES # BLD: 0.1 K/UL
LYMPHOCYTES # BLD: 2.7 K/UL (ref 1.1–4.5)
LYMPHOCYTES NFR BLD: 24.9 % (ref 20–40)
MCH RBC QN AUTO: 34.2 PG (ref 27–31)
MCHC RBC AUTO-ENTMCNC: 31.3 G/DL (ref 33–37)
MCV RBC AUTO: 109.1 FL (ref 81–99)
MONOCYTES # BLD: 1.1 K/UL (ref 0–0.9)
MONOCYTES NFR BLD: 10.1 % (ref 0–10)
NEUTROPHILS # BLD: 6.7 K/UL (ref 1.5–7.5)
NEUTS SEG NFR BLD: 62.4 % (ref 50–65)
PLATELET # BLD AUTO: 296 K/UL (ref 130–400)
PMV BLD AUTO: 9.6 FL (ref 9.4–12.3)
POTASSIUM SERPL-SCNC: 4.9 MMOL/L (ref 3.5–5)
RBC # BLD AUTO: 2.31 M/UL (ref 4.2–5.4)
SODIUM SERPL-SCNC: 136 MMOL/L (ref 136–145)
WBC # BLD AUTO: 10.8 K/UL (ref 4.8–10.8)

## 2024-06-30 PROCEDURE — 6370000000 HC RX 637 (ALT 250 FOR IP): Performed by: ORTHOPAEDIC SURGERY

## 2024-06-30 PROCEDURE — 36415 COLL VENOUS BLD VENIPUNCTURE: CPT

## 2024-06-30 PROCEDURE — 85025 COMPLETE CBC W/AUTO DIFF WBC: CPT

## 2024-06-30 PROCEDURE — 6370000000 HC RX 637 (ALT 250 FOR IP): Performed by: INTERNAL MEDICINE

## 2024-06-30 PROCEDURE — 80048 BASIC METABOLIC PNL TOTAL CA: CPT

## 2024-06-30 PROCEDURE — 2580000003 HC RX 258: Performed by: ORTHOPAEDIC SURGERY

## 2024-06-30 PROCEDURE — 1200000000 HC SEMI PRIVATE

## 2024-06-30 PROCEDURE — 94760 N-INVAS EAR/PLS OXIMETRY 1: CPT

## 2024-06-30 PROCEDURE — 6370000000 HC RX 637 (ALT 250 FOR IP)

## 2024-06-30 PROCEDURE — 6360000002 HC RX W HCPCS: Performed by: ORTHOPAEDIC SURGERY

## 2024-06-30 RX ORDER — HYDROCODONE BITARTRATE AND ACETAMINOPHEN 5; 325 MG/1; MG/1
1 TABLET ORAL EVERY 6 HOURS PRN
Status: DISCONTINUED | OUTPATIENT
Start: 2024-06-30 | End: 2024-07-02 | Stop reason: HOSPADM

## 2024-06-30 RX ADMIN — ACETAMINOPHEN 650 MG: 325 TABLET ORAL at 02:45

## 2024-06-30 RX ADMIN — HYDROCODONE BITARTRATE AND ACETAMINOPHEN 1 TABLET: 5; 325 TABLET ORAL at 12:37

## 2024-06-30 RX ADMIN — SODIUM CHLORIDE, PRESERVATIVE FREE 10 ML: 5 INJECTION INTRAVENOUS at 22:19

## 2024-06-30 RX ADMIN — CARVEDILOL 3.12 MG: 3.12 TABLET, FILM COATED ORAL at 17:46

## 2024-06-30 RX ADMIN — ENOXAPARIN SODIUM 40 MG: 100 INJECTION SUBCUTANEOUS at 08:44

## 2024-06-30 RX ADMIN — HYDROCODONE BITARTRATE AND ACETAMINOPHEN 1 TABLET: 5; 325 TABLET ORAL at 19:33

## 2024-06-30 RX ADMIN — PRAVASTATIN SODIUM 40 MG: 20 TABLET ORAL at 08:43

## 2024-06-30 RX ADMIN — CARVEDILOL 3.12 MG: 3.12 TABLET, FILM COATED ORAL at 08:44

## 2024-06-30 RX ADMIN — SODIUM CHLORIDE, PRESERVATIVE FREE 10 ML: 5 INJECTION INTRAVENOUS at 08:44

## 2024-06-30 ASSESSMENT — PAIN DESCRIPTION - LOCATION
LOCATION: FOOT
LOCATION: HIP
LOCATION: HIP

## 2024-06-30 ASSESSMENT — PAIN SCALES - GENERAL
PAINLEVEL_OUTOF10: 7
PAINLEVEL_OUTOF10: 10

## 2024-06-30 ASSESSMENT — PAIN DESCRIPTION - DESCRIPTORS: DESCRIPTORS: ACHING;STABBING;THROBBING

## 2024-06-30 ASSESSMENT — PAIN SCALES - WONG BAKER: WONGBAKER_NUMERICALRESPONSE: NO HURT

## 2024-06-30 ASSESSMENT — PAIN DESCRIPTION - ORIENTATION
ORIENTATION: RIGHT
ORIENTATION: RIGHT

## 2024-07-01 LAB
BASOPHILS # BLD: 0 K/UL (ref 0–0.2)
BASOPHILS NFR BLD: 0.2 % (ref 0–1)
EOSINOPHIL # BLD: 0.2 K/UL (ref 0–0.6)
EOSINOPHIL NFR BLD: 1.7 % (ref 0–5)
ERYTHROCYTE [DISTWIDTH] IN BLOOD BY AUTOMATED COUNT: 14.6 % (ref 11.5–14.5)
GLUCOSE BLD-MCNC: 129 MG/DL (ref 70–99)
HCT VFR BLD AUTO: 24.7 % (ref 37–47)
HGB BLD-MCNC: 7.9 G/DL (ref 12–16)
IMM GRANULOCYTES # BLD: 0.1 K/UL
LYMPHOCYTES # BLD: 2.3 K/UL (ref 1.1–4.5)
LYMPHOCYTES NFR BLD: 21.2 % (ref 20–40)
MCH RBC QN AUTO: 33.6 PG (ref 27–31)
MCHC RBC AUTO-ENTMCNC: 32 G/DL (ref 33–37)
MCV RBC AUTO: 105.1 FL (ref 81–99)
MONOCYTES # BLD: 1.1 K/UL (ref 0–0.9)
MONOCYTES NFR BLD: 9.7 % (ref 0–10)
NEUTROPHILS # BLD: 7.3 K/UL (ref 1.5–7.5)
NEUTS SEG NFR BLD: 66.6 % (ref 50–65)
PERFORMED ON: ABNORMAL
PLATELET # BLD AUTO: 274 K/UL (ref 130–400)
PMV BLD AUTO: 9.2 FL (ref 9.4–12.3)
RBC # BLD AUTO: 2.35 M/UL (ref 4.2–5.4)
WBC # BLD AUTO: 11 K/UL (ref 4.8–10.8)

## 2024-07-01 PROCEDURE — 6370000000 HC RX 637 (ALT 250 FOR IP): Performed by: INTERNAL MEDICINE

## 2024-07-01 PROCEDURE — 6370000000 HC RX 637 (ALT 250 FOR IP): Performed by: ORTHOPAEDIC SURGERY

## 2024-07-01 PROCEDURE — 97110 THERAPEUTIC EXERCISES: CPT

## 2024-07-01 PROCEDURE — 36415 COLL VENOUS BLD VENIPUNCTURE: CPT

## 2024-07-01 PROCEDURE — 1200000000 HC SEMI PRIVATE

## 2024-07-01 PROCEDURE — 6360000002 HC RX W HCPCS: Performed by: ORTHOPAEDIC SURGERY

## 2024-07-01 PROCEDURE — 6370000000 HC RX 637 (ALT 250 FOR IP)

## 2024-07-01 PROCEDURE — 2580000003 HC RX 258: Performed by: ORTHOPAEDIC SURGERY

## 2024-07-01 PROCEDURE — 97530 THERAPEUTIC ACTIVITIES: CPT

## 2024-07-01 PROCEDURE — 82962 GLUCOSE BLOOD TEST: CPT

## 2024-07-01 PROCEDURE — 94760 N-INVAS EAR/PLS OXIMETRY 1: CPT

## 2024-07-01 PROCEDURE — 85025 COMPLETE CBC W/AUTO DIFF WBC: CPT

## 2024-07-01 RX ADMIN — HYDROCODONE BITARTRATE AND ACETAMINOPHEN 1 TABLET: 5; 325 TABLET ORAL at 02:27

## 2024-07-01 RX ADMIN — CARVEDILOL 3.12 MG: 3.12 TABLET, FILM COATED ORAL at 17:28

## 2024-07-01 RX ADMIN — SODIUM CHLORIDE, PRESERVATIVE FREE 10 ML: 5 INJECTION INTRAVENOUS at 21:45

## 2024-07-01 RX ADMIN — SODIUM CHLORIDE, PRESERVATIVE FREE 10 ML: 5 INJECTION INTRAVENOUS at 09:11

## 2024-07-01 RX ADMIN — HYDROCODONE BITARTRATE AND ACETAMINOPHEN 1 TABLET: 5; 325 TABLET ORAL at 14:04

## 2024-07-01 RX ADMIN — PRAVASTATIN SODIUM 40 MG: 20 TABLET ORAL at 09:10

## 2024-07-01 RX ADMIN — HYDROCODONE BITARTRATE AND ACETAMINOPHEN 1 TABLET: 5; 325 TABLET ORAL at 19:02

## 2024-07-01 RX ADMIN — ENOXAPARIN SODIUM 40 MG: 100 INJECTION SUBCUTANEOUS at 09:10

## 2024-07-01 ASSESSMENT — PAIN DESCRIPTION - LOCATION
LOCATION: LEG
LOCATION: LEG
LOCATION: HIP
LOCATION: LEG
LOCATION: LEG

## 2024-07-01 ASSESSMENT — PAIN SCALES - GENERAL
PAINLEVEL_OUTOF10: 7
PAINLEVEL_OUTOF10: 0
PAINLEVEL_OUTOF10: 6
PAINLEVEL_OUTOF10: 10
PAINLEVEL_OUTOF10: 0
PAINLEVEL_OUTOF10: 10

## 2024-07-01 ASSESSMENT — PAIN DESCRIPTION - PAIN TYPE
TYPE: SURGICAL PAIN
TYPE: SURGICAL PAIN

## 2024-07-01 ASSESSMENT — PAIN DESCRIPTION - ORIENTATION
ORIENTATION: RIGHT

## 2024-07-01 ASSESSMENT — PAIN DESCRIPTION - DESCRIPTORS
DESCRIPTORS: ACHING
DESCRIPTORS: ACHING;DISCOMFORT

## 2024-07-01 ASSESSMENT — PAIN - FUNCTIONAL ASSESSMENT
PAIN_FUNCTIONAL_ASSESSMENT: PREVENTS OR INTERFERES SOME ACTIVE ACTIVITIES AND ADLS

## 2024-07-01 NOTE — CARE COORDINATION
Monica has approved for pt to go to [facility].  Can dc Tuesday 7/2 if cleared medcially.  [facility]   P  [phone] F  Electronically signed by Yessy Pickett RN on 7/1/2024 at 3:30 PM

## 2024-07-01 NOTE — CARE COORDINATION
07/01/24 1539   IMM Letter   IMM Letter given to Patient/Family/Significant other/Guardian/POA/by: letter signed by oralia RODRIGUEZ   IMM Letter date given: 07/01/24   IMM Letter time given: 1535     Electronically signed by Yessy Pickett RN on 7/1/2024 at 3:40 PM

## 2024-07-01 NOTE — CARE COORDINATION
Received a vm from Jacklyn at [facility], pt has an HMO policy and [facility] is not in network.  Waiting on a call back from Jacklyn to try an work out a one time contract.  Will follow.  Electronically signed by Yessy Pickett RN on 7/1/2024 at 8:21 AM

## 2024-07-01 NOTE — CARE COORDINATION
[facility] able to offer a bed. Notified the insurance about the change in facilities. Waiting on official approval for placment.    Electronically signed by Yessy Pickett RN on 7/1/2024 at 3:15 PM

## 2024-07-01 NOTE — CARE COORDINATION
Spoke with Jacklyn again as well as Barbie at [facility]. Have confirmed that pts inusrance is not in NEtwork with [facility].  Jacklyn provided a list of snf's that are in network with pts insurance. Will discuss the list with pt and pauline.  Electronically signed by Yessy Pickett RN on 7/1/2024 at 9:52 AM

## 2024-07-01 NOTE — DISCHARGE INSTRUCTIONS
1.  Weightbearing as tolerated to the right lower extremity.  2.  Dressing: Plan to keep dressing in place for 2 weeks unless it becomes saturated.  Then please apply a new Mepilex dressing.  Minimize dressing changes as able.  3.  Pain medication as prescribed.  4.  DVT prophylaxis: Aspirin 325 mg daily for 1 month after surgery.  5.  Physical and occupational therapy as prescribed.  6.  Call our clinic number or return to the nearest emergency room should she develop chest pain, shortness of breath, fever, chills or worsening pain not controlled with pain medication.

## 2024-07-01 NOTE — CARE COORDINATION
Niece arrived to go over new choices for snf placement since [facility] not in network.  Pt and niece are agreeable to referral to [facility].  Barbie with [facility] notified.  Will see if they have bed availability.  If not [facility] would be the back up choice.   Electronically signed by Yessy Pickett RN on 7/1/2024 at 1:58 PM

## 2024-07-02 VITALS
WEIGHT: 122 LBS | BODY MASS INDEX: 23.95 KG/M2 | DIASTOLIC BLOOD PRESSURE: 57 MMHG | TEMPERATURE: 98.1 F | HEART RATE: 80 BPM | SYSTOLIC BLOOD PRESSURE: 90 MMHG | OXYGEN SATURATION: 95 % | HEIGHT: 60 IN | RESPIRATION RATE: 16 BRPM

## 2024-07-02 LAB
ANION GAP SERPL CALCULATED.3IONS-SCNC: 9 MMOL/L (ref 7–19)
BASOPHILS # BLD: 0 K/UL (ref 0–0.2)
BASOPHILS NFR BLD: 0.2 % (ref 0–1)
BUN SERPL-MCNC: 13 MG/DL (ref 8–23)
CALCIUM SERPL-MCNC: 8.1 MG/DL (ref 8.2–9.6)
CHLORIDE SERPL-SCNC: 98 MMOL/L (ref 98–111)
CO2 SERPL-SCNC: 26 MMOL/L (ref 22–29)
CREAT SERPL-MCNC: 0.6 MG/DL (ref 0.5–0.9)
EOSINOPHIL # BLD: 0.1 K/UL (ref 0–0.6)
EOSINOPHIL NFR BLD: 1.3 % (ref 0–5)
ERYTHROCYTE [DISTWIDTH] IN BLOOD BY AUTOMATED COUNT: 14.9 % (ref 11.5–14.5)
GLUCOSE BLD-MCNC: 108 MG/DL (ref 70–99)
GLUCOSE BLD-MCNC: 120 MG/DL (ref 70–99)
GLUCOSE SERPL-MCNC: 100 MG/DL (ref 74–109)
HCT VFR BLD AUTO: 25.1 % (ref 37–47)
HGB BLD-MCNC: 8 G/DL (ref 12–16)
IMM GRANULOCYTES # BLD: 0.1 K/UL
LYMPHOCYTES # BLD: 1.8 K/UL (ref 1.1–4.5)
LYMPHOCYTES NFR BLD: 17.1 % (ref 20–40)
MCH RBC QN AUTO: 33.9 PG (ref 27–31)
MCHC RBC AUTO-ENTMCNC: 31.9 G/DL (ref 33–37)
MCV RBC AUTO: 106.4 FL (ref 81–99)
MONOCYTES # BLD: 1.1 K/UL (ref 0–0.9)
MONOCYTES NFR BLD: 10.3 % (ref 0–10)
NEUTROPHILS # BLD: 7.5 K/UL (ref 1.5–7.5)
NEUTS SEG NFR BLD: 70.5 % (ref 50–65)
PERFORMED ON: ABNORMAL
PERFORMED ON: ABNORMAL
PLATELET # BLD AUTO: 271 K/UL (ref 130–400)
PMV BLD AUTO: 9.2 FL (ref 9.4–12.3)
POTASSIUM SERPL-SCNC: 4.7 MMOL/L (ref 3.5–5)
RBC # BLD AUTO: 2.36 M/UL (ref 4.2–5.4)
SODIUM SERPL-SCNC: 133 MMOL/L (ref 136–145)
WBC # BLD AUTO: 10.6 K/UL (ref 4.8–10.8)

## 2024-07-02 PROCEDURE — 85025 COMPLETE CBC W/AUTO DIFF WBC: CPT

## 2024-07-02 PROCEDURE — 6370000000 HC RX 637 (ALT 250 FOR IP): Performed by: ORTHOPAEDIC SURGERY

## 2024-07-02 PROCEDURE — 6370000000 HC RX 637 (ALT 250 FOR IP): Performed by: INTERNAL MEDICINE

## 2024-07-02 PROCEDURE — 6370000000 HC RX 637 (ALT 250 FOR IP)

## 2024-07-02 PROCEDURE — 36415 COLL VENOUS BLD VENIPUNCTURE: CPT

## 2024-07-02 PROCEDURE — 6360000002 HC RX W HCPCS: Performed by: ORTHOPAEDIC SURGERY

## 2024-07-02 PROCEDURE — 80048 BASIC METABOLIC PNL TOTAL CA: CPT

## 2024-07-02 PROCEDURE — 94760 N-INVAS EAR/PLS OXIMETRY 1: CPT

## 2024-07-02 PROCEDURE — 2580000003 HC RX 258: Performed by: ORTHOPAEDIC SURGERY

## 2024-07-02 PROCEDURE — 82962 GLUCOSE BLOOD TEST: CPT

## 2024-07-02 RX ORDER — HYDROCODONE BITARTRATE AND ACETAMINOPHEN 5; 325 MG/1; MG/1
1 TABLET ORAL EVERY 6 HOURS PRN
Qty: 12 TABLET | Refills: 0 | Status: SHIPPED | OUTPATIENT
Start: 2024-07-02 | End: 2024-07-05

## 2024-07-02 RX ADMIN — SODIUM CHLORIDE, PRESERVATIVE FREE 10 ML: 5 INJECTION INTRAVENOUS at 08:52

## 2024-07-02 RX ADMIN — ENOXAPARIN SODIUM 40 MG: 100 INJECTION SUBCUTANEOUS at 08:41

## 2024-07-02 RX ADMIN — CARVEDILOL 3.12 MG: 3.12 TABLET, FILM COATED ORAL at 08:41

## 2024-07-02 RX ADMIN — HYDROCODONE BITARTRATE AND ACETAMINOPHEN 1 TABLET: 5; 325 TABLET ORAL at 08:41

## 2024-07-02 RX ADMIN — CARVEDILOL 3.12 MG: 3.12 TABLET, FILM COATED ORAL at 15:56

## 2024-07-02 RX ADMIN — FUROSEMIDE 20 MG: 20 TABLET ORAL at 08:41

## 2024-07-02 RX ADMIN — PRAVASTATIN SODIUM 40 MG: 20 TABLET ORAL at 08:41

## 2024-07-02 RX ADMIN — HYDROCODONE BITARTRATE AND ACETAMINOPHEN 1 TABLET: 5; 325 TABLET ORAL at 15:56

## 2024-07-02 RX ADMIN — LISINOPRIL 5 MG: 5 TABLET ORAL at 08:41

## 2024-07-02 ASSESSMENT — PAIN DESCRIPTION - ORIENTATION
ORIENTATION: RIGHT
ORIENTATION: RIGHT

## 2024-07-02 ASSESSMENT — PAIN - FUNCTIONAL ASSESSMENT
PAIN_FUNCTIONAL_ASSESSMENT: PREVENTS OR INTERFERES SOME ACTIVE ACTIVITIES AND ADLS
PAIN_FUNCTIONAL_ASSESSMENT: PREVENTS OR INTERFERES SOME ACTIVE ACTIVITIES AND ADLS

## 2024-07-02 ASSESSMENT — PAIN DESCRIPTION - LOCATION
LOCATION: LEG
LOCATION: LEG

## 2024-07-02 ASSESSMENT — PAIN DESCRIPTION - DESCRIPTORS
DESCRIPTORS: ACHING
DESCRIPTORS: ACHING

## 2024-07-02 ASSESSMENT — PAIN SCALES - GENERAL
PAINLEVEL_OUTOF10: 6
PAINLEVEL_OUTOF10: 6

## 2024-07-02 ASSESSMENT — PAIN SCALES - WONG BAKER: WONGBAKER_NUMERICALRESPONSE: NO HURT

## 2024-07-02 NOTE — PROGRESS NOTES
Automatic Dose Adjustment of                Subcutaneous Anticoagulant for Prophylaxis    Celsa Henderson is a 89 y.o. female.     Recent Labs     06/25/24  0031 06/26/24  0738   CREATININE 0.9 1.4*       Estimated Creatinine Clearance: 19 mL/min (A) (based on SCr of 1.4 mg/dL (H)).    Weight:  Wt Readings from Last 1 Encounters:   06/25/24 55.3 kg (122 lb)           Pharmacy has adjusted subcutaneous anticoagulant for prophylaxis to Enoxaparin 30 mg SC daily based on the patient's weight and estimated CrCl per [facility] policy.               Electronically signed by Chika Paz RPh, BCPS, 6/26/2024,10:40 AM    
      [facility] Hospital[facility] Progress Note    Patient:  Celsa Henderson  YOB: 1930  Date of Service: 6/29/2024  MRN: [id number]   Acct: [id number]   Primary Care Physician: Rigo Persaud MD  Advance Directive: DNR  Admit Date: 6/24/2024       Hospital Day: 5      CHIEF COMPLAINT:     Chief Complaint   Patient presents with    Wound Check     Right leg        6/29/2024 7:22 AM  Subjective / Interval History:   06/29/2024  Patient seen and examined this a.m. No new complaints.  No acute changes or acute overnight event reported. Laying comfortably in bed in no apparent acute distress.  Denies any acute complaints or distress.      06/28/2024  No acute changes or acute overnight event reported. Patient seen and examined this a.m. No new complaints.   Laying comfortably in bed in no apparent acute distress.  Denies any acute complaints or distress.   Endorses overall improvement.        06/27/2024  Patient seen and examined this a.m. No new complaints.  No acute changes or acute overnight event reported. Laying comfortably in bed in no apparent acute distress.  Denies any acute complaints or distress.       06/26/2024  Patient seen and examined this a.m. No new complaints.  No acute changes or acute overnight event reported. Laying comfortably in bed in no apparent acute distress.  Denies any acute complaints or distress.  Endorses overall improvement.        06/25/2024  No acute changes or acute overnight event reported. Patient seen and examined. No new complaints.  Laying comfortably in bed in no apparent acute distress.  Denies any acute complaints or distress.  Patient Endorses overall improvement.        Review of Systems:   Review of Systems  ROS: 14 point review of systems is negative except as specifically addressed above.    ADULT DIET; Regular    Intake/Output Summary (Last 24 hours) at 6/29/2024 0722  Last data filed at 6/29/2024 0509  Gross per 24 hour   Intake --   Output 500 ml   Net -500 ml 
      [facility] Progress Note    Patient:  Celsa Henderson  YOB: 1930  Date of Service: 6/30/2024  MRN: [id number]   Acct: [id number]   Primary Care Physician: Rigo Persaud MD  Advance Directive: DNR  Admit Date: 6/24/2024       Hospital Day: 6      CHIEF COMPLAINT:     Chief Complaint   Patient presents with    Wound Check     Right leg        6/30/2024 8:29 AM  Subjective / Interval History:   06/30/2024  Patient seen and examined this a.m. No new complaints.  Patient reportedly did not sleep well last night.  No acute changes or acute overnight event reported. Laying comfortably in bed in no apparent acute distress.  Denies any acute complaints or distress. Endorses overall improvement.        06/29/2024  Patient seen and examined this a.m. No new complaints.  No acute changes or acute overnight event reported. Laying comfortably in bed in no apparent acute distress.  Denies any acute complaints or distress.      06/28/2024  No acute changes or acute overnight event reported. Patient seen and examined this a.m. No new complaints.   Laying comfortably in bed in no apparent acute distress.  Denies any acute complaints or distress.   Endorses overall improvement.        06/27/2024  Patient seen and examined this a.m. No new complaints.  No acute changes or acute overnight event reported. Laying comfortably in bed in no apparent acute distress.  Denies any acute complaints or distress.       06/26/2024  Patient seen and examined this a.m. No new complaints.  No acute changes or acute overnight event reported. Laying comfortably in bed in no apparent acute distress.  Denies any acute complaints or distress.  Endorses overall improvement.        06/25/2024  No acute changes or acute overnight event reported. Patient seen and examined. No new complaints.  Laying comfortably in bed in no apparent acute distress.  Denies any acute complaints or distress.  Patient Endorses overall improvement.  
      [facility]    Patient:  Celsa Henderson  YOB: 1930  Date of Service: 6/27/2024  MRN: [id number]   Acct: [id number]   Primary Care Physician: Rigo Persaud MD  Advance Directive: Full Code  Admit Date: 6/24/2024       Hospital Day: 3      CHIEF COMPLAINT:     Chief Complaint   Patient presents with    Wound Check     Right leg        6/27/2024 7:09 AM  Subjective / Interval History:   06/27/2024  Patient seen and examined this a.m. No new complaints.  No acute changes or acute overnight event reported. Laying comfortably in bed in no apparent acute distress.  Denies any acute complaints or distress.       06/26/2024  Patient seen and examined this a.m. No new complaints.  No acute changes or acute overnight event reported. Laying comfortably in bed in no apparent acute distress.  Denies any acute complaints or distress.  Endorses overall improvement.        06/25/2024  No acute changes or acute overnight event reported. Patient seen and examined. No new complaints.  Laying comfortably in bed in no apparent acute distress.  Denies any acute complaints or distress.  Patient Endorses overall improvement.        Review of Systems:   Review of Systems  ROS: 14 point review of systems is negative except as specifically addressed above.    ADULT DIET; Regular    Intake/Output Summary (Last 24 hours) at 6/27/2024 0709  Last data filed at 6/27/2024 0607  Gross per 24 hour   Intake --   Output 850 ml   Net -850 ml         Medications:   sodium chloride 100 mL/hr at 06/26/24 1842    sodium chloride       Current Facility-Administered Medications   Medication Dose Route Frequency Provider Last Rate Last Admin    furosemide (LASIX) tablet 20 mg  20 mg Oral Daily Neeraj Brown MD   20 mg at 06/26/24 0601    pravastatin (PRAVACHOL) tablet 40 mg  40 mg Oral Daily Liz Pulido APRN - CNP   40 mg at 06/26/24 0601    enoxaparin Sodium (LOVENOX) injection 30 mg  30 mg SubCUTAneous Daily Dustin 
      [facility] Hospital[facility] Progress Note    Patient:  Celsa Henderson  YOB: 1930  Date of Service: 7/1/2024  MRN: [id number]   Acct: [id number]   Primary Care Physician: Rigo Persaud MD  Advance Directive: DNR  Admit Date: 6/24/2024       Hospital Day: 7      CHIEF COMPLAINT:     Chief Complaint   Patient presents with    Wound Check     Right leg        7/1/2024 7:33 AM  Subjective / Interval History:   07/01/2024  Patient seen and examined this a.m. No new complaints.  No acute changes or acute overnight event reported. Laying comfortably in bed in no apparent acute distress.  Denies any acute complaints or distress.     06/30/2024  Patient seen and examined this a.m. No new complaints.  Patient reportedly did not sleep well last night.  No acute changes or acute overnight event reported. Laying comfortably in bed in no apparent acute distress.  Denies any acute complaints or distress. Endorses overall improvement.        06/29/2024  Patient seen and examined this a.m. No new complaints.  No acute changes or acute overnight event reported. Laying comfortably in bed in no apparent acute distress.  Denies any acute complaints or distress.      06/28/2024  No acute changes or acute overnight event reported. Patient seen and examined this a.m. No new complaints.   Laying comfortably in bed in no apparent acute distress.  Denies any acute complaints or distress.   Endorses overall improvement.        06/27/2024  Patient seen and examined this a.m. No new complaints.  No acute changes or acute overnight event reported. Laying comfortably in bed in no apparent acute distress.  Denies any acute complaints or distress.       06/26/2024  Patient seen and examined this a.m. No new complaints.  No acute changes or acute overnight event reported. Laying comfortably in bed in no apparent acute distress.  Denies any acute complaints or distress.  Endorses overall improvement.        06/25/2024  No acute changes or 
   07/01/24 1200   Subjective   Subjective I don' want to stand it hurts too much !  (Patient sitting EOB)   Pain Assessment   Pain Assessment 0-10   Pain Level 10  (BP has been too low for pain meds.)   Patient's Stated Pain Goal 0 - No pain   Pain Location Leg   Pain Orientation Right   Pain Descriptors Aching   Functional Pain Assessment Prevents or interferes some active activities and ADLs   Pain Type Surgical pain   Non-Pharmaceutical Pain Intervention(s) Rest   Cognition   Overall Cognitive Status WFL   Orientation   Overall Orientation Status WNL   Vitals   O2 Device None (Room air)   Bed Mobility Training   Bed Mobility Training Yes   Rolling Maximum assistance;Assist X2  (Rolled multiple times to change linens.)   Supine to Sit Maximum assistance;Assist X2   Sit to Supine Maximum assistance;Assist X2   Scooting Maximum assistance;Assist X2  (UP in bed)   Balance   Sitting Impaired  (Sat EOB 10 -15 minutes MIN/MOD assist.)   PT Exercises   A/AROM Exercises 10 Reps BL LE EX. X 2 Sets   Patient Education   Education Given To Patient   Education Provided Role of Therapy;Plan of Care;Transfer Training;Fall Prevention Strategies;Precautions   Education Provided Comments Use of call light staff assist.   Education Method Demonstration;Verbal   Education Outcome Verbalized understanding;Demonstrated understanding;Continued education needed   Other Specialty Interventions   Other Treatments/Modalities BTB alarm set call light needs in reach.   Assessment   Activity Tolerance Patient limited by pain   PT Plan of Care   Monday X         Electronically signed by Christiano Ramírez PTA on 7/1/24 at 12:19 PM CDT       
   Pharmacy Renal Adjustment    Celsa Henderson is a 89 y.o. female. Pharmacy has renally adjusted medications per protocol.    Recent Labs     06/28/24  1115 06/29/24  0404   BUN 22 20       Recent Labs     06/28/24  1115 06/29/24  0404   CREATININE 1.0* 0.7       Estimated Creatinine Clearance: 38 mL/min (based on SCr of 0.7 mg/dL).    Height:   Ht Readings from Last 1 Encounters:   06/28/24 1.524 m (5')     Weight:  Wt Readings from Last 1 Encounters:   06/25/24 55.3 kg (122 lb)       Plan: Adjust the following medications based on renal function:           Change Lovenox to 40 mg daily    Electronically signed by Andrew Fisher RPH on 6/29/2024 at 11:20 AM      
  Orthopedic Surgery Progress Note    Celsa Henderson  6/27/2024      Subjective:     No acute events overnight.  Pain controlled.  Denies chest pain or shortness of breath.    Objective:     Patient Vitals for the past 24 hrs:   BP Temp Temp src Pulse Resp SpO2   06/27/24 0504 (!) 125/52 97.5 °F (36.4 °C) Oral 85 18 92 %   06/27/24 0027 (!) 101/50 97.9 °F (36.6 °C) Oral 90 16 96 %   06/26/24 2002 (!) 105/51 98.2 °F (36.8 °C) Oral 95 18 98 %   06/26/24 1952 -- -- -- -- -- 94 %   06/26/24 1651 (!) 94/47 97.3 °F (36.3 °C) -- 95 16 93 %   06/26/24 1522 -- -- -- -- 16 --   06/26/24 1242 -- -- -- -- 18 --   06/26/24 0721 116/62 97.5 °F (36.4 °C) Temporal 97 16 97 %       General: Awake, alert, no acute distress  Respiratory: Nonlabored respiration  Cardiovascular: Regular rate  Musculoskeletal:  RLE: Dressings in place with minimal strikethrough.  Mild to moderate ecchymosis about right thigh.  Stable neurovascular exam to right lower extremity.       Data Review:  Recent Labs     06/25/24  0031 06/26/24  0738   HGB 12.2 8.7*     Recent Labs     06/26/24  0738   *   K 5.1*   CREATININE 1.4*       Assessment:     89 y/o F POD#2 s/p R hip CMN, significant medical history for severe ischemic cardiomyopathy, moderate aortic stenosis and coronary artery disease status post stenting    Plan:     1.  Pain: Minimize opioids as able  2.  Acute post-op blood loss anemia: No labs this morning, vital signs stable.  3.  DVT prophylaxis: PAS boots, Lovenox in house, renal dose, with plans to transition to oral anticoagulation for 1 month  4.  Physical therapy/Occupational therapy  5.  Activity: Weight bearing as tolerated, ice/elevate  6.  Dressing: May leave dressings in place for up to 2 weeks.  Remove dressings if they lose adhesion or becomes saturated.  7.  Disposition: Okay for discharge from orthopedic standpoint when medically stable, follow-up 2 weeks from surgery.       Electronically signed by Lon Chan MD on 
  Orthopedic Surgery Progress Note    Celsa Henderson  7/1/2024      Subjective:     No acute events overnight.  She describes some chest tightness overnight, denied difficulty breathing or shortness of breath.  Pain has improved throughout the day.  She attributes this to lack of pain medication.    Objective:     Patient Vitals for the past 24 hrs:   BP Temp Temp src Pulse Resp SpO2   07/01/24 1047 -- -- -- -- -- 93 %   07/01/24 0915 (!) 133/55 -- -- -- -- 98 %   07/01/24 0900 (!) 98/53 -- -- 95 -- 94 %   07/01/24 0740 (!) 88/46 -- Temporal 72 -- 94 %   07/01/24 0452 105/66 97.3 °F (36.3 °C) Oral 82 20 97 %   07/01/24 0257 -- -- -- -- 22 --   07/01/24 0227 -- -- -- -- 18 --   07/01/24 0226 105/63 -- -- 83 -- --   06/30/24 2017 (!) 96/50 98 °F (36.7 °C) Oral 80 18 98 %   06/30/24 2003 -- -- -- -- 20 --   06/30/24 1933 -- -- -- -- 18 --   06/30/24 1746 115/87 -- -- 87 -- --   06/30/24 1657 115/87 97.2 °F (36.2 °C) -- 91 18 92 %   06/30/24 1443 110/64 -- -- -- -- --       General: Awake, alert, no acute distress  Respiratory: Nonlabored respiration  Cardiovascular: Regular rate  Musculoskeletal: Right lower extremity: Dressings are in place, clean, dry and intact.  Significant ecchymosis on the posterior aspect of mid thigh.  Soft dressing to right lower extremity, clean and dry date of application was 7/1/2024.  Stable neurovascular exam to right lower extremity.       Data Review:  Recent Labs     06/30/24 0316 07/01/24  0333   HGB 7.9* 7.9*     Recent Labs     06/30/24 0316      K 4.9   CREATININE 0.7       Assessment:     89 y/o F POD#6 s/p R hip CMN, significant medical history for severe ischemic cardiomyopathy, moderate aortic stenosis and coronary artery disease status post stenting     Plan:     1.  Pain: Continue current regimen  2.  Acute post-op blood loss anemia: H/H 7.9/24.7-stable, BP low this morning, improved recent reading, asymptomatic  3.  DVT prophylaxis: PAS boots, Lovenox in house with 
 Occupational Therapy Initial Assessment  Date: 2024   Patient Name: Celsa Henderson  MRN: [id number]     : 1930    Date of Service: 2024    Discharge Recommendations:  24 hour supervision or assist, Patient would benefit from continued therapy after discharge       Assessment   Assessment: OT evaluation completed and tx initiated. Pt may benefit from continued skilled services to address functional deficits. Pt will likely progress with further tx. OT does not anticipate any environmental barriers to D/C to secondary location for rehab and assist with personal care.  REQUIRES OT FOLLOW-UP: Yes  Activity Tolerance  Activity Tolerance: Patient Tolerated treatment well              Patient Diagnosis(es): The primary encounter diagnosis was Displaced intertrochanteric fracture of right femur, initial encounter for closed fracture (HCC). Diagnoses of Wound of right lower extremity, initial encounter, Elevated troponin, Chest wall pain, Right calf pain, and Nonrheumatic aortic valve stenosis were also pertinent to this visit.    Past Medical History:   Past Medical History:   Diagnosis Date    CAD (coronary artery disease) 2013    Cardiomyopathy (HCC) 2013    EF 25%    CHF (congestive heart failure) ([facility])     Hyperlipemia     Dr. Persaud manages    Palliative care patient 2023    Peripheral artery disease (HCC)     Status post coronary artery stent placement 2013    LAD        Past Surgical History:   Past Surgical History:   Procedure Laterality Date    APPENDECTOMY      BACK SURGERY      CARDIAC CATHETERIZATION  2013   [facility]    EF 20-25%    HIP SURGERY Right 2024    HIP INTRAMEDULLARY NAIL CARLOS INSERTION performed by Lon Chan MD at [facility] OR    TONSILLECTOMY                Restrictions  Restrictions/Precautions  Restrictions/Precautions: Fall Risk, Weight Bearing  Required Braces or Orthoses?: No  Lower Extremity Weight Bearing Restrictions  Right Lower Extremity Weight 
Comprehensive Nutrition Assessment    Type and Reason for Visit:  Initial, RD Nutrition Re-Screen/LOS    Nutrition Recommendations/Plan:   Continue to monitor po intake     Malnutrition Assessment:  Malnutrition Status:  At risk for malnutrition (Comment) (07/02/24 1622)    Context:  Acute Illness     Findings of the 6 clinical characteristics of malnutrition:  Energy Intake:  Mild decrease in energy intake (Comment)  Weight Loss:  No significant weight loss     Body Fat Loss:  No significant body fat loss     Muscle Mass Loss:  No significant muscle mass loss    Fluid Accumulation:  No significant fluid accumulation Extremities   Strength:  Not Performed    Nutrition Assessment:    Following patient for LOS x 8 days.  Pt eating lunch at time of visit.  Stating not very hungry today.  PO intake has been ranging between 25-75% during admission.    Weight has remained stable in the past 6 months.  Hoping to go home soon.    Nutrition Related Findings:     6/30 Wound Type: Surgical Incision       Current Nutrition Intake & Therapies:    Average Meal Intake: 26-50%, 51-75%  Average Supplements Intake: None Ordered  ADULT DIET; Regular    Anthropometric Measures:  Height: 152.4 cm (5')  Ideal Body Weight (IBW): 100 lbs (45 kg)    Admission Body Weight: 55.3 kg (121 lb 14.6 oz)  Current Body Weight: 55.3 kg (121 lb 14.6 oz), 121.9 % IBW.    Current BMI (kg/m2): 23.8  Usual Body Weight: 55.7 kg (122 lb 12.7 oz) (12/31/2023)  % Weight Change (Calculated): -0.7  BMI Categories: Normal Weight (BMI 22.0 to 24.9) age over 65    Estimated Daily Nutrient Needs:  Energy Requirements Based On: Kcal/kg     Energy (kcal/day): 1124-0795 kcals (25-30 kcals/kg)  Weight Used for Protein Requirements: Current  Protein (g/day): 55-111g  Method Used for Fluid Requirements: 1 ml/kcal  Fluid (ml/day): 7584-1475 ml    Nutrition Diagnosis:   Increased nutrient needs related to increase demand for energy/nutrients as evidenced by 
Follow up visit:    Pt was sitting up in the chair.  She had a phone call from family and seems to be in good spirits.  She rates her pain at 5 at this time.  Pt is taking po pain medication earlier.  She was concerned with getting her purse and brush, she says, \"today is usually when I get my hair done and it's a mess.\"  Pt understands she will need to go to rehab and states her niece, Radha is helping with arrangements.  Verified pt's wishes for ACP.  She confirmed DNR status. She has a living will on file.    Assisted pt to get her belongings in reach and put her teeth in.  She was content and denies any other needs.    Electronically signed by Rosa Elena Urbina RN on 6/27/2024 at 1:25 PM      
Occupational Therapy  Facility/Department: [facility] 3 [facility]/VAS/MED  Occupational Therapy Treatment    Name: Celsa Henderson  : 1930  MRN: [id number]  Date of Service: 2024    Discharge Recommendations:  24 hour supervision or assist, Patient would benefit from continued therapy after discharge          Patient Diagnosis(es): The primary encounter diagnosis was Displaced intertrochanteric fracture of right femur, initial encounter for closed fracture (HCC). Diagnoses of Wound of right lower extremity, initial encounter, Elevated troponin, Chest wall pain, Right calf pain, and Nonrheumatic aortic valve stenosis were also pertinent to this visit.  Past Medical History:  has a past medical history of CAD (coronary artery disease), Cardiomyopathy (HCC), CHF (congestive heart failure) (HCC), Hyperlipemia, Palliative care patient, Peripheral artery disease (HCC), and Status post coronary artery stent placement.  Past Surgical History:  has a past surgical history that includes Cardiac catheterization (2013   [facility]); back surgery; Appendectomy; Tonsillectomy; and hip surgery (Right, 2024).           Assessment   Assessment: Pt. tearful at attempts on standing but was ModA  to stand to [place] and then CGa to maintain standing           Plan   Occupational Therapy Plan  Times Per Week: 3-5     Restrictions  Restrictions/Precautions  Restrictions/Precautions: Fall Risk, Weight Bearing  Required Braces or Orthoses?: No  Lower Extremity Weight Bearing Restrictions  Right Lower Extremity Weight Bearing: Weight Bearing As Tolerated    Subjective   General  Chart Reviewed: Yes  Patient assessed for rehabilitation services?: Yes  Family / Caregiver Present: No  General Comment  Comments: Pt. up in recliner over 2 hours, ready to return to bed.  did not rate  Social/Functional History  Social/Functional History  Lives With: Alone  Home Equipment: Walker - Rolling  ADL Assistance: Independent  Ambulation Assistance: 
Occupational Therapy  Facility/Department: [facility] 3 [facility]/VAS/MED  Occupational Therapy Initial Assessment    Name: Celsa Henderson  : 1930  MRN: [id number]  Date of Service: 2024    Discharge Recommendations:  24 hour supervision or assist, Patient would benefit from continued therapy after discharge          Patient Diagnosis(es): The primary encounter diagnosis was Displaced intertrochanteric fracture of right femur, initial encounter for closed fracture (HCC). Diagnoses of Wound of right lower extremity, initial encounter, Elevated troponin, Chest wall pain, Right calf pain, and Nonrheumatic aortic valve stenosis were also pertinent to this visit.  Past Medical History:  has a past medical history of CAD (coronary artery disease), Cardiomyopathy (HCC), CHF (congestive heart failure) (HCC), Hyperlipemia, Palliative care patient, Peripheral artery disease (HCC), and Status post coronary artery stent placement.  Past Surgical History:  has a past surgical history that includes Cardiac catheterization (2013   [facility]); back surgery; Appendectomy; Tonsillectomy; and hip surgery (Right, 2024).           Assessment   Assessment: Pt. able to stand from recliner to nunu jenkins with Mod A of 2 .  Still unable to take steps so Nunu stedy back to bed           Plan   Occupational Therapy Plan  Times Per Week: 3-5     Restrictions  Restrictions/Precautions  Restrictions/Precautions: Fall Risk, Weight Bearing  Required Braces or Orthoses?: No  Lower Extremity Weight Bearing Restrictions  Right Lower Extremity Weight Bearing: Weight Bearing As Tolerated    Subjective   General  Chart Reviewed: Yes  Patient assessed for rehabilitation services?: Yes  Family / Caregiver Present: No  General Comment  Comments: Pt. up in recliner over 2 hours, ready to return to bed.     Social/Functional History  Social/Functional History  Lives With: Alone  Home Equipment: Walker - Rolling  ADL Assistance: Independent  Ambulation 
Occupational Therapy  Facility/Department: [facility] 3 [facility]/VAS/MED  Occupational Therapy Treatment    Name: Celsa Henderson  : 1930  MRN: [id number]  Date of Service: 2024    Discharge Recommendations:  24 hour supervision or assist, Patient would benefit from continued therapy after discharge          Patient Diagnosis(es): The primary encounter diagnosis was Displaced intertrochanteric fracture of right femur, initial encounter for closed fracture (HCC). Diagnoses of Wound of right lower extremity, initial encounter, Elevated troponin, Chest wall pain, Right calf pain, and Nonrheumatic aortic valve stenosis were also pertinent to this visit.  Past Medical History:  has a past medical history of CAD (coronary artery disease), Cardiomyopathy (HCC), CHF (congestive heart failure) (HCC), Hyperlipemia, Palliative care patient, Peripheral artery disease (HCC), and Status post coronary artery stent placement.  Past Surgical History:  has a past surgical history that includes Cardiac catheterization (2013   [facility]); back surgery; Appendectomy; Tonsillectomy; and hip surgery (Right, 2024).           Assessment   Assessment: Pt. able to hold sitting balance EOB with SBA.  Positioned in chair with alarm in place.           Plan   Occupational Therapy Plan  Times Per Week: 3-5     Restrictions  Restrictions/Precautions  Restrictions/Precautions: Fall Risk, Weight Bearing  Required Braces or Orthoses?: No  Lower Extremity Weight Bearing Restrictions  Right Lower Extremity Weight Bearing: Weight Bearing As Tolerated    Subjective   General  Chart Reviewed: Yes  Patient assessed for rehabilitation services?: Yes  Family / Caregiver Present: No     Social/Functional History  Social/Functional History  Lives With: Alone  Home Equipment: Walker - Rolling  ADL Assistance: Independent  Ambulation Assistance: Independent  Transfer Assistance: Independent  Additional Comments: FELL 6-14 AND HAS BEEN HAVING DIFFICULTY WITH 
Patient arrived to room 332-02 at 1515. She is alert and oriented x4. Telebox applied, she states she has no chest pain or chest pressure at this time. SCDs placed on patient. She rates her right hip pain a 4/10. Her vitals are stable. She is occupied by her niece at bedside. NPO until surgical decision is made.  
Patient's IV fluid cut down to KVO. Electrolytes WDL, Renal function WDL, Blood pressure stable. However, BNP elevated at >10k and Dr. Brown recommends being judicious with IV fluid.      Electronically signed by Delfino Canchola RN on 6/24/2024 at 8:29 PM    
Physical Therapy    Name: Celsa Henderson  MRN: [id number]  Date of service: 6/28/2024 06/28/24 1400   Restrictions/Precautions   Restrictions/Precautions Fall Risk;Weight Bearing   Required Braces or Orthoses? No   Lower Extremity Weight Bearing Restrictions   Right Lower Extremity Weight Bearing Weight Bearing As Tolerated   General   Diagnosis FALL, R FEMUR FX, CM NAIL BY DR BLANC   General Comment   Comments Pt in bed   Subjective   Subjective needed encouragment to participate   Social/Functional History   Additional Comments FELL 6-14 AND HAS BEEN HAVING DIFFICULTY WITH MOBILITY SINCE THAT TIME.   Subjective   Subjective states she is in so much pain   Pain did not rate   Observation/Palpation   Observation BRUISIING R LE. BANDAGE ON LOWER LEG   Bed mobility   Supine to Sit Maximum assistance   Sit to Supine Maximum assistance   Bed Mobility Comments pt crying sitting EOB   Transfers   Sit to Stand Moderate Assistance   Stand to Sit Moderate Assistance   Comment stood 3x in SS   Short Term Goals   Time Frame for Short Term Goals 2 WKSS   Short Term Goal 1 SUP<>SIT, MOD A   Short Term Goal 2 SIT<>STAND, MIN/MOD A   Short Term Goal 3 TF'S WITH RW, MIN/MOD A   Short Term Goal 4 AMB 25 FT WITH RW, MIN A   Activity Tolerance   Activity Tolerance Patient limited by endurance;Patient limited by pain   Assessment   Assessment Pt needs encouragement to participate. Max assist to bed mobility, mod assist to  SS. Stood 3x in SS. Returned pt to supine and left comfortable in bed with all needs in reach.   Discharge Recommendations Continue to assess pending progress;Patient would benefit from continued therapy after discharge   Physical Therapy Plan   General Plan 5-7 times per week   Therapy Duration 2 Weeks   Current Treatment Recommendations Strengthening;Functional mobility training;Transfer training;Gait training;Pain management;Positioning;Safety education & training;Therapeutic activities;Patient/Caregiver 
Physical Therapy  Facility/Department: [facility]/VAS/MED  Physical Therapy Initial Assessment    Name: Celsa Henderson  : 1930  MRN: [id number]  Date of Service: 2024    Discharge Recommendations:  Continue to assess pending progress, Patient would benefit from continued therapy after discharge (ANTICIPATE DC TO FACILITY FOR REHAB)          Patient Diagnosis(es): The primary encounter diagnosis was Displaced intertrochanteric fracture of right femur, initial encounter for closed fracture (HCC). Diagnoses of Wound of right lower extremity, initial encounter, Elevated troponin, Chest wall pain, Right calf pain, and Nonrheumatic aortic valve stenosis were also pertinent to this visit.  Past Medical History:  has a past medical history of CAD (coronary artery disease), Cardiomyopathy (HCC), CHF (congestive heart failure) (HCC), Hyperlipemia, Palliative care patient, Peripheral artery disease (HCC), and Status post coronary artery stent placement.  Past Surgical History:  has a past surgical history that includes Cardiac catheterization (2013   [facility]); back surgery; Appendectomy; Tonsillectomy; and hip surgery (Right, 2024).    Assessment   Body Structures, Functions, Activity Limitations Requiring Skilled Therapeutic Intervention: Decreased functional mobility ;Decreased ROM;Decreased strength;Decreased endurance;Decreased balance;Increased pain  Assessment: pt WOULD BENEFIT FROM SKILLED PT IN THIS SETTING TO ADDRESS HER MOBILITY DEFICITS POST R FEMUR FX AND REPAIR BY DR BLANC  Therapy Prognosis: Fair;Good  Decision Making: Low Complexity  Requires PT Follow-Up: Yes  Activity Tolerance  Activity Tolerance: Patient tolerated treatment well     Plan   Physical Therapy Plan  General Plan: 5-7 times per week  Therapy Duration: 2 Weeks  Current Treatment Recommendations: Strengthening, Functional mobility training, Transfer training, Gait training, Pain management, Positioning, Safety education & training, 
Physical Therapy  Name: Celsa Henderson  MRN:  [id number]  Date of service:  6/27/2024 06/27/24 1116   Restrictions/Precautions   Restrictions/Precautions Fall Risk;Weight Bearing   Required Braces or Orthoses? No   Lower Extremity Weight Bearing Restrictions   Right Lower Extremity Weight Bearing Weight Bearing As Tolerated   Subjective   Subjective Pt hesitant but agreeable.   Pain Assessment   Pain Assessment 0-10   Pain Level 6  (RN present with pain meds)   Pain Location Rib cage;Hip   Pain Orientation Right;Left   Pain Descriptors Aching;Sore   Functional Pain Assessment Prevents or interferes some active activities and ADLs   Pain Type Acute pain;Surgical pain   Bed Mobility   Supine to Sit Maximal assistance  (x2)   Transfers   Sit to Stand Moderate Assistance;2 Person Assistance   Stand to Sit Moderate Assistance   Comment pt stood in SS to TF to the chair   Short Term Goals   Time Frame for Short Term Goals 2 WKSS   Short Term Goal 1 SUP<>SIT, MOD A   Short Term Goal 2 SIT<>STAND, MIN/MOD A   Short Term Goal 3 TF'S WITH RW, MIN/MOD A   Short Term Goal 4 AMB 25 FT WITH RW, MIN A   Conditions Requiring Skilled Therapeutic Intervention   Body Structures, Functions, Activity Limitations Requiring Skilled Therapeutic Intervention Decreased functional mobility ;Decreased ROM;Decreased strength;Decreased endurance;Decreased balance;Increased pain   Assessment Pt requires assistance for all aspects of mobility, able to  SS to TF to the chair but limited by pain and decreased endurance. Will cont to progress as tolerated.   Activity Tolerance   Activity Tolerance Patient limited by pain;Patient limited by endurance   PT Plan of Care   Thursday X   Safety Devices   Type of Devices Call light within reach;Gait belt;Left in chair;Nurse notified         Electronically signed by Tl Bonilla PTA on 6/27/2024 at 11:22 AM     
Physical Therapy  Name: Celsa Henderson  MRN:  [id number]  Date of service:  6/27/2024 06/27/24 1413   Restrictions/Precautions   Restrictions/Precautions Fall Risk;Weight Bearing   Required Braces or Orthoses? No   Lower Extremity Weight Bearing Restrictions   Right Lower Extremity Weight Bearing Weight Bearing As Tolerated   Subjective   Subjective Pt ready to go BTB.   Bed Mobility   Sit to Supine Maximal assistance   Transfers   Sit to Stand Moderate Assistance;2 Person Assistance   Stand to Sit Moderate Assistance   Comment pt TF'd BTB via SS   Short Term Goals   Time Frame for Short Term Goals 2 WKSS   Short Term Goal 1 SUP<>SIT, MOD A   Short Term Goal 2 SIT<>STAND, MIN/MOD A   Short Term Goal 3 TF'S WITH RW, MIN/MOD A   Short Term Goal 4 AMB 25 FT WITH RW, MIN A   Conditions Requiring Skilled Therapeutic Intervention   Body Structures, Functions, Activity Limitations Requiring Skilled Therapeutic Intervention Decreased functional mobility ;Decreased ROM;Decreased strength;Decreased endurance;Decreased balance;Increased pain   Assessment Assisted pt BTB via SS, cont to require much assistance to stand and unable to shift weight to take steps at this time.   Activity Tolerance   Activity Tolerance Patient limited by endurance   PT Plan of Care   Thursday X   Safety Devices   Type of Devices Bed alarm in place;Call light within reach;Gait belt;Left in bed         Electronically signed by Tl Bonilla PTA on 6/27/2024 at 2:15 PM     
Pt was in OR Pool earlier today for surgery and was somewhat lethargic from the effects of the surgery. See Dr Chan's note for further information. Pt's IV was beeping. This  asked one of the floor nurses to come in and take care of the IV and pt says she is in pain. Floor nurse heard pt was in pain, and said she would notify pt's nurse. Pt says her pain is a 10 on a scale of 1 - 10. Pt says her mercy is Quaker and she agreed to prayer. Palliative care to follow up for further discussion, support, and goals of care. This  provided spiritual care with sustaining presence, mediation, and prayer. Pt expressed gratitude for spiritual care.       Electronically signed by Mary Behrens on 6/25/2024 at 2:18 PM    
Report received from ED nurse.  
This RN reached out to attending d/t pt. having no output this shift and only 85mL on bladder scan.   Attending ordered for straight cath along with several urine labs; renal US; Nacl at 100mL/hr.   Straight cath was completed and 100 output. Urine sent down to lab at this time.   
06/27/24 1554    Or    oxyCODONE HCl (OXY-IR) immediate release tablet 10 mg  10 mg Oral Q4H PRN Lon Chan MD   10 mg at 06/28/24 0421    HYDROmorphone HCl PF (DILAUDID) injection 0.25 mg  0.25 mg IntraVENous Q4H PRN Lon Chan MD   0.25 mg at 06/26/24 0323    Or    HYDROmorphone HCl PF (DILAUDID) injection 0.5 mg  0.5 mg IntraVENous Q4H PRN Lon Chan MD   0.5 mg at 06/26/24 1212    naloxone (NARCAN) injection 0.4 mg  0.4 mg IntraVENous PRN Lon Chan MD             sodium chloride        furosemide  20 mg Oral Daily    pravastatin  40 mg Oral Daily    enoxaparin  30 mg SubCUTAneous Daily    carvedilol  3.125 mg Oral BID WC    lisinopril  5 mg Oral Daily    sodium chloride flush  5-40 mL IntraVENous 2 times per day     ipratropium 0.5 mg-albuterol 2.5 mg, sodium chloride flush, sodium chloride, potassium chloride **OR** potassium alternative oral replacement **OR** potassium chloride, magnesium sulfate, ondansetron **OR** ondansetron, polyethylene glycol, acetaminophen **OR** acetaminophen, oxyCODONE **OR** oxyCODONE, HYDROmorphone **OR** HYDROmorphone, naloxone  ADULT DIET; Regular       Labs:   CBC with DIFF:   Recent Labs     06/26/24  0738   WBC 14.5*   RBC 2.68*   HGB 8.7*   HCT 29.4*   .7*   MCH 32.5*   MCHC 29.6*   RDW 13.7      MPV 9.2*         CMP/BMP:    Recent Labs     06/26/24  0738   *   K 5.1*   CL 97*   CO2 20*   ANIONGAP 15   GLUCOSE 128*   BUN 31*   CREATININE 1.4*   LABGLOM 35*   CALCIUM 8.3   BILITOT 0.5   ALKPHOS 84   ALT 6   AST 14           CRP:    No results for input(s): \"CRP\" in the last 72 hours.    Sed Rate:    No results for input(s): \"SEDRATE\" in the last 72 hours.        HgBA1c:  No components found for: \"HGBA1C\"  FLP:    Lab Results   Component Value Date/Time    TRIG 99 12/31/2022 05:55 PM    HDL 60 12/31/2022 05:55 PM    LDLDIRECT 43 04/17/2013 05:21 PM     TSH:  No results found for: \"TSH\"  Troponin T: No results for input(s): 
  UROBILINOGEN 1.0   BILIRUBINUR Negative   BLOODU MODERATE*   GLUCOSEU Negative         Culture Results:    No results for input(s): \"CXSURG\" in the last 720 hours.    Blood Culture Recent: No results for input(s): \"BC\" in the last 720 hours.    No results for input(s): \"BC\", \"BLOODCULT2\", \"ORG\" in the last 72 hours.          Cultures:   No results for input(s): \"CULTURE\" in the last 72 hours.  No results for input(s): \"BC\", \"BLOODCULT2\" in the last 72 hours.  No results for input(s): \"CXSURG\" in the last 72 hours.      No results for input(s): \"MG\", \"PHOS\" in the last 72 hours.  Recent Labs     06/24/24  1110   AST 15   ALT 9   BILITOT 0.9   ALKPHOS 91         RAD:   XR FOOT RIGHT (2 VIEWS)    Result Date: 6/24/2024  EXAM: RIGHT FOOT RADIOGRAPH  TECHNIQUE: 2 views.  Frontal and lateral.  HISTORY: Fall.  COMPARISON: None.  FINDINGS:  Bones: No acute fracture.  Bones are osteopenic.  Mild enthesopathic change of the calcaneus. Joints: Normal alignment. Joint spaces are maintained. Soft tissues: Vascular calcification. Other: None.      1.  Osteopenia.  No acute finding.      ______________________________________ Electronically signed by: FRANKLYN PAULINO M.D. Date:     06/24/2024 Time:    12:55     XR PELVIS (1-2 VIEWS)    Result Date: 6/24/2024  EXAM: PELVIS, SINGLE VIEW  HISTORY: Fall  COMPARISON: 06/24/2024      There is a acute displaced, comminuted and angulated fracture at the intertrochanteric portion of the right proximal femur.  Moderate chronic degenerative change of the right and left hip.  The right femoral neck and head remain intact.  The  proximal left femur is intact.  The bony pelvis appears intact.   ______________________________________ Electronically signed by: POLINA ORANTES M.D. Date:     06/24/2024 Time:    12:55     XR CHEST PORTABLE    Result Date: 6/24/2024  EXAM: CHEST, SINGLE VIEW  HISTORY: Fall.  Edema  COMPARISON: 01/02/2023      The cardiomediastinal contours appear enlarged.  The lungs 
enthesopathic change of the calcaneus. Joints: Normal alignment. Joint spaces are maintained. Soft tissues: Vascular calcification. Other: None.      1.  Osteopenia.  No acute finding.      ______________________________________ Electronically signed by: FRANKLYN PAULINO M.D. Date:     06/24/2024 Time:    12:55     XR PELVIS (1-2 VIEWS)    Result Date: 6/24/2024  EXAM: PELVIS, SINGLE VIEW  HISTORY: Fall  COMPARISON: 06/24/2024      There is a acute displaced, comminuted and angulated fracture at the intertrochanteric portion of the right proximal femur.  Moderate chronic degenerative change of the right and left hip.  The right femoral neck and head remain intact.  The  proximal left femur is intact.  The bony pelvis appears intact.   ______________________________________ Electronically signed by: POLINA ORANTES M.D. Date:     06/24/2024 Time:    12:55     XR CHEST PORTABLE    Result Date: 6/24/2024  EXAM: CHEST, SINGLE VIEW  HISTORY: Fall.  Edema  COMPARISON: 01/02/2023      The cardiomediastinal contours appear enlarged.  The lungs are well-aerated.  No pulmonary consolidation, effusion or pneumothorax.  Tortuosity of the descending thoracic aorta.  No acute cardiopulmonary process.   ______________________________________ Electronically signed by: POLINA ORANTES M.D. Date:     06/24/2024 Time:    12:51     XR FEMUR RIGHT (MIN 2 VIEWS)    Result Date: 6/24/2024  EXAM: RIGHT FEMUR, TWO VIEWS  HISTORY: Fall  COMPARISON: 06/24/2024      Comminuted, displaced and angulated intertrochanteric fracture is present at the proximal right femur.  The femoral neck and head appear intact.  The distal femur appears intact.  Severe atherosclerotic vascular calcification.  Moderate chronic degenerative change of the right hip.   ______________________________________ Electronically signed by: POLINA ORANTES M.D. Date:     06/24/2024 Time:    12:48     XR TIBIA FIBULA RIGHT (2 VIEWS)    Result Date: 6/24/2024  EXAM:  RADIOGRAPHS, RIGHT 
-500 ml       Physical Exam  Vitals and nursing note reviewed.   Constitutional:       General: She is not in acute distress.     Appearance: Normal appearance. She is not ill-appearing, toxic-appearing or diaphoretic.   HENT:      Head: Normocephalic and atraumatic.      Right Ear: External ear normal.      Left Ear: External ear normal.      Nose: Nose normal. No congestion or rhinorrhea.      Mouth/Throat:      Mouth: Mucous membranes are moist.      Pharynx: Oropharynx is clear. No oropharyngeal exudate or posterior oropharyngeal erythema.   Eyes:      General: No scleral icterus.        Right eye: No discharge.         Left eye: No discharge.      Extraocular Movements: Extraocular movements intact.      Conjunctiva/sclera: Conjunctivae normal.      Pupils: Pupils are equal, round, and reactive to light.   Cardiovascular:      Rate and Rhythm: Normal rate and regular rhythm.      Pulses: Normal pulses.      Heart sounds: Murmur heard.      No friction rub. No gallop.   Pulmonary:      Effort: Pulmonary effort is normal. No respiratory distress.      Breath sounds: Normal breath sounds. No stridor. No wheezing, rhonchi or rales.   Chest:      Chest wall: No tenderness.   Abdominal:      General: Bowel sounds are normal. There is no distension.      Palpations: Abdomen is soft.      Tenderness: There is no abdominal tenderness. There is no guarding or rebound.   Musculoskeletal:         General: No swelling, tenderness, deformity or signs of injury. Normal range of motion.      Cervical back: Normal range of motion and neck supple. No rigidity. No muscular tenderness.      Right lower leg: No edema.      Left lower leg: No edema.   Skin:     General: Skin is warm and dry.      Capillary Refill: Capillary refill takes less than 2 seconds.      Coloration: Skin is not jaundiced or pale.      Findings: No bruising, erythema, lesion or rash.   Neurological:      General: No focal deficit present.      Mental Status:

## 2024-07-02 NOTE — DISCHARGE SUMMARY
[address] [place] [address]  [facility]    DISCHARGE SUMMARY:        Celsa Henderson  :  1930  MRN:  [id number]    Admit date:  2024  Discharge date:   2024      Admitting Physician:  Edvin Sosa MD    Advance Directive: DNR    Consults Made:   IP CONSULT TO ORTHOPEDIC SURGERY  IP CONSULT TO CARDIOLOGY  IP CONSULT TO SPIRITUAL SERVICES  PALLIATIVE CARE EVAL      Primary Care Physician:  Rigo Persaud MD    Discharge Diagnoses:  Principal Problem:    Intertrochanteric fracture of right femur, closed, initial encounter ([facility])  Active Problems:    Mixed hyperlipidemia    Chronic systolic congestive heart failure (HCC)    Elevated troponin    Wound of right leg  Resolved Problems:    * No resolved hospital problems. *          Pertinent Labs:  CBC with DIFF:  Recent Labs     24   WBC 10.8 11.0* 10.6   RBC 2.31* 2.35* 2.36*   HGB 7.9* 7.9* 8.0*   HCT 25.2* 24.7* 25.1*   .1* 105.1* 106.4*   MCH 34.2* 33.6* 33.9*   MCHC 31.3* 32.0* 31.9*   RDW 14.5 14.6* 14.9*    274 271   MPV 9.6 9.2* 9.2*   NEUTOPHILPCT 62.4 66.6* 70.5*   LYMPHOPCT 24.9 21.2 17.1*   MONOPCT 10.1* 9.7 10.3*   BASOPCT 0.1 0.2 0.2   NEUTROABS 6.7 7.3 7.5   LYMPHSABS 2.7 2.3 1.8   MONOSABS 1.10* 1.10* 1.10*   EOSABS 0.20 0.20 0.10   BASOSABS 0.00 0.00 0.00       CMP/BMP:   Recent Labs     24    133*   K 4.9 4.7    98   CO2 26 26   ANIONGAP 8 9   GLUCOSE 96 100   BUN 18 13   CREATININE 0.7 0.6   LABGLOM 80 83   CALCIUM 8.2 8.1*         CRP:  No results for input(s): \"CRP\" in the last 72 hours.  Sed Rate:  No results for input(s): \"SEDRATE\" in the last 72 hours.      HgBA1c:  No components found for: \"HGBA1C\"  FLP:    Lab Results   Component Value Date/Time    TRIG 99 2022 05:55 PM    HDL 60 2022 05:55 PM    LDLDIRECT 43 2013 05:21 PM     TSH:  No results found for: \"TSH\"  Troponin T: No

## 2024-07-02 NOTE — PLAN OF CARE
Problem: Discharge Planning  Goal: Discharge to home or other facility with appropriate resources  6/24/2024 2037 by Delfino Canchola RN  Outcome: Progressing  6/24/2024 1701 by Tl Jennings RN  Outcome: Progressing     Problem: Safety - Adult  Goal: Free from fall injury  6/24/2024 2037 by Delfino Canchola RN  Outcome: Progressing  6/24/2024 1701 by Tl Jennings RN  Outcome: Progressing     Problem: Skin/Tissue Integrity  Goal: Absence of new skin breakdown  Description: 1.  Monitor for areas of redness and/or skin breakdown  2.  Assess vascular access sites hourly  3.  Every 4-6 hours minimum:  Change oxygen saturation probe site  4.  Every 4-6 hours:  If on nasal continuous positive airway pressure, respiratory therapy assess nares and determine need for appliance change or resting period.  6/24/2024 2037 by Delfino Canchola RN  Outcome: Progressing  6/24/2024 1701 by Tl Jennings RN  Outcome: Progressing     Problem: Pain  Goal: Verbalizes/displays adequate comfort level or baseline comfort level  6/24/2024 2037 by Delfino Canchola RN  Outcome: Progressing  6/24/2024 1701 by Tl Jennings RN  Outcome: Progressing     Problem: ABCDS Injury Assessment  Goal: Absence of physical injury  6/24/2024 2037 by Delfino Canchola RN  Outcome: Progressing  6/24/2024 1701 by Tl Jennings RN  Outcome: Progressing   Electronically signed by Delfino Canchola RN on 6/24/2024 at 8:37 PM    
  Problem: Discharge Planning  Goal: Discharge to home or other facility with appropriate resources  6/27/2024 1006 by Janna Du RN  Outcome: Progressing  Flowsheets (Taken 6/27/2024 1000)  Discharge to home or other facility with appropriate resources: Identify barriers to discharge with patient and caregiver  6/27/2024 0207 by Nu Sanchez RN  Outcome: Progressing     Problem: Safety - Adult  Goal: Free from fall injury  6/27/2024 1006 by Janna Du RN  Outcome: Progressing  6/27/2024 0207 by Nu Sanchez RN  Outcome: Progressing     Problem: Skin/Tissue Integrity  Goal: Absence of new skin breakdown  Description: 1.  Monitor for areas of redness and/or skin breakdown  2.  Assess vascular access sites hourly  3.  Every 4-6 hours minimum:  Change oxygen saturation probe site  4.  Every 4-6 hours:  If on nasal continuous positive airway pressure, respiratory therapy assess nares and determine need for appliance change or resting period.  6/27/2024 1006 by Janna Du RN  Outcome: Progressing  6/27/2024 0207 by Nu Sanchez RN  Outcome: Progressing     Problem: Pain  Goal: Verbalizes/displays adequate comfort level or baseline comfort level  6/27/2024 1006 by Janna Du RN  Outcome: Progressing  6/27/2024 0207 by Nu Sanchez RN  Outcome: Progressing     Problem: ABCDS Injury Assessment  Goal: Absence of physical injury  6/27/2024 1006 by Janna Du RN  Outcome: Progressing  6/27/2024 0207 by Nu Sanchez RN  Outcome: Progressing     Problem: Chronic Conditions and Co-morbidities  Goal: Patient's chronic conditions and co-morbidity symptoms are monitored and maintained or improved  6/27/2024 1006 by Janna Du RN  Outcome: Progressing  6/27/2024 0207 by Nu Sanchez RN  Outcome: Progressing     
  Problem: Discharge Planning  Goal: Discharge to home or other facility with appropriate resources  6/29/2024 1638 by Shanon Briggs RN  Outcome: Progressing     Problem: Safety - Adult  Goal: Free from fall injury  6/29/2024 2221 by Nitin Moody RN  Outcome: Progressing  Flowsheets (Taken 6/29/2024 2213)  Free From Fall Injury: Instruct family/caregiver on patient safety  6/29/2024 1638 by Shanon Briggs RN  Outcome: Progressing     Problem: Skin/Tissue Integrity  Goal: Absence of new skin breakdown  Description: 1.  Monitor for areas of redness and/or skin breakdown  2.  Assess vascular access sites hourly  3.  Every 4-6 hours minimum:  Change oxygen saturation probe site  4.  Every 4-6 hours:  If on nasal continuous positive airway pressure, respiratory therapy assess nares and determine need for appliance change or resting period.  6/29/2024 2221 by Nitin Moody RN  Outcome: Progressing  6/29/2024 1638 by Shanon Briggs RN  Outcome: Progressing     Problem: Pain  Goal: Verbalizes/displays adequate comfort level or baseline comfort level  6/29/2024 2221 by Nitin Moody RN  Outcome: Progressing  6/29/2024 1638 by Shanon Briggs RN  Outcome: Progressing     Problem: ABCDS Injury Assessment  Goal: Absence of physical injury  6/29/2024 2221 by Nitin Moody RN  Outcome: Progressing  Flowsheets (Taken 6/29/2024 2213)  Absence of Physical Injury: Implement safety measures based on patient assessment  6/29/2024 1638 by Shanon Briggs RN  Outcome: Progressing     Problem: Chronic Conditions and Co-morbidities  Goal: Patient's chronic conditions and co-morbidity symptoms are monitored and maintained or improved  6/29/2024 2221 by Nitin Moody RN  Outcome: Progressing  6/29/2024 1638 by Shanon Briggs RN  Outcome: Progressing     
  Problem: Discharge Planning  Goal: Discharge to home or other facility with appropriate resources  7/2/2024 1011 by Russ Kwong RN  Outcome: Progressing  Flowsheets (Taken 7/2/2024 1007)  Discharge to home or other facility with appropriate resources: Identify barriers to discharge with patient and caregiver  7/2/2024 0019 by Argenis Corbett RN  Outcome: Progressing     Problem: Safety - Adult  Goal: Free from fall injury  7/2/2024 1011 by Russ Kwong RN  Outcome: Progressing  7/2/2024 0019 by Argenis Corbett RN  Outcome: Progressing     Problem: Skin/Tissue Integrity  Goal: Absence of new skin breakdown  Description: 1.  Monitor for areas of redness and/or skin breakdown  2.  Assess vascular access sites hourly  3.  Every 4-6 hours minimum:  Change oxygen saturation probe site  4.  Every 4-6 hours:  If on nasal continuous positive airway pressure, respiratory therapy assess nares and determine need for appliance change or resting period.  7/2/2024 1011 by Russ Kwong RN  Outcome: Progressing  7/2/2024 0019 by Argenis Corbett RN  Outcome: Progressing     Problem: Pain  Goal: Verbalizes/displays adequate comfort level or baseline comfort level  7/2/2024 1011 by Russ Kwong RN  Outcome: Progressing  7/2/2024 0019 by Argenis Corbett RN  Outcome: Progressing     Problem: ABCDS Injury Assessment  Goal: Absence of physical injury  7/2/2024 1011 by Russ Kwong RN  Outcome: Progressing  7/2/2024 0019 by Argenis Corbett RN  Outcome: Progressing     Problem: Chronic Conditions and Co-morbidities  Goal: Patient's chronic conditions and co-morbidity symptoms are monitored and maintained or improved  7/2/2024 1011 by Russ Kwong RN  Outcome: Progressing  Flowsheets (Taken 7/2/2024 1007)  Care Plan - Patient's Chronic Conditions and Co-Morbidity Symptoms are Monitored and Maintained or Improved: Monitor and assess patient's chronic conditions and comorbid symptoms for stability, 
  Problem: Discharge Planning  Goal: Discharge to home or other facility with appropriate resources  Outcome: Progressing     Problem: Safety - Adult  Goal: Free from fall injury  Outcome: Progressing     Problem: Skin/Tissue Integrity  Goal: Absence of new skin breakdown  Description: 1.  Monitor for areas of redness and/or skin breakdown  2.  Assess vascular access sites hourly  3.  Every 4-6 hours minimum:  Change oxygen saturation probe site  4.  Every 4-6 hours:  If on nasal continuous positive airway pressure, respiratory therapy assess nares and determine need for appliance change or resting period.  Outcome: Progressing     Problem: Pain  Goal: Verbalizes/displays adequate comfort level or baseline comfort level  Outcome: Progressing     Problem: ABCDS Injury Assessment  Goal: Absence of physical injury  Outcome: Progressing     
  Problem: Discharge Planning  Goal: Discharge to home or other facility with appropriate resources  Outcome: Progressing     Problem: Safety - Adult  Goal: Free from fall injury  Outcome: Progressing     Problem: Skin/Tissue Integrity  Goal: Absence of new skin breakdown  Description: 1.  Monitor for areas of redness and/or skin breakdown  2.  Assess vascular access sites hourly  3.  Every 4-6 hours minimum:  Change oxygen saturation probe site  4.  Every 4-6 hours:  If on nasal continuous positive airway pressure, respiratory therapy assess nares and determine need for appliance change or resting period.  Outcome: Progressing     Problem: Pain  Goal: Verbalizes/displays adequate comfort level or baseline comfort level  Outcome: Progressing     Problem: ABCDS Injury Assessment  Goal: Absence of physical injury  Outcome: Progressing     Problem: Chronic Conditions and Co-morbidities  Goal: Patient's chronic conditions and co-morbidity symptoms are monitored and maintained or improved  Outcome: Progressing     
  Problem: Discharge Planning  Goal: Discharge to home or other facility with appropriate resources  Outcome: Progressing  Flowsheets (Taken 6/27/2024 1959)  Discharge to home or other facility with appropriate resources: Identify barriers to discharge with patient and caregiver     Problem: Safety - Adult  Goal: Free from fall injury  Outcome: Progressing  Flowsheets (Taken 6/28/2024 0602)  Free From Fall Injury: Instruct family/caregiver on patient safety     Problem: Skin/Tissue Integrity  Goal: Absence of new skin breakdown  Description: 1.  Monitor for areas of redness and/or skin breakdown  2.  Assess vascular access sites hourly  3.  Every 4-6 hours minimum:  Change oxygen saturation probe site  4.  Every 4-6 hours:  If on nasal continuous positive airway pressure, respiratory therapy assess nares and determine need for appliance change or resting period.  Outcome: Progressing     Problem: Pain  Goal: Verbalizes/displays adequate comfort level or baseline comfort level  Outcome: Progressing     Problem: ABCDS Injury Assessment  Goal: Absence of physical injury  Outcome: Progressing  Flowsheets (Taken 6/28/2024 0602)  Absence of Physical Injury: Implement safety measures based on patient assessment     Problem: Chronic Conditions and Co-morbidities  Goal: Patient's chronic conditions and co-morbidity symptoms are monitored and maintained or improved  Outcome: Progressing  Flowsheets (Taken 6/27/2024 1959)  Care Plan - Patient's Chronic Conditions and Co-Morbidity Symptoms are Monitored and Maintained or Improved: Monitor and assess patient's chronic conditions and comorbid symptoms for stability, deterioration, or improvement     
  Problem: Discharge Planning  Goal: Discharge to home or other facility with appropriate resources  Outcome: Progressing  Flowsheets (Taken 6/28/2024 2109)  Discharge to home or other facility with appropriate resources: Identify barriers to discharge with patient and caregiver     Problem: Safety - Adult  Goal: Free from fall injury  Outcome: Progressing  Flowsheets (Taken 6/29/2024 0014)  Free From Fall Injury: Instruct family/caregiver on patient safety     Problem: Skin/Tissue Integrity  Goal: Absence of new skin breakdown  Description: 1.  Monitor for areas of redness and/or skin breakdown  2.  Assess vascular access sites hourly  3.  Every 4-6 hours minimum:  Change oxygen saturation probe site  4.  Every 4-6 hours:  If on nasal continuous positive airway pressure, respiratory therapy assess nares and determine need for appliance change or resting period.  Outcome: Progressing     Problem: Pain  Goal: Verbalizes/displays adequate comfort level or baseline comfort level  Outcome: Progressing     Problem: ABCDS Injury Assessment  Goal: Absence of physical injury  Outcome: Progressing  Flowsheets (Taken 6/29/2024 0014)  Absence of Physical Injury: Implement safety measures based on patient assessment     Problem: Chronic Conditions and Co-morbidities  Goal: Patient's chronic conditions and co-morbidity symptoms are monitored and maintained or improved  Outcome: Progressing  Flowsheets (Taken 6/28/2024 2109)  Care Plan - Patient's Chronic Conditions and Co-Morbidity Symptoms are Monitored and Maintained or Improved: Monitor and assess patient's chronic conditions and comorbid symptoms for stability, deterioration, or improvement     
  Problem: Safety - Adult  Goal: Free from fall injury  Outcome: Progressing     Problem: Skin/Tissue Integrity  Goal: Absence of new skin breakdown  Description: 1.  Monitor for areas of redness and/or skin breakdown  2.  Assess vascular access sites hourly  3.  Every 4-6 hours minimum:  Change oxygen saturation probe site  4.  Every 4-6 hours:  If on nasal continuous positive airway pressure, respiratory therapy assess nares and determine need for appliance change or resting period.  Outcome: Progressing     Problem: Pain  Goal: Verbalizes/displays adequate comfort level or baseline comfort level  Outcome: Progressing     Problem: ABCDS Injury Assessment  Goal: Absence of physical injury  Outcome: Progressing     Problem: Chronic Conditions and Co-morbidities  Goal: Patient's chronic conditions and co-morbidity symptoms are monitored and maintained or improved  Outcome: Not Progressing     Problem: Safety - Adult  Goal: Free from fall injury  Outcome: Progressing     Problem: Skin/Tissue Integrity  Goal: Absence of new skin breakdown  Description: 1.  Monitor for areas of redness and/or skin breakdown  2.  Assess vascular access sites hourly  3.  Every 4-6 hours minimum:  Change oxygen saturation probe site  4.  Every 4-6 hours:  If on nasal continuous positive airway pressure, respiratory therapy assess nares and determine need for appliance change or resting period.  Outcome: Progressing     Problem: Pain  Goal: Verbalizes/displays adequate comfort level or baseline comfort level  Outcome: Progressing     Problem: Pain  Goal: Verbalizes/displays adequate comfort level or baseline comfort level  Outcome: Progressing     Problem: ABCDS Injury Assessment  Goal: Absence of physical injury  Outcome: Progressing     Problem: Chronic Conditions and Co-morbidities  Goal: Patient's chronic conditions and co-morbidity symptoms are monitored and maintained or improved  Outcome: Not Progressing     Problem: Chronic 
Nutrition Problem #1: Increased nutrient needs  Intervention: Food and/or Nutrient Delivery: Continue Current Diet  Nutritional      
plan with appropriate goals if chronic or comorbid symptoms are exacerbated and prevent overall improvement and discharge   Electronically signed by Delfino Canchola RN on 6/26/2024 at 12:00 AM

## 2024-07-16 ENCOUNTER — HOSPITAL ENCOUNTER (INPATIENT)
Age: 89
LOS: 6 days | Discharge: NURSING, REHAB OR LONG-TERM CARE | DRG: 871 | End: 2024-07-22
Attending: STUDENT IN AN ORGANIZED HEALTH CARE EDUCATION/TRAINING PROGRAM | Admitting: STUDENT IN AN ORGANIZED HEALTH CARE EDUCATION/TRAINING PROGRAM
Payer: MEDICARE

## 2024-07-16 ENCOUNTER — APPOINTMENT (OUTPATIENT)
Dept: CT IMAGING | Age: 89
DRG: 871 | End: 2024-07-16
Payer: MEDICARE

## 2024-07-16 ENCOUNTER — APPOINTMENT (OUTPATIENT)
Dept: GENERAL RADIOLOGY | Age: 89
DRG: 871 | End: 2024-07-16
Payer: MEDICARE

## 2024-07-16 DIAGNOSIS — D72.829 LEUKOCYTOSIS, UNSPECIFIED TYPE: ICD-10-CM

## 2024-07-16 DIAGNOSIS — R41.82 ALTERED MENTAL STATUS, UNSPECIFIED ALTERED MENTAL STATUS TYPE: Primary | ICD-10-CM

## 2024-07-16 DIAGNOSIS — I95.9 HYPOTENSION, UNSPECIFIED HYPOTENSION TYPE: ICD-10-CM

## 2024-07-16 DIAGNOSIS — Z51.5 PALLIATIVE CARE PATIENT: ICD-10-CM

## 2024-07-16 DIAGNOSIS — A41.9 SEPTIC SHOCK (HCC): ICD-10-CM

## 2024-07-16 DIAGNOSIS — R65.21 SEPTIC SHOCK (HCC): ICD-10-CM

## 2024-07-16 PROBLEM — N17.9 AKI (ACUTE KIDNEY INJURY) (HCC): Status: ACTIVE | Noted: 2024-07-16

## 2024-07-16 LAB
ALBUMIN SERPL-MCNC: 2.1 G/DL (ref 3.5–5.2)
ALP SERPL-CCNC: 127 U/L (ref 35–104)
ALT SERPL-CCNC: 6 U/L (ref 5–33)
ANION GAP SERPL CALCULATED.3IONS-SCNC: 9 MMOL/L (ref 7–19)
AST SERPL-CCNC: 16 U/L (ref 5–32)
BASOPHILS # BLD: 0.1 K/UL (ref 0–0.2)
BASOPHILS NFR BLD: 0.2 % (ref 0–1)
BILIRUB SERPL-MCNC: 0.8 MG/DL (ref 0.2–1.2)
BILIRUB UR QL STRIP: ABNORMAL
BUN SERPL-MCNC: 38 MG/DL (ref 8–23)
CALCIUM SERPL-MCNC: 8.1 MG/DL (ref 8.2–9.6)
CHLORIDE SERPL-SCNC: 94 MMOL/L (ref 98–111)
CLARITY UR: CLEAR
CO2 SERPL-SCNC: 30 MMOL/L (ref 22–29)
COLOR UR: ABNORMAL
CREAT SERPL-MCNC: 1.6 MG/DL (ref 0.5–0.9)
EOSINOPHIL # BLD: 0 K/UL (ref 0–0.6)
EOSINOPHIL NFR BLD: 0.1 % (ref 0–5)
ERYTHROCYTE [DISTWIDTH] IN BLOOD BY AUTOMATED COUNT: 14 % (ref 11.5–14.5)
GLUCOSE SERPL-MCNC: 157 MG/DL (ref 74–109)
GLUCOSE UR STRIP.AUTO-MCNC: NEGATIVE MG/DL
HCT VFR BLD AUTO: 26.9 % (ref 37–47)
HGB BLD-MCNC: 8.2 G/DL (ref 12–16)
HGB UR STRIP.AUTO-MCNC: NEGATIVE MG/L
IMM GRANULOCYTES # BLD: 0.5 K/UL
INR PPP: 1.47 (ref 0.88–1.18)
KETONES UR STRIP.AUTO-MCNC: ABNORMAL MG/DL
LACTATE BLDV-SCNC: 1.9 MMOL/L (ref 0.5–1.9)
LACTATE BLDV-SCNC: 2.2 MG/DL (ref 0.5–1.9)
LACTATE BLDV-SCNC: 2.5 MG/DL (ref 0.5–1.9)
LEUKOCYTE ESTERASE UR QL STRIP.AUTO: NEGATIVE
LIPASE SERPL-CCNC: 8 U/L (ref 13–60)
LYMPHOCYTES # BLD: 1 K/UL (ref 1.1–4.5)
LYMPHOCYTES NFR BLD: 3.7 % (ref 20–40)
MCH RBC QN AUTO: 32.4 PG (ref 27–31)
MCHC RBC AUTO-ENTMCNC: 30.5 G/DL (ref 33–37)
MCV RBC AUTO: 106.3 FL (ref 81–99)
MONOCYTES # BLD: 1.2 K/UL (ref 0–0.9)
MONOCYTES NFR BLD: 4.3 % (ref 0–10)
NEUTROPHILS # BLD: 24.9 K/UL (ref 1.5–7.5)
NEUTS SEG NFR BLD: 90 % (ref 50–65)
NITRITE UR QL STRIP.AUTO: NEGATIVE
PH UR STRIP.AUTO: 5 [PH] (ref 5–8)
PLATELET # BLD AUTO: 285 K/UL (ref 130–400)
PMV BLD AUTO: 9.6 FL (ref 9.4–12.3)
POTASSIUM SERPL-SCNC: 4.9 MMOL/L (ref 3.5–5)
PROCALCITONIN: 0.81 NG/ML (ref 0–0.09)
PROT SERPL-MCNC: 5.6 G/DL (ref 6.6–8.7)
PROT UR STRIP.AUTO-MCNC: ABNORMAL MG/DL
PROTHROMBIN TIME: 17.4 SEC (ref 12–14.6)
RBC # BLD AUTO: 2.53 M/UL (ref 4.2–5.4)
SODIUM SERPL-SCNC: 133 MMOL/L (ref 136–145)
SP GR UR STRIP.AUTO: 1.02 (ref 1–1.03)
UROBILINOGEN UR STRIP.AUTO-MCNC: 1 E.U./DL
WBC # BLD AUTO: 27.7 K/UL (ref 4.8–10.8)

## 2024-07-16 PROCEDURE — 99285 EMERGENCY DEPT VISIT HI MDM: CPT

## 2024-07-16 PROCEDURE — 73502 X-RAY EXAM HIP UNI 2-3 VIEWS: CPT

## 2024-07-16 PROCEDURE — 83690 ASSAY OF LIPASE: CPT

## 2024-07-16 PROCEDURE — 93005 ELECTROCARDIOGRAM TRACING: CPT | Performed by: STUDENT IN AN ORGANIZED HEALTH CARE EDUCATION/TRAINING PROGRAM

## 2024-07-16 PROCEDURE — 80053 COMPREHEN METABOLIC PANEL: CPT

## 2024-07-16 PROCEDURE — 81003 URINALYSIS AUTO W/O SCOPE: CPT

## 2024-07-16 PROCEDURE — 87086 URINE CULTURE/COLONY COUNT: CPT

## 2024-07-16 PROCEDURE — 2500000003 HC RX 250 WO HCPCS: Performed by: STUDENT IN AN ORGANIZED HEALTH CARE EDUCATION/TRAINING PROGRAM

## 2024-07-16 PROCEDURE — 2580000003 HC RX 258: Performed by: STUDENT IN AN ORGANIZED HEALTH CARE EDUCATION/TRAINING PROGRAM

## 2024-07-16 PROCEDURE — 96368 THER/DIAG CONCURRENT INF: CPT

## 2024-07-16 PROCEDURE — 51702 INSERT TEMP BLADDER CATH: CPT

## 2024-07-16 PROCEDURE — 36415 COLL VENOUS BLD VENIPUNCTURE: CPT

## 2024-07-16 PROCEDURE — 74176 CT ABD & PELVIS W/O CONTRAST: CPT

## 2024-07-16 PROCEDURE — 85025 COMPLETE CBC W/AUTO DIFF WBC: CPT

## 2024-07-16 PROCEDURE — 6360000002 HC RX W HCPCS: Performed by: STUDENT IN AN ORGANIZED HEALTH CARE EDUCATION/TRAINING PROGRAM

## 2024-07-16 PROCEDURE — 2580000003 HC RX 258: Performed by: NURSE PRACTITIONER

## 2024-07-16 PROCEDURE — 83605 ASSAY OF LACTIC ACID: CPT

## 2024-07-16 PROCEDURE — 87040 BLOOD CULTURE FOR BACTERIA: CPT

## 2024-07-16 PROCEDURE — 71045 X-RAY EXAM CHEST 1 VIEW: CPT

## 2024-07-16 PROCEDURE — 70450 CT HEAD/BRAIN W/O DYE: CPT

## 2024-07-16 PROCEDURE — 85610 PROTHROMBIN TIME: CPT

## 2024-07-16 PROCEDURE — 84145 PROCALCITONIN (PCT): CPT

## 2024-07-16 PROCEDURE — 96366 THER/PROPH/DIAG IV INF ADDON: CPT

## 2024-07-16 PROCEDURE — 1200000000 HC SEMI PRIVATE

## 2024-07-16 PROCEDURE — 96365 THER/PROPH/DIAG IV INF INIT: CPT

## 2024-07-16 RX ORDER — DOCUSATE SODIUM 100 MG/1
100 CAPSULE, LIQUID FILLED ORAL 2 TIMES DAILY
COMMUNITY

## 2024-07-16 RX ORDER — 0.9 % SODIUM CHLORIDE 0.9 %
1000 INTRAVENOUS SOLUTION INTRAVENOUS ONCE
Status: COMPLETED | OUTPATIENT
Start: 2024-07-16 | End: 2024-07-16

## 2024-07-16 RX ORDER — SODIUM CHLORIDE 0.9 % (FLUSH) 0.9 %
5-40 SYRINGE (ML) INJECTION EVERY 12 HOURS SCHEDULED
Status: DISCONTINUED | OUTPATIENT
Start: 2024-07-16 | End: 2024-07-22 | Stop reason: HOSPADM

## 2024-07-16 RX ORDER — SODIUM CHLORIDE 9 MG/ML
INJECTION, SOLUTION INTRAVENOUS PRN
Status: DISCONTINUED | OUTPATIENT
Start: 2024-07-16 | End: 2024-07-22 | Stop reason: HOSPADM

## 2024-07-16 RX ORDER — ACETAMINOPHEN 325 MG/1
650 TABLET ORAL EVERY 6 HOURS PRN
Status: DISCONTINUED | OUTPATIENT
Start: 2024-07-16 | End: 2024-07-22 | Stop reason: HOSPADM

## 2024-07-16 RX ORDER — NOREPINEPHRINE BITARTRATE 0.06 MG/ML
1-100 INJECTION, SOLUTION INTRAVENOUS CONTINUOUS
Status: DISCONTINUED | OUTPATIENT
Start: 2024-07-16 | End: 2024-07-17

## 2024-07-16 RX ORDER — SODIUM CHLORIDE 0.9 % (FLUSH) 0.9 %
5-40 SYRINGE (ML) INJECTION PRN
Status: DISCONTINUED | OUTPATIENT
Start: 2024-07-16 | End: 2024-07-22 | Stop reason: HOSPADM

## 2024-07-16 RX ORDER — SODIUM CHLORIDE 9 MG/ML
INJECTION, SOLUTION INTRAVENOUS CONTINUOUS
Status: DISCONTINUED | OUTPATIENT
Start: 2024-07-16 | End: 2024-07-19

## 2024-07-16 RX ORDER — 0.9 % SODIUM CHLORIDE 0.9 %
250 INTRAVENOUS SOLUTION INTRAVENOUS ONCE
Status: COMPLETED | OUTPATIENT
Start: 2024-07-16 | End: 2024-07-16

## 2024-07-16 RX ORDER — ENOXAPARIN SODIUM 100 MG/ML
30 INJECTION SUBCUTANEOUS DAILY
Status: DISCONTINUED | OUTPATIENT
Start: 2024-07-17 | End: 2024-07-21

## 2024-07-16 RX ORDER — ACETAMINOPHEN 650 MG/1
650 SUPPOSITORY RECTAL EVERY 6 HOURS PRN
Status: DISCONTINUED | OUTPATIENT
Start: 2024-07-16 | End: 2024-07-22 | Stop reason: HOSPADM

## 2024-07-16 RX ORDER — 0.9 % SODIUM CHLORIDE 0.9 %
500 INTRAVENOUS SOLUTION INTRAVENOUS ONCE
Status: DISCONTINUED | OUTPATIENT
Start: 2024-07-16 | End: 2024-07-16

## 2024-07-16 RX ADMIN — SODIUM CHLORIDE, PRESERVATIVE FREE 10 ML: 5 INJECTION INTRAVENOUS at 21:52

## 2024-07-16 RX ADMIN — CEFEPIME 2000 MG: 2 INJECTION, POWDER, FOR SOLUTION INTRAVENOUS at 17:40

## 2024-07-16 RX ADMIN — SODIUM CHLORIDE: 9 INJECTION, SOLUTION INTRAVENOUS at 21:50

## 2024-07-16 RX ADMIN — VANCOMYCIN HYDROCHLORIDE 1250 MG: 10 INJECTION, POWDER, LYOPHILIZED, FOR SOLUTION INTRAVENOUS at 18:13

## 2024-07-16 RX ADMIN — SODIUM CHLORIDE, PRESERVATIVE FREE 10 ML: 5 INJECTION INTRAVENOUS at 21:51

## 2024-07-16 RX ADMIN — Medication 5 MCG/MIN: at 18:52

## 2024-07-16 RX ADMIN — SODIUM CHLORIDE 250 ML: 9 INJECTION, SOLUTION INTRAVENOUS at 17:37

## 2024-07-16 RX ADMIN — SODIUM CHLORIDE 1000 ML: 9 INJECTION, SOLUTION INTRAVENOUS at 17:25

## 2024-07-16 NOTE — ED PROVIDER NOTES
[facility] EMERGENCY DEPT  eMERGENCY dEPARTMENT eNCOUnter      Pt Name: Celsa Henderson  MRN: [id number]  Birthdate 5/29/1930  Date of evaluation: 7/16/2024  Provider: Silvia Mar MD    CHIEF COMPLAINT       Chief Complaint   Patient presents with    Altered Mental Status     Lethargic, LKW unknown    Hypotension     EMS called for systolic BP in the 60s, 250NS en route in EMS and pressures improved         HISTORY OF PRESENT ILLNESS   (Location/Symptom, Timing/Onset,Context/Setting, Quality, Duration, Modifying Factors, Severity)  Note limiting factors.     HPI    Celsa Henderson is a 89 y.o. female with PMH of ischemic cardiomyopathy, combined systolic and diastolic CHF, peripheral artery disease, hypertension, hyperlipidemia, AAA who presents to the emergency department with CC of altered mental status, hypotension.  Patient apparently lived independently at home until a recent fall on 6/24/2024 during which she sustained a fracture of her right hip and underwent surgery with Dr. Chan.  She was discharged to nursing facility on 7/2/2024.  EMS was called today from the facility due to altered mental status and hypotension.  Apparently patient was her usual self this morning, but over the course of the afternoon she started complaining of more right hip pain, had an early dose of her pain medication, and became more altered and confused and was noted to be hypotensive with systolic in the 50s upon their arrival.  Patient was given 250 cc bolus with EMS with some improvement in her blood pressure transiently, however upon arrival to the ED she is again hypotensive 66/41.  Patient is lethargic but is able to answer some questions appropriately.  She states that she is a DNR and repeatedly states that she has a living well.  She is complaining of pain in her right hip.  She is otherwise unable to answer any more questions.  There is no reported history of new falls or trauma, fevers, nausea, vomiting, diarrhea, or decreased

## 2024-07-16 NOTE — ED NOTES
V.O. for 250cc bolus instead of additional 500cc d/t EMS giving 250 en route to ED, total NS bolus of 1500cc per Dr. Mar

## 2024-07-17 LAB
ANION GAP SERPL CALCULATED.3IONS-SCNC: 11 MMOL/L (ref 7–19)
BUN SERPL-MCNC: 37 MG/DL (ref 8–23)
CALCIUM SERPL-MCNC: 8.1 MG/DL (ref 8.2–9.6)
CHLORIDE SERPL-SCNC: 101 MMOL/L (ref 98–111)
CO2 SERPL-SCNC: 26 MMOL/L (ref 22–29)
CREAT SERPL-MCNC: 1.3 MG/DL (ref 0.5–0.9)
EKG P AXIS: 58 DEGREES
EKG P-R INTERVAL: 200 MS
EKG Q-T INTERVAL: 412 MS
EKG QRS DURATION: 134 MS
EKG QTC CALCULATION (BAZETT): 450 MS
EKG T AXIS: 142 DEGREES
ERYTHROCYTE [DISTWIDTH] IN BLOOD BY AUTOMATED COUNT: 14.1 % (ref 11.5–14.5)
FERRITIN SERPL-MCNC: 877.5 NG/ML (ref 13–150)
FOLATE SERPL-MCNC: 8 NG/ML (ref 4.8–37.3)
GLUCOSE SERPL-MCNC: 136 MG/DL (ref 74–109)
HAPTOGLOB SERPL-MCNC: 280 MG/DL (ref 30–200)
HCT VFR BLD AUTO: 29.5 % (ref 37–47)
HCT VFR BLD AUTO: 29.8 % (ref 37–47)
HGB BLD-MCNC: 9.1 G/DL (ref 12–16)
IRON SATN MFR SERPL: 21 % (ref 14–50)
IRON SERPL-MCNC: 26 UG/DL (ref 37–145)
LDH SERPL-CCNC: 10 U/L (ref 91–215)
MCH RBC QN AUTO: 31.9 PG (ref 27–31)
MCHC RBC AUTO-ENTMCNC: 30.8 G/DL (ref 33–37)
MCV RBC AUTO: 103.5 FL (ref 81–99)
PLATELET # BLD AUTO: 386 K/UL (ref 130–400)
PMV BLD AUTO: 9.3 FL (ref 9.4–12.3)
POTASSIUM SERPL-SCNC: 5 MMOL/L (ref 3.5–5)
RBC # BLD AUTO: 2.85 M/UL (ref 4.2–5.4)
RETICS # AUTO: 0.03 M/UL (ref 0.03–0.12)
RETICS/RBC NFR: 1.15 % (ref 0.5–1.5)
SODIUM SERPL-SCNC: 138 MMOL/L (ref 136–145)
TIBC SERPL-MCNC: 125 UG/DL (ref 250–400)
VANCOMYCIN SERPL-MCNC: 10.5 UG/ML
VIT B12 SERPL-MCNC: >2000 PG/ML (ref 211–946)
WBC # BLD AUTO: 35.3 K/UL (ref 4.8–10.8)

## 2024-07-17 PROCEDURE — 6370000000 HC RX 637 (ALT 250 FOR IP): Performed by: INTERNAL MEDICINE

## 2024-07-17 PROCEDURE — 6360000002 HC RX W HCPCS: Performed by: INTERNAL MEDICINE

## 2024-07-17 PROCEDURE — 96372 THER/PROPH/DIAG INJ SC/IM: CPT

## 2024-07-17 PROCEDURE — 6370000000 HC RX 637 (ALT 250 FOR IP): Performed by: NURSE PRACTITIONER

## 2024-07-17 PROCEDURE — 83540 ASSAY OF IRON: CPT

## 2024-07-17 PROCEDURE — 36415 COLL VENOUS BLD VENIPUNCTURE: CPT

## 2024-07-17 PROCEDURE — 83010 ASSAY OF HAPTOGLOBIN QUANT: CPT

## 2024-07-17 PROCEDURE — 6360000002 HC RX W HCPCS: Performed by: NURSE PRACTITIONER

## 2024-07-17 PROCEDURE — 2580000003 HC RX 258: Performed by: STUDENT IN AN ORGANIZED HEALTH CARE EDUCATION/TRAINING PROGRAM

## 2024-07-17 PROCEDURE — 6360000002 HC RX W HCPCS: Performed by: STUDENT IN AN ORGANIZED HEALTH CARE EDUCATION/TRAINING PROGRAM

## 2024-07-17 PROCEDURE — 1200000000 HC SEMI PRIVATE

## 2024-07-17 PROCEDURE — 96366 THER/PROPH/DIAG IV INF ADDON: CPT

## 2024-07-17 PROCEDURE — 2580000003 HC RX 258: Performed by: NURSE PRACTITIONER

## 2024-07-17 PROCEDURE — 82607 VITAMIN B-12: CPT

## 2024-07-17 PROCEDURE — 83550 IRON BINDING TEST: CPT

## 2024-07-17 PROCEDURE — 82728 ASSAY OF FERRITIN: CPT

## 2024-07-17 PROCEDURE — 2580000003 HC RX 258: Performed by: INTERNAL MEDICINE

## 2024-07-17 PROCEDURE — 82746 ASSAY OF FOLIC ACID SERUM: CPT

## 2024-07-17 PROCEDURE — 80202 ASSAY OF VANCOMYCIN: CPT

## 2024-07-17 PROCEDURE — 83615 LACTATE (LD) (LDH) ENZYME: CPT

## 2024-07-17 PROCEDURE — 93010 ELECTROCARDIOGRAM REPORT: CPT | Performed by: INTERNAL MEDICINE

## 2024-07-17 PROCEDURE — 85027 COMPLETE CBC AUTOMATED: CPT

## 2024-07-17 PROCEDURE — 85045 AUTOMATED RETICULOCYTE COUNT: CPT

## 2024-07-17 PROCEDURE — 80048 BASIC METABOLIC PNL TOTAL CA: CPT

## 2024-07-17 RX ORDER — IPRATROPIUM BROMIDE AND ALBUTEROL SULFATE 2.5; .5 MG/3ML; MG/3ML
1 SOLUTION RESPIRATORY (INHALATION) EVERY 4 HOURS PRN
Status: DISCONTINUED | OUTPATIENT
Start: 2024-07-17 | End: 2024-07-22 | Stop reason: HOSPADM

## 2024-07-17 RX ORDER — UREA 10 %
1000 LOTION (ML) TOPICAL DAILY
Status: DISCONTINUED | OUTPATIENT
Start: 2024-07-17 | End: 2024-07-22 | Stop reason: HOSPADM

## 2024-07-17 RX ORDER — LACTULOSE 10 G/15ML
20 SOLUTION ORAL 3 TIMES DAILY PRN
Status: DISCONTINUED | OUTPATIENT
Start: 2024-07-17 | End: 2024-07-20

## 2024-07-17 RX ORDER — CARVEDILOL 3.12 MG/1
3.12 TABLET ORAL 2 TIMES DAILY WITH MEALS
Status: DISCONTINUED | OUTPATIENT
Start: 2024-07-17 | End: 2024-07-17

## 2024-07-17 RX ORDER — LISINOPRIL 5 MG/1
5 TABLET ORAL DAILY
Status: DISCONTINUED | OUTPATIENT
Start: 2024-07-17 | End: 2024-07-17

## 2024-07-17 RX ORDER — DOCUSATE SODIUM 100 MG/1
100 CAPSULE, LIQUID FILLED ORAL 2 TIMES DAILY
Status: DISCONTINUED | OUTPATIENT
Start: 2024-07-17 | End: 2024-07-22 | Stop reason: HOSPADM

## 2024-07-17 RX ORDER — BISMUTH TRIBROMOPH/PETROLATUM 5"X9"
1 BANDAGE TOPICAL DAILY
Status: DISCONTINUED | OUTPATIENT
Start: 2024-07-18 | End: 2024-07-22 | Stop reason: HOSPADM

## 2024-07-17 RX ORDER — SODIUM FERRIC GLUCONATE COMPLEX IN SUCROSE 12.5 MG/ML
250 INJECTION INTRAVENOUS DAILY
Status: DISCONTINUED | OUTPATIENT
Start: 2024-07-17 | End: 2024-07-17 | Stop reason: SDUPTHER

## 2024-07-17 RX ORDER — ONDANSETRON 2 MG/ML
4 INJECTION INTRAMUSCULAR; INTRAVENOUS EVERY 6 HOURS PRN
Status: DISCONTINUED | OUTPATIENT
Start: 2024-07-17 | End: 2024-07-22 | Stop reason: HOSPADM

## 2024-07-17 RX ORDER — BISMUTH TRIBROMOPH/PETROLATUM 5"X9"
1 BANDAGE TOPICAL PRN
Status: DISCONTINUED | OUTPATIENT
Start: 2024-07-17 | End: 2024-07-17

## 2024-07-17 RX ORDER — ASPIRIN 81 MG/1
81 TABLET ORAL DAILY
Status: DISCONTINUED | OUTPATIENT
Start: 2024-07-17 | End: 2024-07-22 | Stop reason: HOSPADM

## 2024-07-17 RX ORDER — PRAVASTATIN SODIUM 20 MG
40 TABLET ORAL DAILY
Status: DISCONTINUED | OUTPATIENT
Start: 2024-07-17 | End: 2024-07-17

## 2024-07-17 RX ADMIN — SODIUM CHLORIDE: 9 INJECTION, SOLUTION INTRAVENOUS at 10:21

## 2024-07-17 RX ADMIN — SODIUM CHLORIDE, PRESERVATIVE FREE 10 ML: 5 INJECTION INTRAVENOUS at 21:29

## 2024-07-17 RX ADMIN — ONDANSETRON 4 MG: 2 INJECTION INTRAMUSCULAR; INTRAVENOUS at 19:45

## 2024-07-17 RX ADMIN — SODIUM CHLORIDE, PRESERVATIVE FREE 10 ML: 5 INJECTION INTRAVENOUS at 21:30

## 2024-07-17 RX ADMIN — ACETAMINOPHEN 650 MG: 325 TABLET ORAL at 21:32

## 2024-07-17 RX ADMIN — CEFEPIME 1000 MG: 1 INJECTION, POWDER, FOR SOLUTION INTRAMUSCULAR; INTRAVENOUS at 18:16

## 2024-07-17 RX ADMIN — CYANOCOBALAMIN TAB 500 MCG 1000 MCG: 500 TAB at 08:46

## 2024-07-17 RX ADMIN — PRAVASTATIN SODIUM 40 MG: 20 TABLET ORAL at 08:46

## 2024-07-17 RX ADMIN — DOCUSATE SODIUM 100 MG: 100 CAPSULE, LIQUID FILLED ORAL at 21:22

## 2024-07-17 RX ADMIN — LACTULOSE 20 G: 20 SOLUTION ORAL at 17:42

## 2024-07-17 RX ADMIN — ENOXAPARIN SODIUM 30 MG: 100 INJECTION SUBCUTANEOUS at 08:46

## 2024-07-17 RX ADMIN — VANCOMYCIN HYDROCHLORIDE 750 MG: 750 INJECTION, POWDER, LYOPHILIZED, FOR SOLUTION INTRAVENOUS at 21:28

## 2024-07-17 RX ADMIN — Medication 1 EACH: at 10:07

## 2024-07-17 RX ADMIN — SODIUM CHLORIDE 250 MG: 9 INJECTION, SOLUTION INTRAVENOUS at 17:05

## 2024-07-17 RX ADMIN — EPOETIN ALFA-EPBX 10000 UNITS: 10000 INJECTION, SOLUTION INTRAVENOUS; SUBCUTANEOUS at 17:07

## 2024-07-17 RX ADMIN — CEFEPIME 1000 MG: 1 INJECTION, POWDER, FOR SOLUTION INTRAMUSCULAR; INTRAVENOUS at 05:49

## 2024-07-17 RX ADMIN — DOCUSATE SODIUM 100 MG: 100 CAPSULE, LIQUID FILLED ORAL at 08:46

## 2024-07-17 RX ADMIN — ASPIRIN 81 MG: 81 TABLET, COATED ORAL at 08:46

## 2024-07-17 NOTE — CARE COORDINATION
Case Management Assessment  Initial Evaluation    Date/Time of Evaluation: 7/17/2024 1:39 PM  Assessment Completed by: Ann Guzmán    If patient is discharged prior to next notation, then this note serves as note for discharge by case management.    Patient Name: Celsa Henderson                   YOB: 1930  Diagnosis: Hypotension [I95.9]                   Date / Time: 7/16/2024  5:06 PM    Patient Admission Status: Inpatient   Readmission Risk (Low < 19, Mod (19-27), High > 27): Readmission Risk Score: 21    Current PCP: Rigo Persaud MD  PCP verified by CM? Yes    Chart Reviewed: Yes      History Provided by: Other (see comment) (Pt from SNF)  Patient Orientation: Other (see comment) (Pt from SNF)    Patient Cognition: Other (see comment) (Pt from SNF)    Hospitalization in the last 30 days (Readmission):  Yes    If yes, Readmission Assessment in  Navigator will be completed.    Advance Directives:      Code Status: DNR   Patient's Primary Decision Maker is: Legal Next of Kin    Primary Decision Maker: Antoni Barajas - Other - [phone]    Secondary Decision Maker: YENI HASSAN - Niece/Nephew - [phone]    Discharge Planning:    Patient lives with: Other (Comment) (SNF) Type of Home: Skilled Nursing Facility  Primary Care Giver: Other (Comment) (SNF)  Patient Support Systems include: Home Care Staff   Current Financial resources: Medicare  Current community resources: None  Current services prior to admission: Skilled Nursing Facility            Current DME:              Type of Home Care services:  PT, OT, Nursing Services    ADLS  Prior functional level: Assistance with the following:, Mobility, Shopping, Housework, Cooking, Feeding, Toileting, Dressing, Bathing  Current functional level: Assistance with the following:, Bathing, Dressing, Toileting, Feeding, Cooking, Housework, Shopping, Mobility    PT AM-PAC:   /24  OT AM-PAC:   /24    Family can provide assistance at DC: Other

## 2024-07-17 NOTE — CARE COORDINATION
SW attempted to do Assessment and re/admit. Pt was resting at this time. SW will check back in a few hours.

## 2024-07-17 NOTE — CARE COORDINATION
07/17/24 1341   Readmission Assessment   Number of Days since last admission? 8-30 days   Previous Disposition SNF   Who is being Interviewed Patient   What was the patient's/caregiver's perception as to why they think they needed to return back to the hospital? Other (Comment)  (Sepsis)   Did you visit your Primary Care Physician after you left the hospital, before you returned this time? Yes  (SNF)   Did you see a specialist, such as Cardiac, Pulmonary, Orthopedic Physician, etc. after you left the hospital? No   Who advised the patient to return to the hospital? Other Nurse (Navigator, CTN)   Does the patient report anything that got in the way of taking their medications? No   In our efforts to provide the best possible care to you and others like you, can you think of anything that we could have done to help you after you left the hospital the first time, so that you might not have needed to return so soon? Other (Comment)  (Pt developed septic shock)

## 2024-07-17 NOTE — H&P
process. 2.  Postoperative changes of the right hip  All CT scans are performed using dose optimization techniques as appropriate to the performed exam and include at least one of the following: Automated exposure control, adjustment of the mA and/or kV according to size, and the use of iterative reconstruction technique.  ______________________________________ Electronically signed by: CLARICE CABEZAS M.D. Date:     07/16/2024 Time:    18:20     CT HEAD WO CONTRAST    Result Date: 7/16/2024  EXAM:  CT OF THE HEAD WITHOUT CONTRAST  History:  Altered mental status.  Technique:  Multiplanar CT images through the head were obtained without the administration of IV contrast  FINDINGS:  The visualized paranasal sinuses and mastoid air cells are clear in general.  No acute calvarial abnormalities.  Intracranially the ventricular and cisternal spaces are normal in size, shape and configuration for a patient of this age.  No dominant mass or midline shift.  No hydrocephalous.  No acute intracranial hemorrhage or abnormal extraaxial fluid collections.      Impression:  No acute intracranial process  All CT scans are performed using dose optimization techniques as appropriate to the performed exam and include at least one of the following: Automated exposure control, adjustment of the mA and/or kV according to size, and the use of iterative reconstruction technique.  ______________________________________ Electronically signed by: CLARICE CABEZAS M.D. Date:     07/16/2024 Time:    18:06     Is this patient to be included in the SEP-1 core measure? Yes SEP-1 CORE MEASURE DATA        Sepsis Criteria   Severe Sepsis Criteria   Septic Shock Criteria       Must meet 2:    []Temp >100.9 F (38.3 C) or < 96.8 F (36 C)  []HR > 90  []RR > 20  []WBC > 12 or < 4 or 10% bands    AND:    [] Infection Confirmed or Suspected.     Must meet 1:    []Lactate > 2       or   []Signs of Organ Dysfunction:    - SBP < 90 or MAP < 65  -Creatinine

## 2024-07-18 PROBLEM — E44.1 MILD MALNUTRITION (HCC): Status: ACTIVE | Noted: 2024-07-18

## 2024-07-18 LAB
ANION GAP SERPL CALCULATED.3IONS-SCNC: 13 MMOL/L (ref 7–19)
BACTERIA UR CULT: NORMAL
BUN SERPL-MCNC: 33 MG/DL (ref 8–23)
CALCIUM SERPL-MCNC: 7.8 MG/DL (ref 8.2–9.6)
CHLORIDE SERPL-SCNC: 104 MMOL/L (ref 98–111)
CO2 SERPL-SCNC: 22 MMOL/L (ref 22–29)
CREAT SERPL-MCNC: 1.1 MG/DL (ref 0.5–0.9)
ERYTHROCYTE [DISTWIDTH] IN BLOOD BY AUTOMATED COUNT: 14.1 % (ref 11.5–14.5)
GLUCOSE SERPL-MCNC: 101 MG/DL (ref 74–109)
HCT VFR BLD AUTO: 25.2 % (ref 37–47)
HGB BLD-MCNC: 7.5 G/DL (ref 12–16)
MCH RBC QN AUTO: 31.4 PG (ref 27–31)
MCHC RBC AUTO-ENTMCNC: 29.8 G/DL (ref 33–37)
MCV RBC AUTO: 105.4 FL (ref 81–99)
PLATELET # BLD AUTO: 285 K/UL (ref 130–400)
PMV BLD AUTO: 9.3 FL (ref 9.4–12.3)
POTASSIUM SERPL-SCNC: 4.3 MMOL/L (ref 3.5–5)
RBC # BLD AUTO: 2.39 M/UL (ref 4.2–5.4)
SODIUM SERPL-SCNC: 139 MMOL/L (ref 136–145)
VANCOMYCIN SERPL-MCNC: 9.9 UG/ML
WBC # BLD AUTO: 23.1 K/UL (ref 4.8–10.8)

## 2024-07-18 PROCEDURE — 97530 THERAPEUTIC ACTIVITIES: CPT

## 2024-07-18 PROCEDURE — 2580000003 HC RX 258: Performed by: INTERNAL MEDICINE

## 2024-07-18 PROCEDURE — 6360000002 HC RX W HCPCS: Performed by: INTERNAL MEDICINE

## 2024-07-18 PROCEDURE — 92610 EVALUATE SWALLOWING FUNCTION: CPT

## 2024-07-18 PROCEDURE — 36415 COLL VENOUS BLD VENIPUNCTURE: CPT

## 2024-07-18 PROCEDURE — 92522 EVALUATE SPEECH PRODUCTION: CPT

## 2024-07-18 PROCEDURE — 97165 OT EVAL LOW COMPLEX 30 MIN: CPT

## 2024-07-18 PROCEDURE — 51702 INSERT TEMP BLADDER CATH: CPT

## 2024-07-18 PROCEDURE — 97162 PT EVAL MOD COMPLEX 30 MIN: CPT

## 2024-07-18 PROCEDURE — 1200000000 HC SEMI PRIVATE

## 2024-07-18 PROCEDURE — 6370000000 HC RX 637 (ALT 250 FOR IP): Performed by: INTERNAL MEDICINE

## 2024-07-18 PROCEDURE — 80048 BASIC METABOLIC PNL TOTAL CA: CPT

## 2024-07-18 PROCEDURE — 94760 N-INVAS EAR/PLS OXIMETRY 1: CPT

## 2024-07-18 PROCEDURE — 85027 COMPLETE CBC AUTOMATED: CPT

## 2024-07-18 PROCEDURE — 2580000003 HC RX 258: Performed by: STUDENT IN AN ORGANIZED HEALTH CARE EDUCATION/TRAINING PROGRAM

## 2024-07-18 PROCEDURE — 2580000003 HC RX 258: Performed by: NURSE PRACTITIONER

## 2024-07-18 PROCEDURE — 80202 ASSAY OF VANCOMYCIN: CPT

## 2024-07-18 PROCEDURE — 6370000000 HC RX 637 (ALT 250 FOR IP): Performed by: NURSE PRACTITIONER

## 2024-07-18 PROCEDURE — 6360000002 HC RX W HCPCS: Performed by: NURSE PRACTITIONER

## 2024-07-18 PROCEDURE — 6360000002 HC RX W HCPCS: Performed by: STUDENT IN AN ORGANIZED HEALTH CARE EDUCATION/TRAINING PROGRAM

## 2024-07-18 RX ADMIN — ASPIRIN 81 MG: 81 TABLET, COATED ORAL at 09:23

## 2024-07-18 RX ADMIN — LACTULOSE 20 G: 20 SOLUTION ORAL at 14:13

## 2024-07-18 RX ADMIN — CEFEPIME 1000 MG: 1 INJECTION, POWDER, FOR SOLUTION INTRAMUSCULAR; INTRAVENOUS at 17:34

## 2024-07-18 RX ADMIN — Medication 1 EACH: at 09:23

## 2024-07-18 RX ADMIN — SODIUM CHLORIDE 250 MG: 9 INJECTION, SOLUTION INTRAVENOUS at 17:40

## 2024-07-18 RX ADMIN — ENOXAPARIN SODIUM 30 MG: 100 INJECTION SUBCUTANEOUS at 09:23

## 2024-07-18 RX ADMIN — DOCUSATE SODIUM 100 MG: 100 CAPSULE, LIQUID FILLED ORAL at 09:23

## 2024-07-18 RX ADMIN — CEFEPIME 1000 MG: 1 INJECTION, POWDER, FOR SOLUTION INTRAMUSCULAR; INTRAVENOUS at 05:23

## 2024-07-18 RX ADMIN — DOCUSATE SODIUM 100 MG: 100 CAPSULE, LIQUID FILLED ORAL at 20:45

## 2024-07-18 RX ADMIN — VANCOMYCIN HYDROCHLORIDE 750 MG: 750 INJECTION, POWDER, LYOPHILIZED, FOR SOLUTION INTRAVENOUS at 23:11

## 2024-07-18 RX ADMIN — CYANOCOBALAMIN TAB 500 MCG 1000 MCG: 500 TAB at 09:23

## 2024-07-18 NOTE — ACP (ADVANCE CARE PLANNING)
Advance Care Planning     Advance Care Planning Inpatient Note  [facility] Department    Today's Date: 7/18/2024  Unit: [facility]    Received request from Other: Palliative care .  Upon review of chart and communication with care team, Pt was admitted with AMS Patient was/were present in the room during visit.    Goals of ACP Conversation:  Discuss advance care planning documents    Health Care Decision Makers:       Primary Decision Maker: KarlAntoni - Other - [phone]    Secondary Decision Maker: YENI HASSAN - Niece/Nephew - [phone]  Summary:  Verified Documents    Advance Care Planning Documents (Patient Wishes):  Living Will/Advance Directive     Assessment:  Pt is a 89 year old female who recently had surgery for a broken hip. She was discharged to a SNF but was readmitted for AMS and hypotension. Pt says she is having pain in her hip. She is a DNR and does not want resuscitation. She knows she will need to go to the SNF saying \"I don't think I could make it on my own at home.\"    Interventions:  Confirmed pt's LW, confirmed decision makers, and code status.     Care Preferences Communicated:     Hospitalization:  If the patient's health worsens and it becomes clear that the chance of recovery is unlikely,     the patient wants hospitalization.    Ventilation:   If the patient, in their present state of health, suddenly became very ill and unable to breathe on their own,     the patient would NOT desire the use of a ventilator (breathing machine).    If their health worsens and it becomes clear that the change of recovery is unlikely,     the patient would NOT desire the use of a ventilator (breathing machine).    Resuscitation:  In the event the patient's heart stopped as a result of an underlying serious health condition, the patient communicates a preference for      resuscitative attempts (CPR).    Outcomes/Plan:  ACP Discussion: Completed    Electronically signed by Mary Behrens,

## 2024-07-18 NOTE — CARE COORDINATION
Precert was initiated today; plan to return to SNF tomorrow per Gibson; won't need to wait for precert decision.  Electronically signed by RENEA Willoughby on 7/18/2024 at 6:01 PM

## 2024-07-18 NOTE — PLAN OF CARE
Problem: Skin/Tissue Integrity  Goal: Absence of new skin breakdown  Description: 1.  Monitor for areas of redness and/or skin breakdown  2.  Assess vascular access sites hourly  3.  Every 4-6 hours minimum:  Change oxygen saturation probe site  4.  Every 4-6 hours:  If on nasal continuous positive airway pressure, respiratory therapy assess nares and determine need for appliance change or resting period.  7/18/2024 1151 by Russ Kwong RN  Outcome: Progressing  7/18/2024 0151 by Jade Caban RN  Outcome: Progressing     Problem: ABCDS Injury Assessment  Goal: Absence of physical injury  7/18/2024 1151 by Russ Kwong RN  Outcome: Progressing  7/18/2024 0151 by Jade Caban RN  Outcome: Progressing     Problem: Safety - Adult  Goal: Free from fall injury  7/18/2024 1151 by Russ Kwong RN  Outcome: Progressing  7/18/2024 0151 by Jade Caban RN  Outcome: Progressing     Problem: Discharge Planning  Goal: Discharge to home or other facility with appropriate resources  7/18/2024 1151 by Russ Kwong RN  Outcome: Progressing  Flowsheets (Taken 7/18/2024 1138)  Discharge to home or other facility with appropriate resources: Identify barriers to discharge with patient and caregiver  7/18/2024 0151 by Jade Caban RN  Outcome: Progressing     Problem: Pain  Goal: Verbalizes/displays adequate comfort level or baseline comfort level  7/18/2024 1151 by Russ Kwong RN  Outcome: Progressing  7/18/2024 0151 by Jade Caban RN  Outcome: Progressing     Problem: Chronic Conditions and Co-morbidities  Goal: Patient's chronic conditions and co-morbidity symptoms are monitored and maintained or improved  Outcome: Progressing  Flowsheets (Taken 7/18/2024 1138)  Care Plan - Patient's Chronic Conditions and Co-Morbidity Symptoms are Monitored and Maintained or Improved: Monitor and assess patient's chronic conditions and comorbid symptoms for stability, deterioration, or improvement

## 2024-07-18 NOTE — CARE COORDINATION
Pt will require precert initiation; once started, Pt can return to SNF on her Medicaid per Barbie;   [place]   P  [phone] F  Electronically signed by RENEA Willoughby on 7/18/2024 at 10:59 AM

## 2024-07-19 LAB
ANION GAP SERPL CALCULATED.3IONS-SCNC: 13 MMOL/L (ref 7–19)
BUN SERPL-MCNC: 27 MG/DL (ref 8–23)
CALCIUM SERPL-MCNC: 7.9 MG/DL (ref 8.2–9.6)
CHLORIDE SERPL-SCNC: 107 MMOL/L (ref 98–111)
CO2 SERPL-SCNC: 20 MMOL/L (ref 22–29)
CREAT SERPL-MCNC: 0.9 MG/DL (ref 0.5–0.9)
ERYTHROCYTE [DISTWIDTH] IN BLOOD BY AUTOMATED COUNT: 14.6 % (ref 11.5–14.5)
GLUCOSE SERPL-MCNC: 95 MG/DL (ref 74–109)
HCT VFR BLD AUTO: 25.9 % (ref 37–47)
HGB BLD-MCNC: 8 G/DL (ref 12–16)
MCH RBC QN AUTO: 32.7 PG (ref 27–31)
MCHC RBC AUTO-ENTMCNC: 30.9 G/DL (ref 33–37)
MCV RBC AUTO: 105.7 FL (ref 81–99)
PLATELET # BLD AUTO: 344 K/UL (ref 130–400)
PMV BLD AUTO: 9.6 FL (ref 9.4–12.3)
POTASSIUM SERPL-SCNC: 4.4 MMOL/L (ref 3.5–5)
RBC # BLD AUTO: 2.45 M/UL (ref 4.2–5.4)
SODIUM SERPL-SCNC: 140 MMOL/L (ref 136–145)
WBC # BLD AUTO: 21.4 K/UL (ref 4.8–10.8)

## 2024-07-19 PROCEDURE — 85027 COMPLETE CBC AUTOMATED: CPT

## 2024-07-19 PROCEDURE — 36415 COLL VENOUS BLD VENIPUNCTURE: CPT

## 2024-07-19 PROCEDURE — 51702 INSERT TEMP BLADDER CATH: CPT

## 2024-07-19 PROCEDURE — 6370000000 HC RX 637 (ALT 250 FOR IP): Performed by: INTERNAL MEDICINE

## 2024-07-19 PROCEDURE — 6370000000 HC RX 637 (ALT 250 FOR IP): Performed by: NURSE PRACTITIONER

## 2024-07-19 PROCEDURE — 6370000000 HC RX 637 (ALT 250 FOR IP): Performed by: HOSPITALIST

## 2024-07-19 PROCEDURE — 97110 THERAPEUTIC EXERCISES: CPT

## 2024-07-19 PROCEDURE — 97530 THERAPEUTIC ACTIVITIES: CPT

## 2024-07-19 PROCEDURE — 6360000002 HC RX W HCPCS: Performed by: HOSPITALIST

## 2024-07-19 PROCEDURE — 6360000002 HC RX W HCPCS: Performed by: INTERNAL MEDICINE

## 2024-07-19 PROCEDURE — 6360000002 HC RX W HCPCS: Performed by: STUDENT IN AN ORGANIZED HEALTH CARE EDUCATION/TRAINING PROGRAM

## 2024-07-19 PROCEDURE — 2580000003 HC RX 258: Performed by: NURSE PRACTITIONER

## 2024-07-19 PROCEDURE — 94760 N-INVAS EAR/PLS OXIMETRY 1: CPT

## 2024-07-19 PROCEDURE — 2580000003 HC RX 258: Performed by: INTERNAL MEDICINE

## 2024-07-19 PROCEDURE — 1200000000 HC SEMI PRIVATE

## 2024-07-19 PROCEDURE — 80048 BASIC METABOLIC PNL TOTAL CA: CPT

## 2024-07-19 PROCEDURE — 6360000002 HC RX W HCPCS: Performed by: NURSE PRACTITIONER

## 2024-07-19 PROCEDURE — 2580000003 HC RX 258: Performed by: STUDENT IN AN ORGANIZED HEALTH CARE EDUCATION/TRAINING PROGRAM

## 2024-07-19 RX ORDER — CARVEDILOL 3.12 MG/1
3.12 TABLET ORAL 2 TIMES DAILY WITH MEALS
Status: DISCONTINUED | OUTPATIENT
Start: 2024-07-19 | End: 2024-07-22 | Stop reason: HOSPADM

## 2024-07-19 RX ORDER — BUMETANIDE 0.25 MG/ML
1 INJECTION INTRAMUSCULAR; INTRAVENOUS ONCE
Status: COMPLETED | OUTPATIENT
Start: 2024-07-19 | End: 2024-07-19

## 2024-07-19 RX ADMIN — ASPIRIN 81 MG: 81 TABLET, COATED ORAL at 10:09

## 2024-07-19 RX ADMIN — BUMETANIDE 1 MG: 0.25 INJECTION INTRAMUSCULAR; INTRAVENOUS at 11:52

## 2024-07-19 RX ADMIN — ONDANSETRON 4 MG: 2 INJECTION INTRAMUSCULAR; INTRAVENOUS at 05:40

## 2024-07-19 RX ADMIN — CARVEDILOL 3.12 MG: 3.12 TABLET, FILM COATED ORAL at 18:08

## 2024-07-19 RX ADMIN — CEFEPIME 1000 MG: 1 INJECTION, POWDER, FOR SOLUTION INTRAMUSCULAR; INTRAVENOUS at 05:38

## 2024-07-19 RX ADMIN — CEFEPIME 2000 MG: 2 INJECTION, POWDER, FOR SOLUTION INTRAVENOUS at 18:10

## 2024-07-19 RX ADMIN — SODIUM CHLORIDE 250 MG: 9 INJECTION, SOLUTION INTRAVENOUS at 18:09

## 2024-07-19 RX ADMIN — SODIUM CHLORIDE: 9 INJECTION, SOLUTION INTRAVENOUS at 06:34

## 2024-07-19 RX ADMIN — ENOXAPARIN SODIUM 30 MG: 100 INJECTION SUBCUTANEOUS at 10:09

## 2024-07-19 RX ADMIN — DOCUSATE SODIUM 100 MG: 100 CAPSULE, LIQUID FILLED ORAL at 20:10

## 2024-07-19 RX ADMIN — DOCUSATE SODIUM 100 MG: 100 CAPSULE, LIQUID FILLED ORAL at 10:09

## 2024-07-19 RX ADMIN — Medication 1 EACH: at 10:10

## 2024-07-19 RX ADMIN — ACETAMINOPHEN 650 MG: 325 TABLET ORAL at 20:11

## 2024-07-19 NOTE — PLAN OF CARE
Problem: Skin/Tissue Integrity  Goal: Absence of new skin breakdown  Description: 1.  Monitor for areas of redness and/or skin breakdown  2.  Assess vascular access sites hourly  3.  Every 4-6 hours minimum:  Change oxygen saturation probe site  4.  Every 4-6 hours:  If on nasal continuous positive airway pressure, respiratory therapy assess nares and determine need for appliance change or resting period.  7/19/2024 0100 by Bettye He LPN  Outcome: Progressing  7/18/2024 1151 by Russ Kwong RN  Outcome: Progressing     Problem: ABCDS Injury Assessment  Goal: Absence of physical injury  7/19/2024 0100 by Bettye He LPN  Outcome: Progressing  Flowsheets (Taken 7/19/2024 0059)  Absence of Physical Injury: Implement safety measures based on patient assessment  7/18/2024 1151 by Russ Kwong RN  Outcome: Progressing     Problem: Safety - Adult  Goal: Free from fall injury  7/19/2024 0100 by Bettye He LPN  Outcome: Progressing  Flowsheets (Taken 7/19/2024 0059)  Free From Fall Injury: Instruct family/caregiver on patient safety  7/18/2024 1151 by Russ Kwong RN  Outcome: Progressing     Problem: Discharge Planning  Goal: Discharge to home or other facility with appropriate resources  7/19/2024 0100 by Bettye He LPN  Outcome: Progressing  7/18/2024 1151 by Russ Kwong RN  Outcome: Progressing  Flowsheets (Taken 7/18/2024 1138)  Discharge to home or other facility with appropriate resources: Identify barriers to discharge with patient and caregiver     Problem: Pain  Goal: Verbalizes/displays adequate comfort level or baseline comfort level  7/19/2024 0100 by Bettye He LPN  Outcome: Progressing  7/18/2024 1151 by Russ Kwong RN  Outcome: Progressing     Problem: Chronic Conditions and Co-morbidities  Goal: Patient's chronic conditions and co-morbidity symptoms are monitored and maintained or improved  7/19/2024 0100 by

## 2024-07-19 NOTE — CARE COORDINATION
Pt can return to [facility] when medically stable  [facility]   P  [phone] F  Electronically signed by RENEA Willoughby on 7/19/2024 at 6:18 PM

## 2024-07-20 ENCOUNTER — APPOINTMENT (OUTPATIENT)
Dept: GENERAL RADIOLOGY | Age: 89
DRG: 871 | End: 2024-07-20
Payer: MEDICARE

## 2024-07-20 LAB
ANION GAP SERPL CALCULATED.3IONS-SCNC: 12 MMOL/L (ref 7–19)
BUN SERPL-MCNC: 25 MG/DL (ref 8–23)
CALCIUM SERPL-MCNC: 8.2 MG/DL (ref 8.2–9.6)
CHLORIDE SERPL-SCNC: 104 MMOL/L (ref 98–111)
CO2 SERPL-SCNC: 19 MMOL/L (ref 22–29)
CREAT SERPL-MCNC: 0.9 MG/DL (ref 0.5–0.9)
ERYTHROCYTE [DISTWIDTH] IN BLOOD BY AUTOMATED COUNT: 14.6 % (ref 11.5–14.5)
GLUCOSE SERPL-MCNC: 99 MG/DL (ref 74–109)
HCT VFR BLD AUTO: 24.8 % (ref 37–47)
HGB BLD-MCNC: 7.3 G/DL (ref 12–16)
MCH RBC QN AUTO: 32.2 PG (ref 27–31)
MCHC RBC AUTO-ENTMCNC: 29.4 G/DL (ref 33–37)
MCV RBC AUTO: 109.3 FL (ref 81–99)
PLATELET # BLD AUTO: 331 K/UL (ref 130–400)
PMV BLD AUTO: 9.2 FL (ref 9.4–12.3)
POTASSIUM SERPL-SCNC: 3.9 MMOL/L (ref 3.5–5)
RBC # BLD AUTO: 2.27 M/UL (ref 4.2–5.4)
SODIUM SERPL-SCNC: 135 MMOL/L (ref 136–145)
WBC # BLD AUTO: 18.7 K/UL (ref 4.8–10.8)

## 2024-07-20 PROCEDURE — 85027 COMPLETE CBC AUTOMATED: CPT

## 2024-07-20 PROCEDURE — 6370000000 HC RX 637 (ALT 250 FOR IP): Performed by: INTERNAL MEDICINE

## 2024-07-20 PROCEDURE — 1200000000 HC SEMI PRIVATE

## 2024-07-20 PROCEDURE — 74018 RADEX ABDOMEN 1 VIEW: CPT

## 2024-07-20 PROCEDURE — 6370000000 HC RX 637 (ALT 250 FOR IP): Performed by: HOSPITALIST

## 2024-07-20 PROCEDURE — 6360000002 HC RX W HCPCS: Performed by: STUDENT IN AN ORGANIZED HEALTH CARE EDUCATION/TRAINING PROGRAM

## 2024-07-20 PROCEDURE — 36415 COLL VENOUS BLD VENIPUNCTURE: CPT

## 2024-07-20 PROCEDURE — 80048 BASIC METABOLIC PNL TOTAL CA: CPT

## 2024-07-20 PROCEDURE — 2580000003 HC RX 258: Performed by: STUDENT IN AN ORGANIZED HEALTH CARE EDUCATION/TRAINING PROGRAM

## 2024-07-20 PROCEDURE — 6370000000 HC RX 637 (ALT 250 FOR IP): Performed by: NURSE PRACTITIONER

## 2024-07-20 PROCEDURE — 2580000003 HC RX 258: Performed by: NURSE PRACTITIONER

## 2024-07-20 PROCEDURE — 6360000002 HC RX W HCPCS: Performed by: NURSE PRACTITIONER

## 2024-07-20 PROCEDURE — 6360000002 HC RX W HCPCS: Performed by: INTERNAL MEDICINE

## 2024-07-20 PROCEDURE — 97110 THERAPEUTIC EXERCISES: CPT

## 2024-07-20 PROCEDURE — 97530 THERAPEUTIC ACTIVITIES: CPT

## 2024-07-20 PROCEDURE — 2580000003 HC RX 258: Performed by: INTERNAL MEDICINE

## 2024-07-20 PROCEDURE — 94760 N-INVAS EAR/PLS OXIMETRY 1: CPT

## 2024-07-20 RX ORDER — LACTULOSE 10 G/15ML
20 SOLUTION ORAL 2 TIMES DAILY PRN
Status: DISCONTINUED | OUTPATIENT
Start: 2024-07-20 | End: 2024-07-22 | Stop reason: HOSPADM

## 2024-07-20 RX ORDER — HYDROCODONE BITARTRATE AND ACETAMINOPHEN 5; 325 MG/1; MG/1
1 TABLET ORAL EVERY 8 HOURS PRN
Status: DISCONTINUED | OUTPATIENT
Start: 2024-07-20 | End: 2024-07-22 | Stop reason: HOSPADM

## 2024-07-20 RX ORDER — LACTULOSE 10 G/15ML
20 SOLUTION ORAL DAILY
Status: DISCONTINUED | OUTPATIENT
Start: 2024-07-20 | End: 2024-07-20

## 2024-07-20 RX ADMIN — CARVEDILOL 3.12 MG: 3.12 TABLET, FILM COATED ORAL at 17:36

## 2024-07-20 RX ADMIN — SODIUM CHLORIDE, PRESERVATIVE FREE 10 ML: 5 INJECTION INTRAVENOUS at 20:12

## 2024-07-20 RX ADMIN — ACETAMINOPHEN 650 MG: 325 TABLET ORAL at 14:23

## 2024-07-20 RX ADMIN — HYDROCODONE BITARTRATE AND ACETAMINOPHEN 1 TABLET: 5; 325 TABLET ORAL at 17:36

## 2024-07-20 RX ADMIN — DOCUSATE SODIUM 100 MG: 100 CAPSULE, LIQUID FILLED ORAL at 08:32

## 2024-07-20 RX ADMIN — CARVEDILOL 3.12 MG: 3.12 TABLET, FILM COATED ORAL at 08:33

## 2024-07-20 RX ADMIN — SODIUM CHLORIDE, PRESERVATIVE FREE 10 ML: 5 INJECTION INTRAVENOUS at 08:33

## 2024-07-20 RX ADMIN — ONDANSETRON 4 MG: 2 INJECTION INTRAMUSCULAR; INTRAVENOUS at 16:51

## 2024-07-20 RX ADMIN — Medication 1 EACH: at 08:33

## 2024-07-20 RX ADMIN — CEFEPIME 2000 MG: 2 INJECTION, POWDER, FOR SOLUTION INTRAVENOUS at 17:43

## 2024-07-20 RX ADMIN — ASPIRIN 81 MG: 81 TABLET, COATED ORAL at 08:33

## 2024-07-20 RX ADMIN — ENOXAPARIN SODIUM 30 MG: 100 INJECTION SUBCUTANEOUS at 08:33

## 2024-07-20 RX ADMIN — CYANOCOBALAMIN TAB 500 MCG 1000 MCG: 500 TAB at 08:32

## 2024-07-20 RX ADMIN — DOCUSATE SODIUM 100 MG: 100 CAPSULE, LIQUID FILLED ORAL at 20:12

## 2024-07-20 RX ADMIN — SODIUM CHLORIDE 250 MG: 9 INJECTION, SOLUTION INTRAVENOUS at 17:42

## 2024-07-20 RX ADMIN — CEFEPIME 2000 MG: 2 INJECTION, POWDER, FOR SOLUTION INTRAVENOUS at 06:01

## 2024-07-20 NOTE — PLAN OF CARE
Problem: Skin/Tissue Integrity  Goal: Absence of new skin breakdown  Description: 1.  Monitor for areas of redness and/or skin breakdown  2.  Assess vascular access sites hourly  3.  Every 4-6 hours minimum:  Change oxygen saturation probe site  4.  Every 4-6 hours:  If on nasal continuous positive airway pressure, respiratory therapy assess nares and determine need for appliance change or resting period.  Outcome: Progressing     Problem: ABCDS Injury Assessment  Goal: Absence of physical injury  Outcome: Progressing     Problem: Safety - Adult  Goal: Free from fall injury  Outcome: Progressing     Problem: Discharge Planning  Goal: Discharge to home or other facility with appropriate resources  Outcome: Progressing     Problem: Pain  Goal: Verbalizes/displays adequate comfort level or baseline comfort level  Outcome: Progressing     Problem: Chronic Conditions and Co-morbidities  Goal: Patient's chronic conditions and co-morbidity symptoms are monitored and maintained or improved  Outcome: Progressing     Problem: Nutrition Deficit:  Goal: Optimize nutritional status  Outcome: Progressing

## 2024-07-20 NOTE — CARE COORDINATION
07/20/24 1200   IMM Letter   IMM Letter given to Patient/Family/Significant other/Guardian/POA/by: Ann Guzmán called Radha Leon for consent.   IMM Letter date given: 07/20/24   IMM Letter time given: 0945     Important Message from Medicare letter given to  Celsa Henderson  by Ann Guzmán. All questions answered,  Celsa Henderson  voiced understanding. Signed copy of IMM placed in patient's soft chart. Celsa Henderson given a copy of the IMM.     SW got verbal consent from Radha MCGINNIS

## 2024-07-20 NOTE — PLAN OF CARE
Problem: Skin/Tissue Integrity  Goal: Absence of new skin breakdown  Description: 1.  Monitor for areas of redness and/or skin breakdown  2.  Assess vascular access sites hourly  3.  Every 4-6 hours minimum:  Change oxygen saturation probe site  4.  Every 4-6 hours:  If on nasal continuous positive airway pressure, respiratory therapy assess nares and determine need for appliance change or resting period.  7/20/2024 0220 by Bettye He LPN  Outcome: Progressing  7/19/2024 1958 by Bailee Parra RN  Outcome: Progressing     Problem: ABCDS Injury Assessment  Goal: Absence of physical injury  7/20/2024 0220 by Bettye He LPN  Outcome: Progressing  Flowsheets (Taken 7/20/2024 0218)  Absence of Physical Injury: Implement safety measures based on patient assessment  7/19/2024 1958 by Bailee Parra RN  Outcome: Progressing     Problem: Safety - Adult  Goal: Free from fall injury  7/20/2024 0220 by Bettye He LPN  Outcome: Progressing  Flowsheets (Taken 7/20/2024 0218)  Free From Fall Injury: Instruct family/caregiver on patient safety  7/19/2024 1958 by Bailee Parra RN  Outcome: Progressing     Problem: Discharge Planning  Goal: Discharge to home or other facility with appropriate resources  7/20/2024 0220 by Bettye He LPN  Outcome: Progressing  Flowsheets (Taken 7/19/2024 2000)  Discharge to home or other facility with appropriate resources: Identify barriers to discharge with patient and caregiver  7/19/2024 1958 by Bailee Parra RN  Outcome: Progressing     Problem: Pain  Goal: Verbalizes/displays adequate comfort level or baseline comfort level  7/20/2024 0220 by Bettye He LPN  Outcome: Progressing  7/19/2024 1958 by Bailee Parra RN  Outcome: Progressing     Problem: Chronic Conditions and Co-morbidities  Goal: Patient's chronic conditions and co-morbidity symptoms are monitored and maintained or improved  7/20/2024 0220 by Bettye He

## 2024-07-21 LAB
BACTERIA BLD CULT ORG #2: NORMAL
BACTERIA BLD CULT: NORMAL
ERYTHROCYTE [DISTWIDTH] IN BLOOD BY AUTOMATED COUNT: 14.9 % (ref 11.5–14.5)
HCT VFR BLD AUTO: 30.4 % (ref 37–47)
HGB BLD-MCNC: 8.4 G/DL (ref 12–16)
MCH RBC QN AUTO: 33.2 PG (ref 27–31)
MCHC RBC AUTO-ENTMCNC: 27.6 G/DL (ref 33–37)
MCV RBC AUTO: 120.2 FL (ref 81–99)
PLATELET # BLD AUTO: 336 K/UL (ref 130–400)
PMV BLD AUTO: 9.2 FL (ref 9.4–12.3)
RBC # BLD AUTO: 2.53 M/UL (ref 4.2–5.4)
WBC # BLD AUTO: 21.3 K/UL (ref 4.8–10.8)

## 2024-07-21 PROCEDURE — 2580000003 HC RX 258: Performed by: NURSE PRACTITIONER

## 2024-07-21 PROCEDURE — 85027 COMPLETE CBC AUTOMATED: CPT

## 2024-07-21 PROCEDURE — 6370000000 HC RX 637 (ALT 250 FOR IP): Performed by: INTERNAL MEDICINE

## 2024-07-21 PROCEDURE — 6360000002 HC RX W HCPCS: Performed by: NURSE PRACTITIONER

## 2024-07-21 PROCEDURE — 1200000000 HC SEMI PRIVATE

## 2024-07-21 PROCEDURE — 6370000000 HC RX 637 (ALT 250 FOR IP): Performed by: NURSE PRACTITIONER

## 2024-07-21 PROCEDURE — 2580000003 HC RX 258: Performed by: STUDENT IN AN ORGANIZED HEALTH CARE EDUCATION/TRAINING PROGRAM

## 2024-07-21 PROCEDURE — 94760 N-INVAS EAR/PLS OXIMETRY 1: CPT

## 2024-07-21 PROCEDURE — 6370000000 HC RX 637 (ALT 250 FOR IP): Performed by: HOSPITALIST

## 2024-07-21 PROCEDURE — 6360000002 HC RX W HCPCS: Performed by: STUDENT IN AN ORGANIZED HEALTH CARE EDUCATION/TRAINING PROGRAM

## 2024-07-21 PROCEDURE — 36415 COLL VENOUS BLD VENIPUNCTURE: CPT

## 2024-07-21 RX ORDER — ENOXAPARIN SODIUM 100 MG/ML
40 INJECTION SUBCUTANEOUS DAILY
Status: DISCONTINUED | OUTPATIENT
Start: 2024-07-22 | End: 2024-07-22 | Stop reason: HOSPADM

## 2024-07-21 RX ADMIN — HYDROCODONE BITARTRATE AND ACETAMINOPHEN 1 TABLET: 5; 325 TABLET ORAL at 02:49

## 2024-07-21 RX ADMIN — CARVEDILOL 3.12 MG: 3.12 TABLET, FILM COATED ORAL at 09:06

## 2024-07-21 RX ADMIN — SODIUM CHLORIDE, PRESERVATIVE FREE 10 ML: 5 INJECTION INTRAVENOUS at 20:36

## 2024-07-21 RX ADMIN — ENOXAPARIN SODIUM 30 MG: 100 INJECTION SUBCUTANEOUS at 09:06

## 2024-07-21 RX ADMIN — DOCUSATE SODIUM 100 MG: 100 CAPSULE, LIQUID FILLED ORAL at 20:36

## 2024-07-21 RX ADMIN — ASPIRIN 81 MG: 81 TABLET, COATED ORAL at 09:06

## 2024-07-21 RX ADMIN — SODIUM CHLORIDE, PRESERVATIVE FREE 10 ML: 5 INJECTION INTRAVENOUS at 09:12

## 2024-07-21 RX ADMIN — DOCUSATE SODIUM 100 MG: 100 CAPSULE, LIQUID FILLED ORAL at 09:06

## 2024-07-21 RX ADMIN — Medication 1 EACH: at 09:07

## 2024-07-21 RX ADMIN — HYDROCODONE BITARTRATE AND ACETAMINOPHEN 1 TABLET: 5; 325 TABLET ORAL at 14:47

## 2024-07-21 RX ADMIN — CYANOCOBALAMIN TAB 500 MCG 1000 MCG: 500 TAB at 09:06

## 2024-07-21 RX ADMIN — CEFEPIME 2000 MG: 2 INJECTION, POWDER, FOR SOLUTION INTRAVENOUS at 17:05

## 2024-07-21 RX ADMIN — CARVEDILOL 3.12 MG: 3.12 TABLET, FILM COATED ORAL at 17:03

## 2024-07-21 RX ADMIN — CEFEPIME 2000 MG: 2 INJECTION, POWDER, FOR SOLUTION INTRAVENOUS at 06:03

## 2024-07-22 VITALS
SYSTOLIC BLOOD PRESSURE: 94 MMHG | OXYGEN SATURATION: 96 % | DIASTOLIC BLOOD PRESSURE: 58 MMHG | WEIGHT: 139.31 LBS | BODY MASS INDEX: 27.35 KG/M2 | RESPIRATION RATE: 18 BRPM | TEMPERATURE: 97 F | HEART RATE: 103 BPM | HEIGHT: 60 IN

## 2024-07-22 LAB
ERYTHROCYTE [DISTWIDTH] IN BLOOD BY AUTOMATED COUNT: 15 % (ref 11.5–14.5)
HCT VFR BLD AUTO: 26.9 % (ref 37–47)
HGB BLD-MCNC: 7.9 G/DL (ref 12–16)
MCH RBC QN AUTO: 31.7 PG (ref 27–31)
MCHC RBC AUTO-ENTMCNC: 29.4 G/DL (ref 33–37)
MCV RBC AUTO: 108 FL (ref 81–99)
PLATELET # BLD AUTO: 333 K/UL (ref 130–400)
PMV BLD AUTO: 9.1 FL (ref 9.4–12.3)
RBC # BLD AUTO: 2.49 M/UL (ref 4.2–5.4)
WBC # BLD AUTO: 19.1 K/UL (ref 4.8–10.8)

## 2024-07-22 PROCEDURE — 6370000000 HC RX 637 (ALT 250 FOR IP): Performed by: HOSPITALIST

## 2024-07-22 PROCEDURE — 6360000002 HC RX W HCPCS: Performed by: STUDENT IN AN ORGANIZED HEALTH CARE EDUCATION/TRAINING PROGRAM

## 2024-07-22 PROCEDURE — 6360000002 HC RX W HCPCS: Performed by: NURSE PRACTITIONER

## 2024-07-22 PROCEDURE — 2700000000 HC OXYGEN THERAPY PER DAY

## 2024-07-22 PROCEDURE — 85027 COMPLETE CBC AUTOMATED: CPT

## 2024-07-22 PROCEDURE — 36415 COLL VENOUS BLD VENIPUNCTURE: CPT

## 2024-07-22 PROCEDURE — 6370000000 HC RX 637 (ALT 250 FOR IP): Performed by: INTERNAL MEDICINE

## 2024-07-22 PROCEDURE — 2580000003 HC RX 258: Performed by: STUDENT IN AN ORGANIZED HEALTH CARE EDUCATION/TRAINING PROGRAM

## 2024-07-22 PROCEDURE — 6370000000 HC RX 637 (ALT 250 FOR IP): Performed by: NURSE PRACTITIONER

## 2024-07-22 PROCEDURE — 94760 N-INVAS EAR/PLS OXIMETRY 1: CPT

## 2024-07-22 RX ORDER — HYDROCODONE BITARTRATE AND ACETAMINOPHEN 5; 325 MG/1; MG/1
1 TABLET ORAL EVERY 8 HOURS PRN
Qty: 9 TABLET | Refills: 0 | Status: SHIPPED | OUTPATIENT
Start: 2024-07-22 | End: 2024-07-25

## 2024-07-22 RX ORDER — MIDODRINE HYDROCHLORIDE 5 MG/1
5 TABLET ORAL
Status: DISCONTINUED | OUTPATIENT
Start: 2024-07-22 | End: 2024-07-22 | Stop reason: HOSPADM

## 2024-07-22 RX ORDER — FUROSEMIDE 20 MG/1
20 TABLET ORAL DAILY PRN
Qty: 60 TABLET | Refills: 3 | Status: SHIPPED | OUTPATIENT
Start: 2024-07-22

## 2024-07-22 RX ORDER — MIDODRINE HYDROCHLORIDE 5 MG/1
5 TABLET ORAL
Qty: 90 TABLET | Refills: 3 | Status: SHIPPED | OUTPATIENT
Start: 2024-07-22

## 2024-07-22 RX ADMIN — Medication 1 EACH: at 09:36

## 2024-07-22 RX ADMIN — ENOXAPARIN SODIUM 40 MG: 100 INJECTION SUBCUTANEOUS at 09:34

## 2024-07-22 RX ADMIN — ASPIRIN 81 MG: 81 TABLET, COATED ORAL at 10:15

## 2024-07-22 RX ADMIN — MIDODRINE HYDROCHLORIDE 5 MG: 5 TABLET ORAL at 12:43

## 2024-07-22 RX ADMIN — CARVEDILOL 3.12 MG: 3.12 TABLET, FILM COATED ORAL at 09:34

## 2024-07-22 RX ADMIN — CEFEPIME 2000 MG: 2 INJECTION, POWDER, FOR SOLUTION INTRAVENOUS at 06:07

## 2024-07-22 RX ADMIN — CYANOCOBALAMIN TAB 500 MCG 1000 MCG: 500 TAB at 09:34

## 2024-07-22 RX ADMIN — DOCUSATE SODIUM 100 MG: 100 CAPSULE, LIQUID FILLED ORAL at 09:34

## 2024-07-22 RX ADMIN — ACETAMINOPHEN 650 MG: 325 TABLET ORAL at 01:13

## 2024-07-22 RX ADMIN — MIDODRINE HYDROCHLORIDE 5 MG: 5 TABLET ORAL at 09:33

## 2024-07-22 NOTE — PROGRESS NOTES
Automatic Dose Adjustment of                Subcutaneous Anticoagulant for Prophylaxis    Celsa Henderson is a 89 y.o. female.     Recent Labs     07/19/24  0358 07/20/24  1042   CREATININE 0.9 0.9       Estimated Creatinine Clearance: 32 mL/min (based on SCr of 0.9 mg/dL).    Weight:  Wt Readings from Last 1 Encounters:   07/20/24 63.2 kg (139 lb 5 oz)           Pharmacy has adjusted subcutaneous anticoagulant for prophylaxis to Enoxaparin 40 mg SC daily based on the patient's weight and estimated CrCl per [facility] policy.             Electronically signed by Chika Paz RPh, BCPS, 7/21/2024,1:40 PM    
    DIS Nurse Note  SUBJECTIVE: Patient assessed secondary to elevated Deterioration Index Score.      Deterioration Index Score:  Predictive Model Details          57 (Warning)  Factor Value    Calculated 7/22/2024 12:36 25% Age 89 years old    Deterioration Index Model 19% Supplemental oxygen Nasal cannula     16% Neurological exam X     12% Eddie coma scale 14     8% Systolic 97     6% WBC count abnormal (19.1 K/uL)     5% Hematocrit abnormal (26.9 %)     4% Respiratory rate 18     2% Sodium abnormal (135 mmol/L)     2% BUN abnormal (25 mg/dL)     1% Pulse 92     0% Temperature 97 °F (36.1 °C)     0% Pulse oximetry 96 %     0% Potassium 3.9 mmol/L        Vital Signs:  Vitals:    07/21/24 2330 07/22/24 0507 07/22/24 0730 07/22/24 1141   BP: 108/63 113/63 (!) 97/55    Pulse: 77 83 92    Resp: 16 18 18    Temp: 98.6 °F (37 °C) 97.5 °F (36.4 °C) 97 °F (36.1 °C)    TempSrc: Oral  Oral    SpO2: 99% 96% 96% 96%   Weight:       Height:            Provider Notified: Reviewed patient status  & DIS score with Provider Omorodion    ASSESSMENT:   Md added midodrine for blood pressure support. No other orders at this time.     Plan of Care: Patient being transferred back to her facility on midodrine    Electronically signed by Ely Morales RN   
    DIS Nurse Note  SUBJECTIVE: Patient assessed secondary to elevated Deterioration Index Score.      Deterioration Index Score:  Predictive Model Details          64 (Warning)  Factor Value    Calculated 7/21/2024 01:12 22% Age 89 years old    Deterioration Index Model 20% Respiratory rate 26     17% Supplemental oxygen Nasal cannula     14% Neurological exam X     10% Ogden coma scale 14     6% Hematocrit abnormal (24.8 %)     6% WBC count abnormal (18.7 K/uL)     2% Sodium abnormal (135 mmol/L)     1% BUN abnormal (25 mg/dL)     0% Pulse 85     0% Systolic 115     0% Pulse oximetry 95 %     0% Temperature 98.2 °F (36.8 °C)     0% Potassium 3.9 mmol/L        Vital Signs:  Vitals:    07/20/24 0715 07/20/24 0752 07/20/24 1622 07/20/24 2036   BP:  118/89 (!) 103/54 115/68   Pulse:  99 88 85   Resp:  22  26   Temp:  97.2 °F (36.2 °C) 98.8 °F (37.1 °C) 98.2 °F (36.8 °C)   TempSrc:    Oral   SpO2: 97% 95% 97% 95%   Weight:       Height:            Provider Notified: Reviewed patient status  & DIS score with Provider Dr. Chew    ASSESSMENT:   RR 26; using accessory muscles to breathe    Plan of Care: put 2L O2 via NC to decrease respiratory effort    Electronically signed by Shaunna Valenzuela RN    
    DIS Nurse Note  SUBJECTIVE: Patient assessed secondary to transfer from critical care.      Deterioration Index Score:  Predictive Model Details          65 (Warning)  Factor Value    Calculated 7/17/2024 14:49 22% Respiratory rate 10    Deterioration Index Model 22% Age 89 years old     18% Eddie coma scale 13     14% Neurological exam X     7% Potassium 5.0 mmol/L     6% WBC count abnormal (35.3 K/uL)     3% Hematocrit abnormal (29.8 %)     3% Systolic 103     2% Pulse oximetry 100 %     1% BUN abnormal (37 mg/dL)     1% Sodium 138 mmol/L     1% Pulse 89     0% Temperature 98.2 °F (36.8 °C)        Vital Signs:  Vitals:    07/17/24 1230 07/17/24 1335 07/17/24 1345 07/17/24 1415   BP: 102/64 104/64 (!) 103/55    Pulse: 90 89 89    Resp: (!) 1 13 10    Temp:       TempSrc:       SpO2: 100% 100% 100%    Weight:    58.2 kg (128 lb 4.8 oz)   Height:    1.524 m (5')        Provider Notified: Reviewed patient status  & DIS score with Provider Dr Saab    ASSESSMENT:  Just transferred from ICU/ED    Plan of Care: continue to monitor VS Q4 hours    Electronically signed by Catalina Cuenca RN   
   Pharmacy Renal Adjustment    Celsa Henderson is a 89 y.o. female. Pharmacy has renally adjusted medications per protocol.    Recent Labs     07/16/24  1715   BUN 38*       Recent Labs     07/16/24  1715   CREATININE 1.6*       Estimated Creatinine Clearance: 15 mL/min (A) (based on SCr of 1.6 mg/dL (H)).    Height:   Ht Readings from Last 1 Encounters:   07/02/24 1.524 m (5')     Weight:  Wt Readings from Last 1 Encounters:   07/16/24 54.4 kg (120 lb)       Plan: Adjust the following medications based on renal function:           Cefepime to 1000 mg IV every 12 hours extended infusion over 240 minutes      Electronically signed by Gloria Meek RPH on 7/17/2024 at 5:26 AM      
  Physician Progress Note      PATIENT:               DAKOTA WILLIAMSON  CSN #:                  [id number]  :                       1930  ADMIT DATE:       2024 5:06 PM  DISCH DATE:  RESPONDING  PROVIDER #:        Sammi Walters MD          QUERY TEXT:    Patient admitted with Septic shock. Noted to have documentation of  dietician   assessment with  mild malnutrition diagnosis in note . If possible, please document in   progress notes and discharge summary if you are evaluating and /or treating   any of the following:    The medical record reflects the following:  Risk Factors: Sepsis, CHF  Clinical Indicators: Decreased energy and fluid accumulation. Mild   Malnutrition by dietary.  Treatment:  IVF 75 ml/hr, Dietary consult and treatment.      Thank you  Shania Sarah RN, BSN, [facility]  [phone]  ASPEN Criteria:    https://aspenjournals.onlinelibrary.edmondson.com/doi/full/10.1177/267680525575309  5  Options provided:  -- Protein calorie malnutrition mild  -- Other - I will add my own diagnosis  -- Disagree - Not applicable / Not valid  -- Disagree - Clinically unable to determine / Unknown  -- Refer to Clinical Documentation Reviewer    PROVIDER RESPONSE TEXT:    This patient has mild protein calorie malnutrition.    Query created by: Shania Sarah on 2024 7:34 AM      Electronically signed by:  Sammi Walters MD 2024 12:23 PM          
4 Eyes Skin Assessment     NAME:  Celsa Henderson  YOB: 1930  MEDICAL RECORD NUMBER:  [id number]    The patient is being assessed for  Admission    I agree that at least one RN has performed a thorough Head to Toe Skin Assessment on the patient. ALL assessment sites listed below have been assessed.      Areas assessed by both nurses:    Head, Face, Ears, Shoulders, Back, Chest, Arms, Elbows, Hands, Sacrum. Buttock, Coccyx, Ischium, Legs. Feet and Heels, and Under Medical Devices         Does the Patient have a Wound? Yes wound(s) were present on assessment. LDA wound assessment was Initiated and completed by RN       Acrroll Prevention initiated by RN: Yes  Wound Care Orders initiated by RN: Yes    Pressure Injury (Stage 3,4, Unstageable, DTI, NWPT, and Complex wounds) if present, place Wound referral order by RN under : No, patient has previous orders from recent discharge for wound care.    New Ostomies, if present place, Ostomy referral order under : No     Nurse 1 eSignature: Electronically signed by Catalina Cuenca RN on 7/17/24 at 3:24 PM CDT    **SHARE this note so that the co-signing nurse can place an eSignature**    Nurse 2 eSignature: Electronically signed by Silvia Geller RN on 7/17/24 at 4:00 PM CDT   
Celsa Henderson arrived to room # 314.   Presented with: AMS  Mental Status: Patient is oriented, alert, coherent, logical, thought processes intact, and able to concentrate and follow conversation.   Vitals:    07/17/24 1345   BP: (!) 103/55   Pulse: 89   Resp: 10   Temp:    SpO2: 100%     Patient safety contract and falls prevention contract reviewed with patient Yes.  Oriented Patient and Family to room.  Call light within reach. Yes.  Needs, issues or concerns expressed at this time: no.      Electronically signed by Catalina Cuenca RN on 7/17/2024 at 2:25 PM   
Comprehensive Nutrition Assessment    Type and Reason for Visit:  Initial, Positive Nutrition Screen    Nutrition Recommendations/Plan:   Start ONS, follow for po intake      Malnutrition Assessment:  Malnutrition Status:  Mild malnutrition (07/18/24 1133)    Context:  Acute Illness     Findings of the 6 clinical characteristics of malnutrition:  Energy Intake:  Mild decrease in energy intake (Comment)  Weight Loss:  No significant weight loss     Body Fat Loss:  No significant body fat loss     Muscle Mass Loss:  No significant muscle mass loss    Fluid Accumulation:  Moderate to Severe Extremities   Strength:  Not Performed    Nutrition Assessment:    +NS for decreased po intake and wound.  Pt very sleepy at breakfast.  Only took few bites, but drank most of juice.  Will try Ensure Clear as ONS for added calories and protein    weight has increased about 6# in the past month, but now aware of edema present.    Nutrition Related Findings:    nonpitting RUE edema., + pitting LUE and RLE edema., nonpitting LLE edema Wound Type: Stage III       Current Nutrition Intake & Therapies:    Average Meal Intake: 1-25%  Average Supplements Intake: None Ordered  ADULT DIET; Regular  ADULT ORAL NUTRITION SUPPLEMENT; Breakfast, Dinner; Clear Liquid Oral Supplement    Anthropometric Measures:  Height: 152.4 cm (5')  Ideal Body Weight (IBW): 100 lbs (45 kg)    Admission Body Weight: 58.2 kg (128 lb 4.9 oz)  Current Body Weight: 58.2 kg (128 lb 4.9 oz),   IBW. Weight Source: Bed Scale  Current BMI (kg/m2): 25.1  Usual Body Weight: 55.3 kg (121 lb 14.6 oz) (6/24/2024)  % Weight Change (Calculated): 5.2  BMI Categories: Overweight (BMI 25.0-29.9)    Estimated Daily Nutrient Needs:  Energy Requirements Based On: Kcal/kg  Weight Used for Energy Requirements: Current  Energy (kcal/day): 2068-8080 kcals (20-25 kcals/kg)  Weight Used for Protein Requirements: Ideal  Protein (g/day): 59-91g  Method Used for Fluid Requirements: 1 
Earlier Progress Note    Date:2024       Room:0314/314-02  Patient Name:Celsa Henderson     YOB: 1930     Age:89 y.o.      Subjective    Subjective: Assumed Care     89 year old lady with history of CAD, chronic systolic and diastolic HF, HLD, PAD, who presented to the hospital 24 with concerns of AMS and hypotension. Was admitted to the ICU, for SIRS, placed on broad spectrum antibiotics and initiated on levophed. CT head- No acute intracranial process. CT abd/pelvis-No acute intra-abdominal or pelvic process. Right hip/pelvis imaging-No acute findings status post ORIF of the right femur. Cxr-No focal infiltrate, effusion, or pneumothorax is identified. Lactic acid 2.2. Patient subsequently transitioned to medicine metzger. No source of infection noted, vancomycin discontinued and currently on Cefepime.      Seen today in the morning with no family member present, and later in the afternoon with nice present. no acute overnight events noted in chart. Patient tends to do better with niece present. Discussed with niece that no source of infection fond however WBC was high on admission and patient hypotensive hence she is still on 1 antibiotics. Niece wants to see how she progresses at the SNF and if no significant improvement then will discuss further goals of care.     Review of Systems: unable to fully obtain    Objective         Vitals Last 24 Hours:  TEMPERATURE:  Temp  Av.1 °F (36.7 °C)  Min: 97.2 °F (36.2 °C)  Max: 98.6 °F (37 °C)  RESPIRATIONS RANGE: Resp  Av  Min: 16  Max: 24  PULSE OXIMETRY RANGE: SpO2  Av.4 %  Min: 95 %  Max: 98 %  PULSE RANGE: Pulse  Av.8  Min: 88  Max: 99  BLOOD PRESSURE RANGE: Systolic (24hrs), Av , Min:112 , Max:118   ; Diastolic (24hrs), Av, Min:61, Max:89    I/O (24Hr):    Intake/Output Summary (Last 24 hours) at 2024 1120  Last data filed at 2024 0513  Gross per 24 hour   Intake 757.01 ml   Output 600 ml   Net 157.01 ml 
Facility/Department: [facility]/VAS/MED   CLINICAL BEDSIDE SWALLOW EVALUATION  SPEECH PRODUCTION EVALUATION     NAME: Celsa Henderson  : 1930  MRN: [id number]    ADMISSION DATE: 2024  ADMITTING DIAGNOSIS: has Ischemic dilated cardiomyopathy (HCC); Status post coronary artery stent placement; Mixed hyperlipidemia; CAP (community acquired pneumonia); Cholelithiasis; Diverticulosis; Hyperlipemia; Chronic combined systolic and diastolic heart failure (HCC); Acute respiratory failure with hypoxia (HCC); AAA (abdominal aortic aneurysm) (HCC); Descending thoracic aortic aneurysm (HCC); Pneumonia of right lower lobe due to infectious organism; Calculus of gallbladder without cholecystitis without obstruction; Coronary artery disease involving native heart without angina pectoris; Diverticulosis of intestine without bleeding; Hyperlipidemia; Decubitus ulcer of left heel, stage 3 (HCC); Unstageable pressure ulcer of right heel (HCC); PAD (peripheral artery disease) (HCC); Medically noncompliant; Systolic and diastolic CHF, acute on chronic (HCC); Palliative care patient; Intertrochanteric fracture of right femur, closed, initial encounter ([facility]); Elevated troponin; Wound of right leg; Closed displaced intertrochanteric fracture of right femur (HCC); Hypotension; ELVIRA (acute kidney injury) ([facility]); Septic shock (HCC); and Mild malnutrition ([facility]) on their problem list.    Date of Eval: 2024  Evaluating Therapist: BRI Tellez    Pain:  Pain Assessment  Pain Assessment: 0-10  Pain Level: 2  Stubbs-Baker Pain Rating: No hurt  Patient's Stated Pain Goal: 0 - No pain  Pain Location: Abdomen  Pain Orientation: Lower  Pain Descriptors: Cramping  Functional Pain Assessment: Prevents or interferes some active activities and ADLs  Non-Pharmaceutical Pain Intervention(s): Repositioned, Rest  Response to Pain Intervention: Pain improved but above pain goal  Side Effects: No reported side effects  Faces, Legs, Activity, Cry, and 
[facility]   Pharmacy Pharmacokinetic Monitoring Service - Vancomycin     Celsa Henderson is a 89 y.o. female starting on vancomycin therapy for sepsis. Pharmacy consulted by IZA Kiser for monitoring and adjustment.    Target Concentration: Goal trough of 15-20 mg/L switch to -600 once renal function stable    Additional Antimicrobials: cefepime    Pertinent Laboratory Values:   Wt Readings from Last 1 Encounters:   07/16/24 54.4 kg (120 lb)     Temp Readings from Last 1 Encounters:   07/16/24 98.8 °F (37.1 °C) (Axillary)     Estimated Creatinine Clearance: 15 mL/min (A) (based on SCr of 1.6 mg/dL (H)).  Recent Labs     07/16/24  1715   CREATININE 1.6*   BUN 38*   WBC 27.7*     Procalcitonin: 0.81    Pertinent Cultures:  Culture Date Source Results   7/16/24 Blood x2 Sent    7/16/24 Urine  Sent    MRSA Nasal Swab: N/A. Non-respiratory infection.    Plan:  Concentration-guided dosing due to renal impairment/insufficiency  Start vancomycin pulse dosing. Vancomycin 1250mg IV load given in ED  Renal labs as indicated   Vancomycin concentration ordered for 7/17 @ 1800 (~ 24 hours after dose)   Pharmacy will continue to monitor patient and adjust therapy as indicated    Thank you for the consult,  Gloria Meek RPH  7/16/2024 9:20 PM   
[facility]   Pharmacy Pharmacokinetic Monitoring Service - Vancomycin    Consulting Provider: Antoni Dodson   Indication: Sepsis  Target Concentration: Goal AUC/WILFRED 400-600 mg*hr/L  Day of Therapy: 4  Additional Antimicrobials: Maxipime    Pertinent Laboratory Values:   Wt Readings from Last 1 Encounters:   07/17/24 58.2 kg (128 lb 4.8 oz)     Temp Readings from Last 1 Encounters:   07/18/24 98.1 °F (36.7 °C) (Temporal)     Estimated Creatinine Clearance: 25 mL/min (A) (based on SCr of 1.1 mg/dL (H)).  Recent Labs     07/17/24  0523 07/18/24  0356   CREATININE 1.3* 1.1*   BUN 37* 33*   WBC 35.3* 23.1*     Procalcitonin: 07/16= 0.81    Pertinent Cultures:  Culture Date Source Results   07/16/24 Urine  Blood No growth  No growth   MRSA Nasal Swab: N/A. Non-respiratory infection.    Recent vancomycin administrations                     vancomycin (VANCOCIN) 750 mg in sodium chloride 0.9 % 250 mL IVPB ([id number]) (mg) 750 mg New Bag 07/18/24 2311    vancomycin (VANCOCIN) 750 mg in sodium chloride 0.9 % 250 mL IVPB ([id number]) (mg) 750 mg New Bag 07/17/24 2128    vancomycin (VANCOCIN) 1,250 mg in sodium chloride 0.9 % 250 mL IVPB (mg) 1,250 mg New Bag 07/16/24 1813                    Assessment:  Date/Time Current Dose Concentration Timing of Concentration (h) AUC   07/18/24 Pulse dosing 9.9 Random level     Note: Serum concentrations collected for AUC dosing may appear elevated if collected in close proximity to the dose administered, this is not necessarily an indication of toxicity    Plan:  Current dosing regimen is therapeutic  Start Vancomycin 750mg Q24hr  (AUC= 567  Tr= 17.6)  Repeat vancomycin concentration ordered for 07/20 @ 2100   Pharmacy will continue to monitor patient and adjust therapy as indicated    Thank you for the consult,  Teddy Koehler [facility]  7/19/2024 12:34 AM   
[facility]   Pharmacy Pharmacokinetic Monitoring Service - Vancomycin    Consulting Provider: IZA Kiser   Indication: sepsis  Target Concentration: Goal trough of 15-20 mg/L switch to -600 once renal function stable  Day of Therapy: 2  Additional Antimicrobials: cefepime    Pertinent Laboratory Values:   Wt Readings from Last 1 Encounters:   07/17/24 58.2 kg (128 lb 4.8 oz)     Temp Readings from Last 1 Encounters:   07/17/24 99.1 °F (37.3 °C) (Temporal)     Estimated Creatinine Clearance: 21 mL/min (A) (based on SCr of 1.3 mg/dL (H)).  Recent Labs     07/16/24  1715 07/17/24  0523   CREATININE 1.6* 1.3*   BUN 38* 37*   WBC 27.7* 35.3*     Procalcitonin: No new level    Pertinent Cultures:  Culture Date Source Results   07/16/24 Blood No growth to date   07/16/24 Urine No growth   MRSA Nasal Swab: N/A. Non-respiratory infection.    Recent vancomycin administrations                     vancomycin (VANCOCIN) 1,250 mg in sodium chloride 0.9 % 250 mL IVPB (mg) 1,250 mg New Bag 07/16/24 1813                    Assessment:  Date/Time Current Dose Concentration Timing of Concentration (h)   07/17/24 @ 1832 Pulse dosing 10.5 ~ 24 h   Note: Serum concentrations collected for AUC dosing may appear elevated if collected in close proximity to the dose administered, this is not necessarily an indication of toxicity    Plan:  Give Vancomycin 750mg IV x 1 dose  Repeat vancomycin concentration ordered for 07/18/24 @ 2030   Pharmacy will continue to monitor patient and adjust therapy as indicated    Thank you for the consult,  DILLON CUTLER, PHARM D, 7/17/2024, 7:36 PM    
Hospitalist Progress Note    Patient:  Celsa Henderson  YOB: 1930  Date of Service: 7/17/2024  MRN: [id number]   Acct: [id number]   Primary Care Physician: Rigo Persaud MD  Advance Directive: DNR  Admit Date: 7/16/2024       Hospital Day: 1  Referring Provider: Luciano Arreaga DO    Patient Seen, Chart, Consults, Notes, Labs, Radiology studies reviewed.    Subjective:  Celsa Henderson is a 89 y.o. female  whom we are following for sepsis, ELVIRA, chronic combined systolic/diastolic heart failure.  She was admitted last evening for the above.  She was held overnight in the emergency department due to lack of ICU beds.  She received broad-spectrum antibiotics and IV fluids overnight.  Her GFR is improved with fluid resuscitation.  Blood and urine cultures show no growth thus far.  She is stable for downgrade to [facility].    Allergies:  Codeine, Cortizone, Penicillins, and Sulfa antibiotics    Medicines:  Current Facility-Administered Medications   Medication Dose Route Frequency Provider Last Rate Last Admin    aspirin EC tablet 81 mg  81 mg Oral Daily Antoni Dodson APRN - CNP   81 mg at 07/17/24 0846    docusate sodium (COLACE) capsule 100 mg  100 mg Oral BID Antoni Dodson APRN - CNP   100 mg at 07/17/24 0846    ipratropium 0.5 mg-albuterol 2.5 mg (DUONEB) nebulizer solution 1 Dose  1 Dose Inhalation Q4H PRN Antoni Dodson APRN - CNP        vitamin B-12 (CYANOCOBALAMIN) tablet 1,000 mcg  1,000 mcg Oral Daily Antoni Dodson APRN - CNP   1,000 mcg at 07/17/24 0846    cefepime (MAXIPIME) 1,000 mg in sodium chloride 0.9 % 50 mL IVPB ([id number])  1,000 mg IntraVENous Q12H Colten Brock MD   Stopped at 07/17/24 0949    [START ON 7/18/2024] xeroform petrolatum gauze 5\"X9\" external pads 1 each  1 each Topical Daily Luciano Arreaga DO        sodium chloride flush 0.9 % injection 5-40 mL  5-40 mL IntraVENous 2 times per day Silvia Mar MD   10 mL at 07/16/24 5487    sodium chloride flush 0.9 % 
Hospitalist Progress Note    Patient:  Celsa Henderson  YOB: 1930  Date of Service: 7/18/2024  MRN: [id number]   Acct: [id number]   Primary Care Physician: Rigo Persaud MD  Advance Directive: DNR  Admit Date: 7/16/2024       Hospital Day: 2  Referring Provider: Luciano Arreaga DO    Patient Seen, Chart, Consults, Notes, Labs, Radiology studies reviewed.    Subjective:  Celsa Henderson is a 89 y.o. female  whom we are following for sepsis, ELVIRA, chronic combined systolic/diastolic heart failure.  She is doing better today.  GFR continues to improve.  Cultures are still negative thus far.    Allergies:  Codeine, Cortizone, Penicillins, and Sulfa antibiotics    Medicines:  Current Facility-Administered Medications   Medication Dose Route Frequency Provider Last Rate Last Admin    aspirin EC tablet 81 mg  81 mg Oral Daily Antoni Dodson APRN - CNP   81 mg at 07/18/24 0923    docusate sodium (COLACE) capsule 100 mg  100 mg Oral BID Antoni Dodson APRN - CNP   100 mg at 07/18/24 0923    ipratropium 0.5 mg-albuterol 2.5 mg (DUONEB) nebulizer solution 1 Dose  1 Dose Inhalation Q4H PRN Antoni Dodson APRN - CNP        vitamin B-12 (CYANOCOBALAMIN) tablet 1,000 mcg  1,000 mcg Oral Daily Antoni Dodson APRN - CNP   1,000 mcg at 07/18/24 0923    cefepime (MAXIPIME) 1,000 mg in sodium chloride 0.9 % 50 mL IVPB ([id number])  1,000 mg IntraVENous Q12H Colten Brock MD   Stopped at 07/18/24 0851    xeroform petrolatum gauze 5\"X9\" external pads 1 each  1 each Topical Daily Luciano Arreaga DO   1 each at 07/18/24 0923    epoetin love-epbx (RETACRIT) injection 10,000 Units  10,000 Units SubCUTAneous Weekly Luciano Arreaga DO   10,000 Units at 07/17/24 1707    ferric gluconate (FERRLECIT) 250 mg in sodium chloride 0.9 % 250 mL IVPB  250 mg IntraVENous Q24H Luciano Arreaga DO   Stopped at 07/17/24 1910    lactulose (CHRONULAC) 10 GM/15ML solution 20 g  20 g Oral TID PRN Luciano Arreaga DO   20 g 
Occupational Therapy  Facility/Department: [facility]/VAS/MED  Occupational Therapy Initial Assessment    Name: Celsa Henderson  : 1930  MRN: [id number]  Date of Service: 2024    Discharge Recommendations:             Patient Diagnosis(es): The primary encounter diagnosis was Altered mental status, unspecified altered mental status type. Diagnoses of Septic shock (HCC), Hypotension, unspecified hypotension type, and Leukocytosis, unspecified type were also pertinent to this visit.  Past Medical History:  has a past medical history of CAD (coronary artery disease), Cardiomyopathy (HCC), CHF (congestive heart failure) (HCC), Hyperlipemia, Palliative care patient, Peripheral artery disease (HCC), and Status post coronary artery stent placement.  Past Surgical History:  has a past surgical history that includes Cardiac catheterization (2013   [facility]); back surgery; Appendectomy; Tonsillectomy; and hip surgery (Right, 2024).    Treatment Diagnosis: Hypotension, septic shock      Assessment   Performance deficits / Impairments: Decreased functional mobility ;Decreased strength;Decreased endurance;Decreased ADL status;Decreased safe awareness;Decreased ROM;Decreased cognition;Decreased balance  Assessment: Will progress as tolerated  Treatment Diagnosis: Hypotension, septic shock  Prognosis: Fair  Decision Making: Low Complexity  REQUIRES OT FOLLOW-UP: Yes  Activity Tolerance  Activity Tolerance: Treatment limited secondary to medical complications (free text)        Plan   Occupational Therapy Plan  Times Per Week: 3-5x/week  Times Per Day: Once a day     Restrictions  Restrictions/Precautions  Restrictions/Precautions: Weight Bearing, Fall Risk  Required Braces or Orthoses?: No    Subjective   General  Chart Reviewed: Yes  Patient assessed for rehabilitation services?: Yes  Family / Caregiver Present: No  Diagnosis: Hypotension, septic shock  did not rate but yelled out with turning.  Social/Functional 
Palliative Care/Spiritual Care: Met with pt to initiate palliative care. Pt says she has a broken hip and provider notes agree with this. She had a fall on 6/24/2024 and had surgery on her hip on 6/25/24. Pt discharged to a SNF. ED note says AMS, septic shock, hypotension, and leukocytosis. Pt says she is having pain in her hip and is receiving pain medication. Pt has other diagnoses in past medical history, and she is known to palliative care.         Advance Directives: Pt has a LW and her primary decision maker is Antoni Barajas. Her secondary decision maker is Michelle Leon. Pt is a DNR and she does not want CPR or Ventilator. SEE ACP NOTE.        Pain/other symptoms: Pt is having pain in her hip where she had surgery on 6/25/2024. She says she is receiving pain medication.         Social/Spiritual: Pt says she is Spiritism.         Pt/family discussion r/t goals: Pt says she lives at home and she did prior to 6/24/2024. She was previously discharged to a SNF for rehab after  hip surgery. Pt has a bedhold at the SNF and will discharge back to the SNF when medically able.       Provided spiritual care with sustaining presence, nurtured hope, assisted with a phone call, and prayer. Pt expressed gratitude for spiritual care.     Electronically signed by Mary Behrens on 7/18/2024 at 10:28 AM            
Patient RR 26 and appears to be using accessory muscles to breath. O2 sat about 95%. Deterioration index 64. Patient placed on 2L O2 via NC to help decrease respiratory effort.     Electronically signed by Trixie Larson RN on 7/21/2024 at 12:58 AM    
Patient missed by two lab techs for labs this AM.   
Physical Therapy  Celsa Henderson  [id number]       07/20/24 1557   Restrictions/Precautions   Restrictions/Precautions Weight Bearing;Fall Risk   Required Braces or Orthoses? No   General   Chart Reviewed Yes   Additional Pertinent Hx recent hospital stay, CM nail R hip 6/25/24   Response To Previous Treatment Not applicable   Family / Caregiver Present No   Subjective   Subjective Pt notes her buttocks and legs hurt.   General Comment   Comments RNShanon PT treatment.   Pain Assessment   Pain Assessment Stubbs-Martínez FACES   Pain Level 6   Pain Location Buttocks;Hip;Leg   Pain Descriptors Aching   Functional Pain Assessment Prevents or interferes with all active and some passive activities   Non-Pharmaceutical Pain Intervention(s) Ambulation/Increased Activity;Repositioned;Rest   Response to Pain Intervention Patient satisfied   Side Effects No reported side effects   Bed Mobility   Rolling Moderate assistance   Transfers   Sit to Stand Unable to assess   Ambulation   WB Status WBAT RLE   Exercises   Heelslides 10 AAROM   Hip Abduction 10   Ankle Pumps 15   Comments BLE A/AAROM   Patient Goals    Patient Goals  feel better   Short Term Goals   Time Frame for Short Term Goals 2 wks   Short Term Goal 1 supine to sit CGA   Short Term Goal 2 sit to stand Min A in SS   Short Term Goal 3 bed to chair SS, WBAT RLE   Conditions Requiring Skilled Therapeutic Intervention   Body Structures, Functions, Activity Limitations Requiring Skilled Therapeutic Intervention Decreased functional mobility ;Decreased strength;Decreased ROM;Decreased cognition;Decreased endurance;Decreased coordination;Increased pain;Decreased posture   Assessment Pt unable to come to EOB with 1 person assist due to c/o pain and fatigue. Pt would initiate movement and then stop. Pt able to get to midline in bed with min x 1 and scooted pt up in bed assisted by PCA with dep x 2. Pt confused this date. Nurse is aware. Pt would benefit from continued 
Physical Therapy  Facility/Department: [facility] 3 KAT[facility]/VAS/MED  Physical Therapy Initial Assessment    Name: Celsa Henderson  : 1930  MRN: [id number]  Date of Service: 2024    Discharge Recommendations:  Continue to assess pending progress, 24 hour supervision or assist, Patient would benefit from continued therapy after discharge          Patient Diagnosis(es): The primary encounter diagnosis was Altered mental status, unspecified altered mental status type. Diagnoses of Septic shock (HCC), Hypotension, unspecified hypotension type, and Leukocytosis, unspecified type were also pertinent to this visit.  Past Medical History:  has a past medical history of CAD (coronary artery disease), Cardiomyopathy (HCC), CHF (congestive heart failure) (HCC), Hyperlipemia, Palliative care patient, Peripheral artery disease (HCC), and Status post coronary artery stent placement.  Past Surgical History:  has a past surgical history that includes Cardiac catheterization (2013   [facility]); back surgery; Appendectomy; Tonsillectomy; and hip surgery (Right, 2024).    Assessment   Body Structures, Functions, Activity Limitations Requiring Skilled Therapeutic Intervention: Decreased functional mobility ;Decreased ADL status;Decreased ROM;Decreased safe awareness;Decreased strength;Decreased balance;Decreased endurance;Increased pain;Decreased posture  Assessment: Pt. will benefit from cont. PT to decrease impairments. Pt. a fall risk and should not attempt mobility on her own at this time. Pt. needs 24 hr care. Anticipate return to SNF. Pt. likely would be a SS to chair. States she has 2A at SNF.  Treatment Diagnosis: deconditioning  Therapy Prognosis: Fair  Decision Making: Medium Complexity  Requires PT Follow-Up: Yes  Activity Tolerance  Activity Tolerance: Patient limited by fatigue;Patient limited by pain     Plan   Physical Therapy Plan  General Plan: 5-7 times per week  Therapy Duration: 2 Weeks  Current Treatment 
Physical Therapy  Name: Celsa Henderson  MRN:  [id number]  Date of service:  7/19/2024 07/19/24 1000   Restrictions/Precautions   Restrictions/Precautions Weight Bearing;Fall Risk   General   Additional Pertinent Hx recent hospital stay, CM nail R hip 6/25/24   Family / Caregiver Present No   Referring Practitioner Luciano Arreaga DO   Subjective   Subjective I am just so sick.   Oxygen Therapy   O2 Device None (Room air)   Bed Mobility   Rolling Minimal assistance   Scooting Moderate assistance   Exercises   Quad Sets 10   Heelslides 10 AAROM   Ankle Pumps 10   Other Activities   Comment bed mobility performed to reposition in bed.   Short Term Goals   Time Frame for Short Term Goals 2 wks   Short Term Goal 1 supine to sit CGA   Short Term Goal 2 sit to stand Min A in SS   Short Term Goal 3 bed to chair SS, WBAT RLE   Conditions Requiring Skilled Therapeutic Intervention   Body Structures, Functions, Activity Limitations Requiring Skilled Therapeutic Intervention Decreased functional mobility ;Decreased ADL status;Decreased ROM;Decreased safe awareness;Decreased strength;Decreased balance;Decreased endurance;Increased pain;Decreased posture   Treatment Diagnosis deconditioning   Discharge Recommendations Continue to assess pending progress;24 hour supervision or assist;Patient would benefit from continued therapy after discharge   Activity Tolerance   Activity Tolerance Patient limited by fatigue;Treatment limited secondary to medical complications;Patient limited by endurance   Physical Therapy Plan   General Plan 5-7 times per week   Therapy Duration 2 Weeks   Current Treatment Recommendations Strengthening;ROM;Balance training;Functional mobility training;Transfer training;Gait training;Endurance training;Safety education & training;Positioning;Equipment evaluation, education, & procurement;Patient/Caregiver education & training;Therapeutic activities   PT Plan of Care   Friday X   Safety Devices   Type of 
Recent Labs     07/18/24  0356 07/19/24  0358   BUN 33* 27*       Recent Labs     07/18/24  0356 07/19/24  0358   CREATININE 1.1* 0.9       Estimated Creatinine Clearance: 31 mL/min (based on SCr of 0.9 mg/dL).      Plan: Change Cefepime to 2000 mg IV over 240 min every 12 hours for CRCL 30-59 ml/min for     Sepsis of Unknown Etiology         Electronically signed by Andrew Fisher RPH on 7/19/2024 at 12:00 PM    
Report called to TITO Mckeon on 3rd floor.   
Spoke with Disha, nurse at [facility] regarding wound care. NH orders as follows:  Coccyx - cleanse wound with wound cleanser, apply medihoney, apply calcium alginate and cover with foam border gauze.   RLE hematoma - cleanse with wound cleanser, apply xeroform gauze and cover with border gauze.   Bilateral heels - apply skin prep.      Gibson SEBASTIAN notified. Wound care orders placed.   
normal BP    HLD: Statin    PAD: Statin and ASA    Mild protein Calorie Malnutrition      Dispo: Pending overall clinical status    Electronically signed by Sammi Gomes MD on 7/19/24 at 11:25 AM CDT    
overload    ELVIRA: resolved  Avoid nephrotoxic agents    Chronic Systolic and diastolic HF  ECHO from 06/24 reviewed with EF of 30-35%  Stop IV fluids  1x dose of bumex ordered 7/19 due to volume overload, with good outcome    CAD s/p stents  ASA, coreg  Hold Lisinopril for now due to normal BP    HLD: Statin    PAD: Statin and ASA    Mild protein Calorie Malnutrition      Dispo: likely to SNF tomorrow; Pending downtrending wbc counts        Electronically signed by   Sammi Gomes MD   Internal Medicine Hospitalist  On 7/21/2024  At 11:34 AM    EMR Dragon/Transcription disclaimer:   Much of this encounter note is an electronic transcription/translation of spoken language to printed text. The electronic translation of spoken language may permit erroneous, or at times, nonsensical words or phrases to be inadvertently transcribed; although attempts have made to review the note for such errors, some may still exist.

## 2024-07-22 NOTE — PLAN OF CARE
Problem: Skin/Tissue Integrity  Goal: Absence of new skin breakdown  Description: 1.  Monitor for areas of redness and/or skin breakdown  2.  Assess vascular access sites hourly  3.  Every 4-6 hours minimum:  Change oxygen saturation probe site  4.  Every 4-6 hours:  If on nasal continuous positive airway pressure, respiratory therapy assess nares and determine need for appliance change or resting period.  Outcome: Progressing     Problem: ABCDS Injury Assessment  Goal: Absence of physical injury  Outcome: Progressing     Problem: Safety - Adult  Goal: Free from fall injury  Outcome: Progressing     Problem: Discharge Planning  Goal: Discharge to home or other facility with appropriate resources  Outcome: Progressing  Flowsheets (Taken 7/22/2024 0004)  Discharge to home or other facility with appropriate resources:   Identify barriers to discharge with patient and caregiver   Arrange for needed discharge resources and transportation as appropriate   Identify discharge learning needs (meds, wound care, etc)   Arrange for interpreters to assist at discharge as needed   Refer to discharge planning if patient needs post-hospital services based on physician order or complex needs related to functional status, cognitive ability or social support system     Problem: Pain  Goal: Verbalizes/displays adequate comfort level or baseline comfort level  Outcome: Progressing     Problem: Chronic Conditions and Co-morbidities  Goal: Patient's chronic conditions and co-morbidity symptoms are monitored and maintained or improved  Outcome: Progressing  Flowsheets (Taken 7/22/2024 0004)  Care Plan - Patient's Chronic Conditions and Co-Morbidity Symptoms are Monitored and Maintained or Improved:   Monitor and assess patient's chronic conditions and comorbid symptoms for stability, deterioration, or improvement   Collaborate with multidisciplinary team to address chronic and comorbid conditions and prevent exacerbation or

## 2024-07-22 NOTE — PLAN OF CARE
Problem: Skin/Tissue Integrity  Goal: Absence of new skin breakdown  Description: 1.  Monitor for areas of redness and/or skin breakdown  2.  Assess vascular access sites hourly  3.  Every 4-6 hours minimum:  Change oxygen saturation probe site  4.  Every 4-6 hours:  If on nasal continuous positive airway pressure, respiratory therapy assess nares and determine need for appliance change or resting period.  7/22/2024 1137 by Ely Morales RN  Outcome: Progressing  7/22/2024 0022 by Jade Caban RN  Outcome: Progressing     Problem: ABCDS Injury Assessment  Goal: Absence of physical injury  7/22/2024 1137 by Ely Morales RN  Outcome: Progressing  7/22/2024 0022 by Jade Caban RN  Outcome: Progressing     Problem: Safety - Adult  Goal: Free from fall injury  7/22/2024 1137 by Ely Morales RN  Outcome: Progressing  7/22/2024 0022 by Jade Caban RN  Outcome: Progressing     Problem: Discharge Planning  Goal: Discharge to home or other facility with appropriate resources  7/22/2024 1137 by Ely Morales RN  Outcome: Progressing  7/22/2024 0022 by Jade Caban RN  Outcome: Progressing  Flowsheets (Taken 7/22/2024 0004)  Discharge to home or other facility with appropriate resources:   Identify barriers to discharge with patient and caregiver   Arrange for needed discharge resources and transportation as appropriate   Identify discharge learning needs (meds, wound care, etc)   Arrange for interpreters to assist at discharge as needed   Refer to discharge planning if patient needs post-hospital services based on physician order or complex needs related to functional status, cognitive ability or social support system     Problem: Pain  Goal: Verbalizes/displays adequate comfort level or baseline comfort level  7/22/2024 1137 by Ely Morales RN  Outcome: Progressing  7/22/2024 0022 by Jade Caban RN  Outcome: Progressing     Problem: Chronic Conditions and

## 2024-07-22 NOTE — CONSULTS
Consult received. Patient known to Palliative care and is being followed this admission by Palliative RN/ for supportive visit and ongoing goals of care discussions. We will follow along and assist as needed and PC RN/ will follow up today or tomorrow. Thank you for allowing us to participate in the care of this patient.    Pam Vergara, IZA-CNP

## 2024-07-22 NOTE — DISCHARGE SUMMARY
Discharge Summary    Date:7/22/2024        Patient Name:Celsa Henderson     YOB: 1930     Age:89 y.o.    Admit Date:7/16/2024   Admission Condition:fair   Discharged Condition:fair  Discharge Date: 07/22/24       Discharge Diagnoses   Principal Problem:    Septic shock (HCC)  Active Problems:    Chronic combined systolic and diastolic heart failure (HCC)    Hypotension    ELVIRA (acute kidney injury) (HCC)    Mild malnutrition (HCC)  Resolved Problems:    * No resolved hospital problems. *      Hospital Stay   Narrative of Hospital Course:     89 year old lady with history of CAD, chronic systolic and diastolic HF, HLD, PAD, who presented to the hospital 7/16/24 with concerns of AMS and hypotension. Was admitted to the ICU, for SIRS, placed on broad spectrum antibiotics and initiated on levophed. CT head- No acute intracranial process. CT abd/pelvis-No acute intra-abdominal or pelvic process. Right hip/pelvis imaging-No acute findings status post ORIF of the right femur. Cxr-No focal infiltrate, effusion, or pneumothorax is identified. Lactic acid 2.2. Patient subsequently transitioned to medicine metzger. No source of infection noted, vancomycin discontinued and currently on Cefepime in house.       Patient tends to do better with niece present. Discussed with niece that no source of infection found however WBC was high on admission and patient hypotensive hence she was continued on 1 antibiotics. Niserena wants to see how she progresses at the SNF and if no significant improvement then will discuss further goals of care. Per prior SNF facility, patient moans all the time, did not really participate with therapy and they had to stop her skilled services before they [facility] before sent to our ED.       Physical Examination:  General: ill appearing, intermittent moaning, fatigue looking, no acute distress  HEENT: Atraumatic normocephalic,

## 2024-07-22 NOTE — CARE COORDINATION
07/22/24 1609   IMM Letter   IMM Letter given to Patient/Family/Significant other/Guardian/POA/by: FERNANDO placed phone  call to Pt's niece/POA Michelle Leon to explain letter; she voiced understanding; didn't wish Pt to stay 4h to d/c; ALFONSO Hedrick   IMM Letter date given: 07/22/24   IMM Letter time given: 1609     FERNANDO placed letter into soft chart; copy left at bedside with Pt.  Rani Hinson present for conversation  Electronically signed by RENEA Willoughby on 7/22/2024 at 5:56 PM

## 2024-07-24 PROBLEM — R79.89 ELEVATED TROPONIN: Status: RESOLVED | Noted: 2024-06-24 | Resolved: 2024-07-24

## (undated) DEVICE — DRESSING BORDERED ADH GZ UNIV GEN USE 8INX4IN AND 6INX2IN

## (undated) DEVICE — SHEET,DRAPE,53X77,STERILE: Brand: MEDLINE

## (undated) DEVICE — BIT DRL L L266MM DIA16MM FEM FLX CANN QUIK CPL

## (undated) DEVICE — BIT DRL L300MM DIA10MM CANN TAPR L QUIK CPL FOR DH DC TFN

## (undated) DEVICE — SUTURE VICRYL + SZ 0 L27IN ABSRB UD CT-1 L36MM 1/2 CIR TAPR VCP260H

## (undated) DEVICE — BIT DRL L330MM DIA4.2MM CALIB 100MM 3 FLUT QUIK CPL

## (undated) DEVICE — SURGICAL PROCEDURE PACK LOWER EXTREMITY LOURDES HOSP

## (undated) DEVICE — Device

## (undated) DEVICE — COVER POS PERINL POST NS 082501

## (undated) DEVICE — GLOVE SURG SZ 75 CRM LTX FREE POLYISOPRENE POLYMER BEAD ANTI

## (undated) DEVICE — CURAVIEW LED LARYNGOSCOPE BLADE & HANDLE,DISPOSABLE,MILLER 2: Brand: CURAPLEX

## (undated) DEVICE — C-ARM: Brand: UNBRANDED

## (undated) DEVICE — TUBE ET 7.5MM NSL ORAL BASIC CUF INTMED MURPHY EYE RADPQ

## (undated) DEVICE — C-ARMOR C-ARM EQUIPMENT COVERS CLEAR STERILE UNIVERSAL FIT 12 PER CASE: Brand: C-ARMOR

## (undated) DEVICE — DRESSING MEPILEX BORDER FLEX LITE 5X12.5CM

## (undated) DEVICE — BIT DRL L500MM DIA6X9MM CANN STP L QUIK CPL FOR DH DC TFN

## (undated) DEVICE — GLOVE SURG SZ 8 L12IN FNGR THK79MIL GRN LTX FREE

## (undated) DEVICE — 6617 IOBAN II PATIENT ISOLATION DRAPE 5/BX,4BX/CS: Brand: STERI-DRAPE™ IOBAN™ 2

## (undated) DEVICE — SUTURE VICRYL + SZ 2-0 L36IN ABSRB UD L36MM CT-1 1/2 CIR VCP945H

## (undated) DEVICE — GUIDEWIRE ORTH L400MM DIA3.2MM FOR TFN